# Patient Record
Sex: MALE | Race: WHITE | NOT HISPANIC OR LATINO | Employment: FULL TIME | URBAN - METROPOLITAN AREA
[De-identification: names, ages, dates, MRNs, and addresses within clinical notes are randomized per-mention and may not be internally consistent; named-entity substitution may affect disease eponyms.]

---

## 2017-02-02 ENCOUNTER — LAB VISIT (OUTPATIENT)
Dept: LAB | Facility: OTHER | Age: 72
End: 2017-02-02
Attending: INTERNAL MEDICINE
Payer: MEDICARE

## 2017-02-02 ENCOUNTER — OFFICE VISIT (OUTPATIENT)
Dept: INTERNAL MEDICINE | Facility: CLINIC | Age: 72
End: 2017-02-02
Attending: INTERNAL MEDICINE
Payer: MEDICARE

## 2017-02-02 VITALS
HEART RATE: 59 BPM | BODY MASS INDEX: 23.53 KG/M2 | OXYGEN SATURATION: 98 % | DIASTOLIC BLOOD PRESSURE: 70 MMHG | WEIGHT: 146.38 LBS | HEIGHT: 66 IN | SYSTOLIC BLOOD PRESSURE: 148 MMHG

## 2017-02-02 DIAGNOSIS — Z11.59 NEED FOR HEPATITIS C SCREENING TEST: ICD-10-CM

## 2017-02-02 DIAGNOSIS — Z12.5 PROSTATE CANCER SCREENING: ICD-10-CM

## 2017-02-02 DIAGNOSIS — N40.0 BENIGN NON-NODULAR PROSTATIC HYPERPLASIA WITHOUT LOWER URINARY TRACT SYMPTOMS: ICD-10-CM

## 2017-02-02 DIAGNOSIS — Z00.00 ANNUAL PHYSICAL EXAM: Primary | ICD-10-CM

## 2017-02-02 DIAGNOSIS — M10.9 ACUTE GOUT OF FOOT, UNSPECIFIED CAUSE, UNSPECIFIED LATERALITY: ICD-10-CM

## 2017-02-02 DIAGNOSIS — R79.9 ABNORMAL FINDING OF BLOOD CHEMISTRY: ICD-10-CM

## 2017-02-02 DIAGNOSIS — I10 BENIGN ESSENTIAL HTN: ICD-10-CM

## 2017-02-02 DIAGNOSIS — Z00.00 ANNUAL PHYSICAL EXAM: ICD-10-CM

## 2017-02-02 LAB
ALBUMIN SERPL BCP-MCNC: 3.8 G/DL
ALP SERPL-CCNC: 62 U/L
ALT SERPL W/O P-5'-P-CCNC: 20 U/L
ANION GAP SERPL CALC-SCNC: 11 MMOL/L
AST SERPL-CCNC: 35 U/L
BASOPHILS # BLD AUTO: 0.03 K/UL
BASOPHILS NFR BLD: 0.4 %
BILIRUB SERPL-MCNC: 0.6 MG/DL
BUN SERPL-MCNC: 19 MG/DL
CALCIUM SERPL-MCNC: 9.1 MG/DL
CHLORIDE SERPL-SCNC: 102 MMOL/L
CHOLEST/HDLC SERPL: 3.5 {RATIO}
CO2 SERPL-SCNC: 24 MMOL/L
COMPLEXED PSA SERPL-MCNC: 2.5 NG/ML
CREAT SERPL-MCNC: 1.2 MG/DL
DIFFERENTIAL METHOD: ABNORMAL
EOSINOPHIL # BLD AUTO: 0.3 K/UL
EOSINOPHIL NFR BLD: 3.9 %
ERYTHROCYTE [DISTWIDTH] IN BLOOD BY AUTOMATED COUNT: 12.5 %
EST. GFR  (AFRICAN AMERICAN): >60 ML/MIN/1.73 M^2
EST. GFR  (NON AFRICAN AMERICAN): >60 ML/MIN/1.73 M^2
GLUCOSE SERPL-MCNC: 104 MG/DL
HCT VFR BLD AUTO: 37.3 %
HDL/CHOLESTEROL RATIO: 28.6 %
HDLC SERPL-MCNC: 220 MG/DL
HDLC SERPL-MCNC: 63 MG/DL
HGB BLD-MCNC: 12.4 G/DL
LDLC SERPL CALC-MCNC: 119.4 MG/DL
LYMPHOCYTES # BLD AUTO: 3 K/UL
LYMPHOCYTES NFR BLD: 40.5 %
MCH RBC QN AUTO: 33.1 PG
MCHC RBC AUTO-ENTMCNC: 33.2 %
MCV RBC AUTO: 100 FL
MONOCYTES # BLD AUTO: 0.6 K/UL
MONOCYTES NFR BLD: 8 %
NEUTROPHILS # BLD AUTO: 3.5 K/UL
NEUTROPHILS NFR BLD: 47.1 %
NONHDLC SERPL-MCNC: 157 MG/DL
PLATELET # BLD AUTO: 264 K/UL
PMV BLD AUTO: 10.7 FL
POTASSIUM SERPL-SCNC: 4.2 MMOL/L
PROT SERPL-MCNC: 7.3 G/DL
RBC # BLD AUTO: 3.75 M/UL
SODIUM SERPL-SCNC: 137 MMOL/L
TRIGL SERPL-MCNC: 188 MG/DL
WBC # BLD AUTO: 7.4 K/UL

## 2017-02-02 PROCEDURE — 80053 COMPREHEN METABOLIC PANEL: CPT

## 2017-02-02 PROCEDURE — 1126F AMNT PAIN NOTED NONE PRSNT: CPT | Mod: S$GLB,,, | Performed by: INTERNAL MEDICINE

## 2017-02-02 PROCEDURE — 99214 OFFICE O/P EST MOD 30 MIN: CPT | Mod: S$GLB,,, | Performed by: INTERNAL MEDICINE

## 2017-02-02 PROCEDURE — 99999 PR PBB SHADOW E&M-EST. PATIENT-LVL III: CPT | Mod: PBBFAC,,, | Performed by: INTERNAL MEDICINE

## 2017-02-02 PROCEDURE — 1159F MED LIST DOCD IN RCRD: CPT | Mod: S$GLB,,, | Performed by: INTERNAL MEDICINE

## 2017-02-02 PROCEDURE — 1160F RVW MEDS BY RX/DR IN RCRD: CPT | Mod: S$GLB,,, | Performed by: INTERNAL MEDICINE

## 2017-02-02 PROCEDURE — 85025 COMPLETE CBC W/AUTO DIFF WBC: CPT

## 2017-02-02 PROCEDURE — 3078F DIAST BP <80 MM HG: CPT | Mod: S$GLB,,, | Performed by: INTERNAL MEDICINE

## 2017-02-02 PROCEDURE — 3077F SYST BP >= 140 MM HG: CPT | Mod: S$GLB,,, | Performed by: INTERNAL MEDICINE

## 2017-02-02 PROCEDURE — 84153 ASSAY OF PSA TOTAL: CPT

## 2017-02-02 PROCEDURE — 36415 COLL VENOUS BLD VENIPUNCTURE: CPT

## 2017-02-02 PROCEDURE — 83036 HEMOGLOBIN GLYCOSYLATED A1C: CPT

## 2017-02-02 PROCEDURE — 80061 LIPID PANEL: CPT

## 2017-02-02 PROCEDURE — 1157F ADVNC CARE PLAN IN RCRD: CPT | Mod: S$GLB,,, | Performed by: INTERNAL MEDICINE

## 2017-02-02 PROCEDURE — 86803 HEPATITIS C AB TEST: CPT

## 2017-02-02 RX ORDER — ALLOPURINOL 300 MG/1
300 TABLET ORAL DAILY
Qty: 90 TABLET | Refills: 1 | Status: SHIPPED | OUTPATIENT
Start: 2017-02-02 | End: 2017-08-24 | Stop reason: SDUPTHER

## 2017-02-02 RX ORDER — LOSARTAN POTASSIUM 100 MG/1
100 TABLET ORAL DAILY
Qty: 90 TABLET | Refills: 1 | Status: SHIPPED | OUTPATIENT
Start: 2017-02-02 | End: 2017-08-24 | Stop reason: SDUPTHER

## 2017-02-02 RX ORDER — AMLODIPINE BESYLATE 5 MG/1
5 TABLET ORAL DAILY
Qty: 90 TABLET | Refills: 1 | Status: SHIPPED | OUTPATIENT
Start: 2017-02-02 | End: 2017-08-24 | Stop reason: SDUPTHER

## 2017-02-02 NOTE — PATIENT INSTRUCTIONS

## 2017-02-02 NOTE — PROGRESS NOTES
"Subjective:       Patient ID: Rico Park Jr. is a 71 y.o. male.    Chief Complaint: Annual Exam    HPI Comments: Here for annual visit    Radiating low back pain much improved on gabapentin 300mg he takes this most days BID and sometimes TID. He will still have the occasional symptom with walking long distances and requires him to stop and rest for 60 seconds. He is able to get back to run which he is happy about. No significant symptoms with running.    No recent gout issues.    BP remains elevated. Denies CP, SOB, dizziness, blurry vision, or frequent HA.          Review of Systems   Constitutional: Negative for appetite change, chills, fever and unexpected weight change.   HENT: Negative for hearing loss, sore throat and trouble swallowing.    Eyes: Negative for visual disturbance.   Respiratory: Negative for cough, chest tightness and shortness of breath.    Cardiovascular: Negative for chest pain and leg swelling.   Gastrointestinal: Negative for abdominal pain, blood in stool, constipation, diarrhea, nausea and vomiting.   Endocrine: Negative for polydipsia and polyuria.   Genitourinary: Negative for decreased urine volume, difficulty urinating, dysuria, frequency and urgency.   Musculoskeletal: Positive for back pain. Negative for gait problem.   Skin: Negative for rash.   Neurological: Negative for dizziness and numbness.   Psychiatric/Behavioral: The patient is not nervous/anxious.        Objective:      Vitals:    02/02/17 0851   BP: (!) 148/70   Pulse: (!) 59   SpO2: 98%   Weight: 66.4 kg (146 lb 6.2 oz)   Height: 5' 6" (1.676 m)      Physical Exam   Constitutional: He is oriented to person, place, and time. He appears well-developed and well-nourished. No distress.   HENT:   Head: Normocephalic and atraumatic.   Mouth/Throat: Oropharynx is clear and moist. No oropharyngeal exudate.   Eyes: Conjunctivae and EOM are normal. Pupils are equal, round, and reactive to light. No scleral icterus.   Neck: " No thyromegaly present.   Cardiovascular: Normal rate, regular rhythm and normal heart sounds.    No murmur heard.  Pulmonary/Chest: Effort normal and breath sounds normal. He has no wheezes. He has no rales.   Abdominal: Soft. He exhibits no distension. There is no tenderness.   Musculoskeletal: He exhibits no edema or tenderness.   Lymphadenopathy:     He has no cervical adenopathy.   Neurological: He is alert and oriented to person, place, and time.   Skin: Skin is warm and dry.   Psychiatric: He has a normal mood and affect. His behavior is normal.       Assessment:       1. Annual physical exam    2. Need for hepatitis C screening test    3. Acute gout of foot, unspecified cause, unspecified laterality    4. Benign essential HTN    5. Abnormal finding of blood chemistry     6. Prostate cancer screening    7. Benign non-nodular prostatic hyperplasia without lower urinary tract symptoms        Plan:       Rico was seen today for annual exam.    Diagnoses and all orders for this visit:    Need for hepatitis C screening test  -     Hepatitis C antibody; Future    Acute gout of foot, unspecified cause, unspecified laterality  -     allopurinol (ZYLOPRIM) 300 MG tablet; Take 1 tablet (300 mg total) by mouth once daily.    Benign essential HTN  -     losartan (COZAAR) 100 MG tablet; Take 1 tablet (100 mg total) by mouth once daily.  -     amlodipine (NORVASC) 5 MG tablet; Take 1 tablet (5 mg total) by mouth once daily.  -f/u in 3-4 weeks for nurse visit for BP check      Annual physical exam  -     Hemoglobin A1c; Future  -     CBC auto differential; Future  -     Comprehensive metabolic panel; Future  -     Lipid panel; Future  -     PSA, Screening; Future    Abnormal finding of blood chemistry   -     Hemoglobin A1c; Future  -     CBC auto differential; Future  -     Comprehensive metabolic panel; Future  -     Lipid panel; Future  -     PSA, Screening; Future    Prostate cancer screening  -     PSA, Screening;  Future               Side effects of medication(s) were discussed in detail and patient voiced understanding.  Patient will call back for any issues or complications.

## 2017-02-02 NOTE — MR AVS SNAPSHOT
[facility]  [address]  [facility] [address]  Phone: [phone]  Fax: [phone]                  Rico Park Jr.   2017 9:00 AM   Office Visit    Description:  Male : 1945   Provider:  Osmar Stokes MD   Department:  [facility]           Reason for Visit     Annual Exam           Diagnoses this Visit        Comments    Need for hepatitis C screening test    -  Primary     Screening for colorectal cancer         Need for pneumococcal vaccination         Acute gout of foot, unspecified cause, unspecified laterality         Benign essential HTN         Annual physical exam         Abnormal finding of blood chemistry         Prostate cancer screening                To Do List           Future Appointments        Provider Department Dept Phone    2017 9:30 AM LAB, [facility] [phone]      Goals (5 Years of Data)     None       These Medications        Disp Refills Start End    allopurinol (ZYLOPRIM) 300 MG tablet 90 tablet 1 2017     Take 1 tablet (300 mg total) by mouth once daily. - Oral    Pharmacy: [id number] [address] Ph #: [phone]       losartan (COZAAR) 100 MG tablet 90 tablet 1 2017     Take 1 tablet (100 mg total) by mouth once daily. - Oral    Pharmacy: [id number] [address] Ph #: [phone]       amlodipine (NORVASC) 5 MG tablet 90 tablet 1 2017    Take 1 tablet (5 mg total) by mouth once daily. - Oral    Pharmacy: [id number] [address] Ph #: [phone]         [facility] On Call     [facility] On Call Nurse Care Line -  Assistance  Registered nurses in the [facility] On [facility] Center provide clinical advisement, health education, appointment booking, and other advisory services.  Call for this free service at [phone].             Medications     "       Message regarding Medications     Verify the changes and/or additions to your medication regime listed below are the same as discussed with your clinician today.  If any of these changes or additions are incorrect, please notify your healthcare provider.        START taking these NEW medications        Refills    amlodipine (NORVASC) 5 MG tablet 1    Sig: Take 1 tablet (5 mg total) by mouth once daily.    Class: Normal    Route: Oral           Verify that the below list of medications is an accurate representation of the medications you are currently taking.  If none reported, the list may be blank. If incorrect, please contact your healthcare provider. Carry this list with you in case of emergency.           Current Medications     gabapentin (NEURONTIN) 300 MG capsule Take 1 capsule (300 mg total) by mouth 3 (three) times daily.    losartan (COZAAR) 100 MG tablet Take 1 tablet (100 mg total) by mouth once daily.    allopurinol (ZYLOPRIM) 300 MG tablet Take 1 tablet (300 mg total) by mouth once daily.    amlodipine (NORVASC) 5 MG tablet Take 1 tablet (5 mg total) by mouth once daily.    ibuprofen (ADVIL,MOTRIN) 800 MG tablet Take 1 tablet (800 mg total) by mouth every 8 (eight) hours.    tadalafil (CIALIS) 5 MG tablet Take 1 tablet (5 mg total) by mouth daily as needed for Erectile Dysfunction. Daily Use           Clinical Reference Information           Vital Signs - Last Recorded  Most recent update: 2/2/2017  8:53 AM by Amanda Kasper MA    BP Pulse Ht Wt SpO2 BMI    (!) 148/70 (!) 59 5' 6" (1.676 m) 66.4 kg (146 lb 6.2 oz) 98% 23.63 kg/m2      Blood Pressure          Most Recent Value    BP  (!)  148/70      Allergies as of 2/2/2017     Pcn [Penicillins]      Immunizations Administered on Date of Encounter - 2/2/2017     None      Orders Placed During Today's Visit     Future Labs/Procedures Expected by Expires    CBC auto differential  2/2/2017 4/3/2018    Comprehensive metabolic panel  2/2/2017 " 4/3/2018    Hemoglobin A1c  2/2/2017 4/3/2018    Hepatitis C antibody  2/2/2017 3/31/2018    Lipid panel  2/2/2017 4/3/2018    PSA, Screening  2/2/2017 5/3/2017      Instructions      Low-Salt Diet  This diet removes foods that are high in salt. It also limits the amount of salt you use when cooking. It is most often used for people with high blood pressure, edema (fluid retention), and kidney, liver, or heart disease.  Table salt contains the mineral sodium. Your body needs sodium to work normally. But too much sodium can make your health problems worse. Your healthcare provider is recommending a low-salt (also called low-sodium) diet for you. Your total daily allowance of salt is 1,500 to 2,300 milligrams (mg). It is less than 1 teaspoon of table salt. This means you can have only about 500 to 700 mg of sodium at each meal. People with certain health problems should limit salt intake to the lower end of the recommended range.    When you cook, dont add much salt. If you can cook without using salt, even better. Dont add salt to your food at the table.  When shopping, read food labels. Salt is often called sodium on the label. Choose foods that are salt-free, low salt, or very low salt. Note that foods with reduced salt may not lower your salt intake enough.    Beans, potatoes, and pasta  Ok: Dry beans, split peas, lentils, potatoes, rice, macaroni, pasta, spaghetti without added salt  Avoid: Potato chips, tortilla chips, and similar products  Breads and cereals  Ok: Low-sodium breads, rolls, cereals, and cakes; low-salt crackers, matzo crackers  Avoid: Salted crackers, pretzels, popcorn, Czech toast, pancakes, muffins  Dairy  Ok: Milk, chocolate milk, hot chocolate mix, low-salt cheeses, and yogurt  Avoid: Processed cheese and cheese spreads; Roquefort, Camembert, and cottage cheese; buttermilk, instant breakfast drink  Desserts  Ok: Ice cream, frozen yogurt, juice bars, gelatin, cookies and pies, sugar, honey,  jelly, hard candy  Avoid: Most pies, cakes and cookies prepared or processed with salt; instant pudding  Drinks  Ok: Tea, coffee, fizzy (carbonated) drinks, juices  Avoid: Flavored coffees, electrolyte replacement drinks, sports drinks  Meats  Ok: All fresh meat, fish, poultry, low-salt tuna, eggs, egg substitute  Avoid: Smoked, pickled, brine-cured, or salted meats and fish. This includes aguilar, chipped beef, corned beef, hot dogs, deli meats, ham, kosher meats, salt pork, sausage, canned tuna, salted codfish, smoked salmon, herring, sardines, or anchovies.  Seasonings and spices  Ok: Most seasonings are okay. Good substitutes for salt include: fresh herb blends, hot sauce, lemon, garlic, mcgill, vinegar, dry mustard, parsley, cilantro, horseradish, tomato paste, regular margarine, mayonnaise, unsalted butter, cream cheese, vegetable oil, cream, low-salt salad dressing and gravy.  Avoid: Regular ketchup, relishes, pickles, soy sauce, teriyaki sauce, Worcestershire sauce, BBQ sauce, tartar sauce, meat tenderizer, chili sauce, regular gravy, regular salad dressing, salted butter  Soups  Ok: Low-salt soups and broths made with allowed foods  Avoid: Bouillon cubes, soups with smoked or salted meats, regular soup and broth  Vegetables  Ok: Most vegetables are okay; also low-salt tomato and vegetable juices  Avoid: Sauerkraut and other brine-soaked vegetables; pickles and other pickled vegetables; tomato juice, olives  © 9037-0971 [employer]. [address] [place] [address], [place] [address]. All rights reserved. This information is not intended as a substitute for professional medical care. Always follow your healthcare professional's instructions.

## 2017-02-03 LAB
ESTIMATED AVG GLUCOSE: 108 MG/DL
HBA1C MFR BLD HPLC: 5.4 %
HCV AB SERPL QL IA: NEGATIVE

## 2017-02-22 ENCOUNTER — PATIENT MESSAGE (OUTPATIENT)
Dept: INTERNAL MEDICINE | Facility: CLINIC | Age: 72
End: 2017-02-22

## 2017-02-22 DIAGNOSIS — R20.2 PARESTHESIA OF SKIN: ICD-10-CM

## 2017-02-22 DIAGNOSIS — D64.9 ANEMIA, UNSPECIFIED TYPE: Primary | ICD-10-CM

## 2017-02-22 RX ORDER — ATORVASTATIN CALCIUM 40 MG/1
40 TABLET, FILM COATED ORAL DAILY
Qty: 90 TABLET | Refills: 3 | Status: SHIPPED | OUTPATIENT
Start: 2017-02-22 | End: 2017-09-06 | Stop reason: SDUPTHER

## 2017-03-03 ENCOUNTER — CLINICAL SUPPORT (OUTPATIENT)
Dept: INTERNAL MEDICINE | Facility: CLINIC | Age: 72
End: 2017-03-03
Payer: MEDICARE

## 2017-03-03 ENCOUNTER — LAB VISIT (OUTPATIENT)
Dept: LAB | Facility: OTHER | Age: 72
End: 2017-03-03
Attending: INTERNAL MEDICINE
Payer: MEDICARE

## 2017-03-03 VITALS — HEART RATE: 71 BPM | DIASTOLIC BLOOD PRESSURE: 62 MMHG | SYSTOLIC BLOOD PRESSURE: 124 MMHG

## 2017-03-03 DIAGNOSIS — R20.2 PARESTHESIA OF SKIN: ICD-10-CM

## 2017-03-03 DIAGNOSIS — D64.9 ANEMIA, UNSPECIFIED TYPE: ICD-10-CM

## 2017-03-03 LAB
BASOPHILS # BLD AUTO: 0.02 K/UL
BASOPHILS NFR BLD: 0.3 %
DIFFERENTIAL METHOD: ABNORMAL
EOSINOPHIL # BLD AUTO: 0.5 K/UL
EOSINOPHIL NFR BLD: 8.9 %
ERYTHROCYTE [DISTWIDTH] IN BLOOD BY AUTOMATED COUNT: 12.3 %
FERRITIN SERPL-MCNC: 739 NG/ML
HCT VFR BLD AUTO: 38.5 %
HGB BLD-MCNC: 12.9 G/DL
IRON SERPL-MCNC: 55 UG/DL
LYMPHOCYTES # BLD AUTO: 2.5 K/UL
LYMPHOCYTES NFR BLD: 40.4 %
MCH RBC QN AUTO: 32.3 PG
MCHC RBC AUTO-ENTMCNC: 33.5 %
MCV RBC AUTO: 97 FL
MONOCYTES # BLD AUTO: 0.4 K/UL
MONOCYTES NFR BLD: 6.8 %
NEUTROPHILS # BLD AUTO: 2.6 K/UL
NEUTROPHILS NFR BLD: 43.4 %
PLATELET # BLD AUTO: 281 K/UL
PMV BLD AUTO: 10.1 FL
RBC # BLD AUTO: 3.99 M/UL
RETICS/RBC NFR AUTO: 1.4 %
SATURATED IRON: 15 %
TOTAL IRON BINDING CAPACITY: 355 UG/DL
TRANSFERRIN SERPL-MCNC: 240 MG/DL
VIT B12 SERPL-MCNC: 728 PG/ML
WBC # BLD AUTO: 6.07 K/UL

## 2017-03-03 PROCEDURE — 85045 AUTOMATED RETICULOCYTE COUNT: CPT

## 2017-03-03 PROCEDURE — 82747 ASSAY OF FOLIC ACID RBC: CPT

## 2017-03-03 PROCEDURE — 83540 ASSAY OF IRON: CPT

## 2017-03-03 PROCEDURE — 82728 ASSAY OF FERRITIN: CPT

## 2017-03-03 PROCEDURE — 36415 COLL VENOUS BLD VENIPUNCTURE: CPT

## 2017-03-03 PROCEDURE — 82607 VITAMIN B-12: CPT

## 2017-03-03 PROCEDURE — 85025 COMPLETE CBC W/AUTO DIFF WBC: CPT

## 2017-03-03 PROCEDURE — 99999 PR PBB SHADOW E&M-EST. PATIENT-LVL I: CPT | Mod: PBBFAC,,,

## 2017-03-03 NOTE — PROGRESS NOTES
Rico Park . 71 y.o. male is here today for Blood Pressure check.   History of HTN yes.    Review of patient's allergies indicates:   Allergen Reactions    Pcn [penicillins] Rash     Creatinine   Date Value Ref Range Status   02/02/2017 1.2 0.5 - 1.4 mg/dL Final     Sodium   Date Value Ref Range Status   02/02/2017 137 136 - 145 mmol/L Final     Potassium   Date Value Ref Range Status   02/02/2017 4.2 3.5 - 5.1 mmol/L Final   ]  Patient verifies taking blood pressure medications on a regular bases at the same time of the day.     Current Outpatient Prescriptions:     allopurinol (ZYLOPRIM) 300 MG tablet, Take 1 tablet (300 mg total) by mouth once daily., Disp: 90 tablet, Rfl: 1    amlodipine (NORVASC) 5 MG tablet, Take 1 tablet (5 mg total) by mouth once daily., Disp: 90 tablet, Rfl: 1    atorvastatin (LIPITOR) 40 MG tablet, Take 1 tablet (40 mg total) by mouth once daily., Disp: 90 tablet, Rfl: 3    gabapentin (NEURONTIN) 300 MG capsule, Take 1 capsule (300 mg total) by mouth 3 (three) times daily., Disp: 90 capsule, Rfl: 2    ibuprofen (ADVIL,MOTRIN) 800 MG tablet, Take 1 tablet (800 mg total) by mouth every 8 (eight) hours., Disp: 42 tablet, Rfl: 0    losartan (COZAAR) 100 MG tablet, Take 1 tablet (100 mg total) by mouth once daily., Disp: 90 tablet, Rfl: 1    tadalafil (CIALIS) 5 MG tablet, Take 1 tablet (5 mg total) by mouth daily as needed for Erectile Dysfunction. Daily Use, Disp: 30 tablet, Rfl: 12  Does patient have record of home blood pressure readings no. Readings have been averaging n/a.   Last dose of blood pressure medication was taken at scheduled dose.  Patient is asymptomatic.   Complains of no issues today.    BP: 124/62 , Pulse: 71.

## 2017-03-04 LAB — FOLATE RBC-MCNC: 504 NG/ML

## 2017-03-13 ENCOUNTER — PATIENT MESSAGE (OUTPATIENT)
Dept: INTERNAL MEDICINE | Facility: CLINIC | Age: 72
End: 2017-03-13

## 2017-03-13 DIAGNOSIS — D64.9 NORMOCHROMIC ANEMIA: Primary | ICD-10-CM

## 2017-03-14 ENCOUNTER — PATIENT MESSAGE (OUTPATIENT)
Dept: INTERNAL MEDICINE | Facility: CLINIC | Age: 72
End: 2017-03-14

## 2017-03-20 ENCOUNTER — PATIENT MESSAGE (OUTPATIENT)
Dept: INTERNAL MEDICINE | Facility: CLINIC | Age: 72
End: 2017-03-20

## 2017-03-20 DIAGNOSIS — N52.9 ED (ERECTILE DYSFUNCTION) OF ORGANIC ORIGIN: ICD-10-CM

## 2017-03-20 RX ORDER — TADALAFIL 5 MG/1
5 TABLET ORAL DAILY PRN
Qty: 15 TABLET | Refills: 11 | Status: SHIPPED | OUTPATIENT
Start: 2017-03-20 | End: 2017-04-19

## 2017-03-30 NOTE — TELEPHONE ENCOUNTER
Please contact pt to schedule labs for patient at [facility] [place], [facility] by [facility] for morning of April 4th

## 2017-04-03 ENCOUNTER — PATIENT MESSAGE (OUTPATIENT)
Dept: SPINE | Facility: CLINIC | Age: 72
End: 2017-04-03

## 2017-04-03 DIAGNOSIS — M51.37 DDD (DEGENERATIVE DISC DISEASE), LUMBOSACRAL: ICD-10-CM

## 2017-04-03 DIAGNOSIS — M54.14 THORACIC AND LUMBOSACRAL NEURITIS: ICD-10-CM

## 2017-04-03 DIAGNOSIS — M41.9 ACQUIRED SCOLIOSIS: ICD-10-CM

## 2017-04-03 DIAGNOSIS — M43.10 ACQUIRED SPONDYLOLISTHESIS: ICD-10-CM

## 2017-04-03 DIAGNOSIS — M54.17 THORACIC AND LUMBOSACRAL NEURITIS: ICD-10-CM

## 2017-04-03 DIAGNOSIS — M47.819 SPONDYLOSIS WITHOUT MYELOPATHY: ICD-10-CM

## 2017-04-03 RX ORDER — GABAPENTIN 300 MG/1
300 CAPSULE ORAL 3 TIMES DAILY
Qty: 90 CAPSULE | Refills: 2 | Status: SHIPPED | OUTPATIENT
Start: 2017-04-03 | End: 2017-08-25 | Stop reason: SDUPTHER

## 2017-04-04 ENCOUNTER — LAB VISIT (OUTPATIENT)
Dept: LAB | Facility: OTHER | Age: 72
End: 2017-04-04
Attending: INTERNAL MEDICINE
Payer: MEDICARE

## 2017-04-04 DIAGNOSIS — D64.9 NORMOCHROMIC ANEMIA: ICD-10-CM

## 2017-04-04 LAB
CRP SERPL-MCNC: 1.4 MG/L
ERYTHROCYTE [SEDIMENTATION RATE] IN BLOOD BY WESTERGREN METHOD: 23 MM/HR
FERRITIN SERPL-MCNC: 679 NG/ML
HAPTOGLOB SERPL-MCNC: 208 MG/DL
LDH SERPL L TO P-CCNC: 145 U/L

## 2017-04-04 PROCEDURE — 82747 ASSAY OF FOLIC ACID RBC: CPT

## 2017-04-04 PROCEDURE — 83615 LACTATE (LD) (LDH) ENZYME: CPT

## 2017-04-04 PROCEDURE — 85651 RBC SED RATE NONAUTOMATED: CPT

## 2017-04-04 PROCEDURE — 86334 IMMUNOFIX E-PHORESIS SERUM: CPT

## 2017-04-04 PROCEDURE — 84165 PROTEIN E-PHORESIS SERUM: CPT

## 2017-04-04 PROCEDURE — 82728 ASSAY OF FERRITIN: CPT

## 2017-04-04 PROCEDURE — 83010 ASSAY OF HAPTOGLOBIN QUANT: CPT

## 2017-04-04 PROCEDURE — 36415 COLL VENOUS BLD VENIPUNCTURE: CPT

## 2017-04-04 PROCEDURE — 86140 C-REACTIVE PROTEIN: CPT

## 2017-04-04 PROCEDURE — 86334 IMMUNOFIX E-PHORESIS SERUM: CPT | Mod: 26,,, | Performed by: PATHOLOGY

## 2017-04-05 LAB
ALBUMIN SERPL ELPH-MCNC: 3.89 G/DL
ALPHA1 GLOB SERPL ELPH-MCNC: 0.3 G/DL
ALPHA2 GLOB SERPL ELPH-MCNC: 0.75 G/DL
B-GLOBULIN SERPL ELPH-MCNC: 0.71 G/DL
FOLATE RBC-MCNC: 488 NG/ML
GAMMA GLOB SERPL ELPH-MCNC: 0.96 G/DL
PROT SERPL-MCNC: 6.6 G/DL

## 2017-04-06 LAB
INTERPRETATION SERPL IFE-IMP: NORMAL
PATHOLOGIST INTERPRETATION IFE: NORMAL

## 2017-05-09 ENCOUNTER — PATIENT MESSAGE (OUTPATIENT)
Dept: INTERNAL MEDICINE | Facility: CLINIC | Age: 72
End: 2017-05-09

## 2017-05-09 DIAGNOSIS — D53.9 MACROCYTIC ANEMIA: Primary | ICD-10-CM

## 2017-06-12 ENCOUNTER — LAB VISIT (OUTPATIENT)
Dept: LAB | Facility: OTHER | Age: 72
End: 2017-06-12
Attending: INTERNAL MEDICINE
Payer: MEDICARE

## 2017-06-12 ENCOUNTER — OFFICE VISIT (OUTPATIENT)
Dept: HEMATOLOGY/ONCOLOGY | Facility: CLINIC | Age: 72
End: 2017-06-12
Attending: INTERNAL MEDICINE
Payer: MEDICARE

## 2017-06-12 VITALS
RESPIRATION RATE: 16 BRPM | WEIGHT: 145.94 LBS | BODY MASS INDEX: 23.56 KG/M2 | HEART RATE: 66 BPM | SYSTOLIC BLOOD PRESSURE: 143 MMHG | TEMPERATURE: 99 F | DIASTOLIC BLOOD PRESSURE: 67 MMHG

## 2017-06-12 DIAGNOSIS — D53.9 MACROCYTIC ANEMIA: ICD-10-CM

## 2017-06-12 DIAGNOSIS — D63.8 ANEMIA OF OTHER CHRONIC DISEASE: ICD-10-CM

## 2017-06-12 DIAGNOSIS — D63.8 ANEMIA OF OTHER CHRONIC DISEASE: Primary | ICD-10-CM

## 2017-06-12 LAB
ERYTHROCYTE [DISTWIDTH] IN BLOOD BY AUTOMATED COUNT: 13.1 %
HCT VFR BLD AUTO: 33.4 %
HGB BLD-MCNC: 11.2 G/DL
MCH RBC QN AUTO: 33.1 PG
MCHC RBC AUTO-ENTMCNC: 33.5 %
MCV RBC AUTO: 99 FL
NEUTROPHILS # BLD AUTO: 6.2 K/UL
PLATELET # BLD AUTO: 265 K/UL
PMV BLD AUTO: 9.7 FL
RBC # BLD AUTO: 3.38 M/UL
WBC # BLD AUTO: 10.22 K/UL

## 2017-06-12 PROCEDURE — 36415 COLL VENOUS BLD VENIPUNCTURE: CPT

## 2017-06-12 PROCEDURE — 99999 PR PBB SHADOW E&M-EST. PATIENT-LVL III: CPT | Mod: PBBFAC,,, | Performed by: INTERNAL MEDICINE

## 2017-06-12 PROCEDURE — 82746 ASSAY OF FOLIC ACID SERUM: CPT

## 2017-06-12 PROCEDURE — 1126F AMNT PAIN NOTED NONE PRSNT: CPT | Mod: S$GLB,,, | Performed by: INTERNAL MEDICINE

## 2017-06-12 PROCEDURE — 99203 OFFICE O/P NEW LOW 30 MIN: CPT | Mod: S$GLB,,, | Performed by: INTERNAL MEDICINE

## 2017-06-12 PROCEDURE — 82668 ASSAY OF ERYTHROPOIETIN: CPT

## 2017-06-12 PROCEDURE — 1159F MED LIST DOCD IN RCRD: CPT | Mod: S$GLB,,, | Performed by: INTERNAL MEDICINE

## 2017-06-12 PROCEDURE — 85027 COMPLETE CBC AUTOMATED: CPT

## 2017-06-12 NOTE — PROGRESS NOTES
Subjective:       Patient ID: Rico Park Jr. is a 71 y.o. male.    Chief Complaint: Advice Only    HPI Mr Park is a 70 Y/O white male referred by Dr. Stokes for evaluation of anemia.    Review of blood work shows that CBC on February 2 of this year showed hemoglobin 12.4 with an MCV of 100.  White blood cell count was 7400 with normal differential white blood cell count and platelet count 264,000.  Other labs at that time revealed normal hepatic and renal function.    Follow-up blood work on March 3 showed a hemoglobin of 12.9 again with normal white blood cell count and platelet count.  MCV was 97.  Other laboratory that time showed a normal B12 level at 728, ferritin was 739.  Reticulocyte count 1.4.  Additional blood work on April 4 showed a ferritin level of 679, haptoglobin normal at 208 and sedimentation rate was 23 with a CRP of 1.4.  Serum protein electrophoresis showed no evidence of monoclonal peak.  Red blood cell folate was 488.    The patient denies any prior history of anemia.  His last blood counts were approximately 1-1/2 years ago.    There is no family history of anemia or blood disorder.    Review of systems is remarkable primarily for some problems with low back pain which have improved.    Social history: He has an , he does not smoke.  He drinks approximately 2 drinks per night either beer or wine.  Review of Systems   Constitutional: Negative for activity change, appetite change, chills, fever and unexpected weight change.   HENT: Negative for nosebleeds.    Respiratory: Negative for cough and shortness of breath.    Cardiovascular: Negative for chest pain.   Gastrointestinal: Negative for blood in stool, diarrhea and nausea.   Musculoskeletal: Positive for back pain.   Skin: Negative for rash.   Neurological: Negative for headaches.   Psychiatric/Behavioral: Negative for dysphoric mood. The patient is not nervous/anxious.        Objective:      Physical Exam    Constitutional: He is oriented to person, place, and time. He appears well-developed and well-nourished. No distress.   HENT:   Head: Normocephalic and atraumatic.   Eyes: Conjunctivae and EOM are normal. Pupils are equal, round, and reactive to light. No scleral icterus.   Cardiovascular: Normal rate, regular rhythm and normal heart sounds.    Abdominal: Soft. He exhibits no mass. There is no tenderness.   Musculoskeletal: He exhibits no edema.   Lymphadenopathy:     He has no cervical adenopathy.     He has no axillary adenopathy.        Right: No supraclavicular adenopathy present.        Left: No supraclavicular adenopathy present.   Neurological: He is alert and oriented to person, place, and time.   Skin: No rash noted.   Vitals reviewed.      Assessment:       1. Anemia of other chronic disease    2. Macrocytic anemia        Plan:       I reviewed the results of his previous blood work with patient.  Today we will recheck a CBC and review the peripheral smear.  We'll also check folate and erythropoietin.    Should his other testing remain negative for any potential cause and his hemoglobin remained the same range will likely will observe for the time being rather than proceed with a bone marrow.    CBC today shows a white count of 10,220, hemoglobin 11.2 with an MCV of 99 and platelet count of 265,000.    The results of outside blood work were obtained.  In February 2011 he will open was 12.5 with an MCV of 99.5 platelets were 700,000 and white blood cell count was 15.4.  Follow-up blood work from October 2014 showed a white blood cell count of 8800, he will open 12.9 with MCV of 96 and platelet count 356,000.      We'll repeat CBC in 1 month.

## 2017-06-12 NOTE — LETTER
June 12, 2017      Osmar Stokes MD  [address]  [address]  [facility] [address]           [facility]  [address], [address]  [facility] [address]  Phone: [phone]          Patient: Rico Park Jr.   MR Number: [id number]   YOB: 1945   Date of Visit: 6/12/2017       Dear Dr. Osmar Stokes:    Thank you for referring Rico Park to me for evaluation. Attached you will find relevant portions of my assessment and plan of care.    If you have questions, please do not hesitate to call me. I look forward to following Rico Park along with you.    Sincerely,    Dustin Sanabria MD    Enclosure  CC:  No Recipients    If you would like to receive this communication electronically, please contact [email or web address] or [phone] to request more information on [employer] access.    For providers and/or their staff who would like to refer a patient to [facility], please contact us through our one-stop-shop provider referral line, [facility], at [phone].    If you feel you have received this communication in error or would no longer like to receive these types of communications, please e-mail [email or web address]

## 2017-06-13 DIAGNOSIS — D63.8 ANEMIA OF OTHER CHRONIC DISEASE: Primary | ICD-10-CM

## 2017-06-13 LAB — FOLATE SERPL-MCNC: 4.3 NG/ML

## 2017-06-14 LAB — ERYTHROPOIETIN: 7.8 MIU/ML

## 2017-07-10 ENCOUNTER — LAB VISIT (OUTPATIENT)
Dept: LAB | Facility: OTHER | Age: 72
End: 2017-07-10
Attending: INTERNAL MEDICINE
Payer: MEDICARE

## 2017-07-10 DIAGNOSIS — D63.8 ANEMIA OF OTHER CHRONIC DISEASE: ICD-10-CM

## 2017-07-10 LAB
BASOPHILS # BLD AUTO: 0.03 K/UL
BASOPHILS NFR BLD: 0.5 %
DIFFERENTIAL METHOD: ABNORMAL
EOSINOPHIL # BLD AUTO: 0.5 K/UL
EOSINOPHIL NFR BLD: 8.1 %
ERYTHROCYTE [DISTWIDTH] IN BLOOD BY AUTOMATED COUNT: 12.7 %
HCT VFR BLD AUTO: 37.2 %
HGB BLD-MCNC: 12.4 G/DL
LYMPHOCYTES # BLD AUTO: 3 K/UL
LYMPHOCYTES NFR BLD: 48.5 %
MCH RBC QN AUTO: 33.2 PG
MCHC RBC AUTO-ENTMCNC: 33.3 %
MCV RBC AUTO: 100 FL
MONOCYTES # BLD AUTO: 0.4 K/UL
MONOCYTES NFR BLD: 6.8 %
NEUTROPHILS # BLD AUTO: 2.2 K/UL
NEUTROPHILS NFR BLD: 35.9 %
PLATELET # BLD AUTO: 271 K/UL
PMV BLD AUTO: 10.1 FL
RBC # BLD AUTO: 3.74 M/UL
WBC # BLD AUTO: 6.15 K/UL

## 2017-07-10 PROCEDURE — 85025 COMPLETE CBC W/AUTO DIFF WBC: CPT

## 2017-07-10 PROCEDURE — 36415 COLL VENOUS BLD VENIPUNCTURE: CPT

## 2017-08-01 ENCOUNTER — PATIENT MESSAGE (OUTPATIENT)
Dept: HEMATOLOGY/ONCOLOGY | Facility: CLINIC | Age: 72
End: 2017-08-01

## 2017-08-24 ENCOUNTER — OFFICE VISIT (OUTPATIENT)
Dept: INTERNAL MEDICINE | Facility: CLINIC | Age: 72
End: 2017-08-24
Attending: INTERNAL MEDICINE
Payer: MEDICARE

## 2017-08-24 VITALS
BODY MASS INDEX: 24.69 KG/M2 | WEIGHT: 144.63 LBS | DIASTOLIC BLOOD PRESSURE: 72 MMHG | HEIGHT: 64 IN | SYSTOLIC BLOOD PRESSURE: 132 MMHG | HEART RATE: 56 BPM

## 2017-08-24 DIAGNOSIS — N52.9 ERECTILE DYSFUNCTION, UNSPECIFIED ERECTILE DYSFUNCTION TYPE: ICD-10-CM

## 2017-08-24 DIAGNOSIS — D53.9 MACROCYTIC ANEMIA: ICD-10-CM

## 2017-08-24 DIAGNOSIS — I10 BENIGN ESSENTIAL HTN: Primary | ICD-10-CM

## 2017-08-24 DIAGNOSIS — M10.9 ACUTE GOUT OF FOOT, UNSPECIFIED CAUSE, UNSPECIFIED LATERALITY: ICD-10-CM

## 2017-08-24 PROCEDURE — 1159F MED LIST DOCD IN RCRD: CPT | Mod: S$GLB,,, | Performed by: INTERNAL MEDICINE

## 2017-08-24 PROCEDURE — 3008F BODY MASS INDEX DOCD: CPT | Mod: S$GLB,,, | Performed by: INTERNAL MEDICINE

## 2017-08-24 PROCEDURE — 3075F SYST BP GE 130 - 139MM HG: CPT | Mod: S$GLB,,, | Performed by: INTERNAL MEDICINE

## 2017-08-24 PROCEDURE — 1126F AMNT PAIN NOTED NONE PRSNT: CPT | Mod: S$GLB,,, | Performed by: INTERNAL MEDICINE

## 2017-08-24 PROCEDURE — 99214 OFFICE O/P EST MOD 30 MIN: CPT | Mod: S$GLB,,, | Performed by: INTERNAL MEDICINE

## 2017-08-24 PROCEDURE — 99999 PR PBB SHADOW E&M-EST. PATIENT-LVL III: CPT | Mod: PBBFAC,,, | Performed by: INTERNAL MEDICINE

## 2017-08-24 PROCEDURE — 3078F DIAST BP <80 MM HG: CPT | Mod: S$GLB,,, | Performed by: INTERNAL MEDICINE

## 2017-08-24 RX ORDER — AMLODIPINE BESYLATE 5 MG/1
5 TABLET ORAL DAILY
Qty: 90 TABLET | Refills: 3 | Status: SHIPPED | OUTPATIENT
Start: 2017-08-24 | End: 2017-09-12 | Stop reason: SDUPTHER

## 2017-08-24 RX ORDER — SILDENAFIL 100 MG/1
100 TABLET, FILM COATED ORAL DAILY PRN
Qty: 10 TABLET | Refills: 11 | Status: SHIPPED | OUTPATIENT
Start: 2017-08-24 | End: 2018-09-05

## 2017-08-24 RX ORDER — LOSARTAN POTASSIUM 100 MG/1
100 TABLET ORAL DAILY
Qty: 90 TABLET | Refills: 3 | Status: SHIPPED | OUTPATIENT
Start: 2017-08-24 | End: 2017-09-12 | Stop reason: SDUPTHER

## 2017-08-24 RX ORDER — ALLOPURINOL 300 MG/1
300 TABLET ORAL DAILY
Qty: 90 TABLET | Refills: 3 | Status: SHIPPED | OUTPATIENT
Start: 2017-08-24 | End: 2017-09-06 | Stop reason: SDUPTHER

## 2017-08-24 NOTE — PROGRESS NOTES
"Subjective:       Patient ID: Rico Park Jr. is a 71 y.o. male.    Chief Complaint: Hypertension    Here today for f/u    Needs refill of meds. BP borderline but repeat WNL. He continues to run several times a week about 1.5 miles. He had to take a 6 month break from running due to right low back/hip pain pain and is working on building his stamina back up. Pain controlled with gabapentin. Continues to follow with heme for unexplained anemia. No new symptoms of night sweats, weight loss, easy bruising/bleeding, bone pain.         Review of Systems    Objective:      Vitals:    08/24/17 0857 08/24/17 0921   BP: (!) 140/70 132/72   BP Location: Left arm    Patient Position: Sitting    BP Method: Medium (Manual)    Pulse: (!) 56    Weight: 65.6 kg (144 lb 10 oz)    Height: 5' 4" (1.626 m)       Physical Exam   Constitutional: He is oriented to person, place, and time. He appears well-developed and well-nourished. He does not have a sickly appearance. No distress.   HENT:   Head: Normocephalic and atraumatic.   Eyes: Conjunctivae and EOM are normal. Right eye exhibits no discharge. Left eye exhibits no discharge. No scleral icterus.   Pulmonary/Chest: Effort normal. No respiratory distress.   Abdominal: Normal appearance. He exhibits no distension.   Neurological: He is alert and oriented to person, place, and time.   Skin: Skin is warm and dry. He is not diaphoretic.   Psychiatric: He has a normal mood and affect. His speech is normal.       Assessment:       1. Benign essential HTN    2. Acute gout of foot, unspecified cause, unspecified laterality    3. Erectile dysfunction, unspecified erectile dysfunction type    4. Macrocytic anemia        Plan:       Rico was seen today for hypertension.    Diagnoses and all orders for this visit:    Benign essential HTN  -     losartan (COZAAR) 100 MG tablet; Take 1 tablet (100 mg total) by mouth once daily.  -     amlodipine (NORVASC) 5 MG tablet; Take 1 tablet (5 " mg total) by mouth once daily.    Acute gout of foot, unspecified cause, unspecified laterality  -     allopurinol (ZYLOPRIM) 300 MG tablet; Take 1 tablet (300 mg total) by mouth once daily.    Erectile dysfunction, unspecified erectile dysfunction type  -     sildenafil (VIAGRA) 100 MG tablet; Take 1 tablet (100 mg total) by mouth daily as needed for Erectile Dysfunction.    Macrocytic anemia  -f/u with heme             Side effects of medication(s) were discussed in detail and patient voiced understanding.  Patient will call back for any issues or complications.

## 2017-08-25 ENCOUNTER — PATIENT MESSAGE (OUTPATIENT)
Dept: SPINE | Facility: CLINIC | Age: 72
End: 2017-08-25

## 2017-08-25 DIAGNOSIS — M41.9 ACQUIRED SCOLIOSIS: ICD-10-CM

## 2017-08-25 DIAGNOSIS — M54.14 THORACIC AND LUMBOSACRAL NEURITIS: ICD-10-CM

## 2017-08-25 DIAGNOSIS — M47.819 SPONDYLOSIS WITHOUT MYELOPATHY: ICD-10-CM

## 2017-08-25 DIAGNOSIS — M54.17 THORACIC AND LUMBOSACRAL NEURITIS: ICD-10-CM

## 2017-08-25 DIAGNOSIS — M43.10 ACQUIRED SPONDYLOLISTHESIS: ICD-10-CM

## 2017-08-25 DIAGNOSIS — M51.37 DDD (DEGENERATIVE DISC DISEASE), LUMBOSACRAL: ICD-10-CM

## 2017-08-25 RX ORDER — GABAPENTIN 300 MG/1
300 CAPSULE ORAL 3 TIMES DAILY
Qty: 90 CAPSULE | Refills: 2 | Status: SHIPPED | OUTPATIENT
Start: 2017-08-25 | End: 2018-02-20 | Stop reason: SDUPTHER

## 2017-09-06 ENCOUNTER — PATIENT MESSAGE (OUTPATIENT)
Dept: INTERNAL MEDICINE | Facility: CLINIC | Age: 72
End: 2017-09-06

## 2017-09-06 DIAGNOSIS — M10.9 ACUTE GOUT OF FOOT, UNSPECIFIED CAUSE, UNSPECIFIED LATERALITY: ICD-10-CM

## 2017-09-06 RX ORDER — ALLOPURINOL 300 MG/1
300 TABLET ORAL DAILY
Qty: 90 TABLET | Refills: 3 | Status: SHIPPED | OUTPATIENT
Start: 2017-09-06 | End: 2018-09-08 | Stop reason: SDUPTHER

## 2017-09-06 RX ORDER — ATORVASTATIN CALCIUM 40 MG/1
40 TABLET, FILM COATED ORAL DAILY
Qty: 90 TABLET | Refills: 3 | Status: SHIPPED | OUTPATIENT
Start: 2017-09-06 | End: 2018-02-05 | Stop reason: SDUPTHER

## 2017-09-11 ENCOUNTER — PATIENT MESSAGE (OUTPATIENT)
Dept: INTERNAL MEDICINE | Facility: CLINIC | Age: 72
End: 2017-09-11

## 2017-09-11 DIAGNOSIS — I10 BENIGN ESSENTIAL HTN: ICD-10-CM

## 2017-09-12 RX ORDER — LOSARTAN POTASSIUM 100 MG/1
100 TABLET ORAL DAILY
Qty: 90 TABLET | Refills: 3 | Status: SHIPPED | OUTPATIENT
Start: 2017-09-12 | End: 2018-12-31 | Stop reason: SDUPTHER

## 2017-09-12 RX ORDER — AMLODIPINE BESYLATE 5 MG/1
5 TABLET ORAL DAILY
Qty: 90 TABLET | Refills: 3 | Status: SHIPPED | OUTPATIENT
Start: 2017-09-12 | End: 2018-12-31 | Stop reason: SDUPTHER

## 2018-01-15 ENCOUNTER — PATIENT MESSAGE (OUTPATIENT)
Dept: INTERNAL MEDICINE | Facility: CLINIC | Age: 73
End: 2018-01-15

## 2018-01-15 DIAGNOSIS — Z00.00 ANNUAL PHYSICAL EXAM: ICD-10-CM

## 2018-01-15 DIAGNOSIS — E78.5 HYPERLIPIDEMIA, UNSPECIFIED HYPERLIPIDEMIA TYPE: ICD-10-CM

## 2018-01-15 DIAGNOSIS — D53.9 MACROCYTIC ANEMIA: ICD-10-CM

## 2018-01-15 DIAGNOSIS — R20.2 TINGLING OF SKIN: ICD-10-CM

## 2018-01-15 DIAGNOSIS — M79.2 NEURALGIA OF RIGHT THIGH: ICD-10-CM

## 2018-01-15 DIAGNOSIS — Z12.5 PROSTATE CANCER SCREENING: ICD-10-CM

## 2018-01-15 DIAGNOSIS — I10 BENIGN ESSENTIAL HYPERTENSION: Primary | ICD-10-CM

## 2018-01-16 NOTE — TELEPHONE ENCOUNTER
"My [employer] message to pt.  "Let's do this. I will check will Dr. Sanabria to see what he needs and I will order my annual labs with cholesterol and thyroid, etc. This way you can get all you the labs you need and then we can schedule our annual appt and a f/u with Dr. Sanabria to discuss results and plan moving forward, ie. Proceed with bone marrow biopsy vs continued monitoring (to be dictated by your decision, results from labs, and your recent symptoms.) When I get the full list my staff will reach out."Km once we get all labs please assign together and contact  [facility] for labs and annual appt.  "

## 2018-01-22 ENCOUNTER — LAB VISIT (OUTPATIENT)
Dept: LAB | Facility: OTHER | Age: 73
End: 2018-01-22
Attending: INTERNAL MEDICINE
Payer: MEDICARE

## 2018-01-22 DIAGNOSIS — E78.5 HYPERLIPIDEMIA, UNSPECIFIED HYPERLIPIDEMIA TYPE: ICD-10-CM

## 2018-01-22 DIAGNOSIS — Z00.00 ANNUAL PHYSICAL EXAM: ICD-10-CM

## 2018-01-22 DIAGNOSIS — R20.2 TINGLING OF SKIN: ICD-10-CM

## 2018-01-22 DIAGNOSIS — Z12.5 PROSTATE CANCER SCREENING: ICD-10-CM

## 2018-01-22 DIAGNOSIS — D53.9 MACROCYTIC ANEMIA: ICD-10-CM

## 2018-01-22 DIAGNOSIS — I10 BENIGN ESSENTIAL HYPERTENSION: ICD-10-CM

## 2018-01-22 LAB
ALBUMIN SERPL BCP-MCNC: 3.9 G/DL
ALP SERPL-CCNC: 71 U/L
ALT SERPL W/O P-5'-P-CCNC: 24 U/L
ANION GAP SERPL CALC-SCNC: 10 MMOL/L
AST SERPL-CCNC: 29 U/L
BASOPHILS # BLD AUTO: 0.03 K/UL
BASOPHILS NFR BLD: 0.3 %
BILIRUB SERPL-MCNC: 0.6 MG/DL
BUN SERPL-MCNC: 18 MG/DL
CALCIUM SERPL-MCNC: 9.4 MG/DL
CHLORIDE SERPL-SCNC: 102 MMOL/L
CHOLEST SERPL-MCNC: 131 MG/DL
CHOLEST/HDLC SERPL: 1.9 {RATIO}
CO2 SERPL-SCNC: 25 MMOL/L
COMPLEXED PSA SERPL-MCNC: 2.1 NG/ML
CREAT SERPL-MCNC: 1.5 MG/DL
DIFFERENTIAL METHOD: ABNORMAL
EOSINOPHIL # BLD AUTO: 0.2 K/UL
EOSINOPHIL NFR BLD: 2 %
ERYTHROCYTE [DISTWIDTH] IN BLOOD BY AUTOMATED COUNT: 12.4 %
EST. GFR  (AFRICAN AMERICAN): 53 ML/MIN/1.73 M^2
EST. GFR  (NON AFRICAN AMERICAN): 46 ML/MIN/1.73 M^2
FERRITIN SERPL-MCNC: 618 NG/ML
GLUCOSE SERPL-MCNC: 91 MG/DL
HCT VFR BLD AUTO: 33.1 %
HDLC SERPL-MCNC: 69 MG/DL
HDLC SERPL: 52.7 %
HGB BLD-MCNC: 10.9 G/DL
IRON SERPL-MCNC: 130 UG/DL
LDLC SERPL CALC-MCNC: 23 MG/DL
LYMPHOCYTES # BLD AUTO: 3.1 K/UL
LYMPHOCYTES NFR BLD: 35.1 %
MCH RBC QN AUTO: 32.7 PG
MCHC RBC AUTO-ENTMCNC: 32.9 G/DL
MCV RBC AUTO: 99 FL
MONOCYTES # BLD AUTO: 0.7 K/UL
MONOCYTES NFR BLD: 7.6 %
NEUTROPHILS # BLD AUTO: 4.8 K/UL
NEUTROPHILS NFR BLD: 54.8 %
NONHDLC SERPL-MCNC: 62 MG/DL
PLATELET # BLD AUTO: 255 K/UL
PMV BLD AUTO: 10.5 FL
POTASSIUM SERPL-SCNC: 3.9 MMOL/L
PROT SERPL-MCNC: 7.4 G/DL
RBC # BLD AUTO: 3.33 M/UL
RETICS/RBC NFR AUTO: 1.3 %
SATURATED IRON: 36 %
SODIUM SERPL-SCNC: 137 MMOL/L
TOTAL IRON BINDING CAPACITY: 360 UG/DL
TRANSFERRIN SERPL-MCNC: 243 MG/DL
TRIGL SERPL-MCNC: 195 MG/DL
TSH SERPL DL<=0.005 MIU/L-ACNC: 2.84 UIU/ML
VIT B12 SERPL-MCNC: 283 PG/ML
WBC # BLD AUTO: 8.68 K/UL

## 2018-01-22 PROCEDURE — 36415 COLL VENOUS BLD VENIPUNCTURE: CPT

## 2018-01-22 PROCEDURE — 85025 COMPLETE CBC W/AUTO DIFF WBC: CPT

## 2018-01-22 PROCEDURE — 82607 VITAMIN B-12: CPT

## 2018-01-22 PROCEDURE — 85045 AUTOMATED RETICULOCYTE COUNT: CPT

## 2018-01-22 PROCEDURE — 82728 ASSAY OF FERRITIN: CPT

## 2018-01-22 PROCEDURE — 83540 ASSAY OF IRON: CPT

## 2018-01-22 PROCEDURE — 84153 ASSAY OF PSA TOTAL: CPT

## 2018-01-22 PROCEDURE — 84443 ASSAY THYROID STIM HORMONE: CPT

## 2018-01-22 PROCEDURE — 80053 COMPREHEN METABOLIC PANEL: CPT

## 2018-01-22 PROCEDURE — 80061 LIPID PANEL: CPT

## 2018-02-05 DIAGNOSIS — N17.9 AKI (ACUTE KIDNEY INJURY): Primary | ICD-10-CM

## 2018-02-05 DIAGNOSIS — E53.8 LOW SERUM VITAMIN B12: ICD-10-CM

## 2018-02-05 DIAGNOSIS — R74.01 TRANSAMINITIS: ICD-10-CM

## 2018-02-05 RX ORDER — ATORVASTATIN CALCIUM 20 MG/1
20 TABLET, FILM COATED ORAL DAILY
Qty: 90 TABLET | Refills: 1 | Status: SHIPPED | OUTPATIENT
Start: 2018-02-05 | End: 2019-02-05

## 2018-02-20 DIAGNOSIS — M51.37 DDD (DEGENERATIVE DISC DISEASE), LUMBOSACRAL: ICD-10-CM

## 2018-02-20 DIAGNOSIS — M47.819 SPONDYLOSIS WITHOUT MYELOPATHY: ICD-10-CM

## 2018-02-20 DIAGNOSIS — M41.9 ACQUIRED SCOLIOSIS: ICD-10-CM

## 2018-02-20 DIAGNOSIS — M43.10 ACQUIRED SPONDYLOLISTHESIS: ICD-10-CM

## 2018-02-20 DIAGNOSIS — M54.17 THORACIC AND LUMBOSACRAL NEURITIS: ICD-10-CM

## 2018-02-20 DIAGNOSIS — M54.14 THORACIC AND LUMBOSACRAL NEURITIS: ICD-10-CM

## 2018-02-20 RX ORDER — GABAPENTIN 300 MG/1
300 CAPSULE ORAL 3 TIMES DAILY
Qty: 90 CAPSULE | Refills: 1 | Status: SHIPPED | OUTPATIENT
Start: 2018-02-20 | End: 2018-03-01

## 2018-03-01 ENCOUNTER — OFFICE VISIT (OUTPATIENT)
Dept: UROLOGY | Facility: CLINIC | Age: 73
End: 2018-03-01
Payer: MEDICARE

## 2018-03-01 VITALS — BODY MASS INDEX: 24.17 KG/M2 | WEIGHT: 141.56 LBS | HEIGHT: 64 IN | RESPIRATION RATE: 18 BRPM

## 2018-03-01 DIAGNOSIS — N52.9 ED (ERECTILE DYSFUNCTION) OF ORGANIC ORIGIN: ICD-10-CM

## 2018-03-01 DIAGNOSIS — N40.0 BENIGN PROSTATIC HYPERPLASIA WITHOUT LOWER URINARY TRACT SYMPTOMS: Primary | ICD-10-CM

## 2018-03-01 PROCEDURE — 99214 OFFICE O/P EST MOD 30 MIN: CPT | Mod: S$GLB,,, | Performed by: UROLOGY

## 2018-03-01 PROCEDURE — 99999 PR PBB SHADOW E&M-EST. PATIENT-LVL III: CPT | Mod: PBBFAC,,, | Performed by: UROLOGY

## 2018-03-01 RX ORDER — TADALAFIL 20 MG/1
20 TABLET ORAL
Qty: 5 TABLET | Refills: 12 | Status: SHIPPED | OUTPATIENT
Start: 2018-03-01 | End: 2018-09-05

## 2018-03-01 NOTE — PROGRESS NOTES
CC: ED    Rico Park Jr. is a 72 y.o. man who is here for the evaluation of Prostate Check (last visit 2.19.2016, no complaints )  last visit with me on 2/19/16.  used followed by a urologist in [place], but he now lives in [place].  Thus he wants to establish his urologic care with us.  C/o viagra not working well.  Used 100 mg dose from [facility] pharmacy.  He is interested in alternative therapy.    Voices no voiding problem.  No family hx of prostate cancer.  Hx of mild ED and used to use cialis.  Denies flank pain, dysuira, hematuria.   He is a personal injury  .     He  to a woman 8 years younger and this is his second marriage after he was .    Past Medical History:   Diagnosis Date    Cataract      Past Surgical History:   Procedure Laterality Date    CATARACT EXTRACTION      TONSILLECTOMY, ADENOIDECTOMY       Social History   Substance Use Topics    Smoking status: Never Smoker    Smokeless tobacco: Never Used    Alcohol use Yes      Comment: wine every other night     Family History   Problem Relation Age of Onset    Heart disease Father     Diabetes Father     Diabetes Mother      Allergy:  Review of patient's allergies indicates:   Allergen Reactions    Pcn [penicillins] Rash     Outpatient Encounter Prescriptions as of 3/1/2018   Medication Sig Dispense Refill    allopurinol (ZYLOPRIM) 300 MG tablet Take 1 tablet (300 mg total) by mouth once daily. 90 tablet 3    amlodipine (NORVASC) 5 MG tablet Take 1 tablet (5 mg total) by mouth once daily. 90 tablet 3    atorvastatin (LIPITOR) 20 MG tablet Take 1 tablet (20 mg total) by mouth once daily. 90 tablet 1    ibuprofen (ADVIL,MOTRIN) 800 MG tablet Take 1 tablet (800 mg total) by mouth every 8 (eight) hours. 42 tablet 0    losartan (COZAAR) 100 MG tablet Take 1 tablet (100 mg total) by mouth once daily. 90 tablet 3    sildenafil (VIAGRA) 100 MG tablet Take 1 tablet (100 mg total) by mouth daily as needed for  Erectile Dysfunction. 10 tablet 11    tadalafil (CIALIS) 20 MG Tab Take 1 tablet (20 mg total) by mouth as needed. 5 tablet 12    tadalafil (CIALIS) 5 MG tablet Take 1 tablet (5 mg total) by mouth daily as needed for Erectile Dysfunction. Daily Use 15 tablet 11    [DISCONTINUED] gabapentin (NEURONTIN) 300 MG capsule Take 1 capsule (300 mg total) by mouth 3 (three) times daily. 90 capsule 1     No facility-administered encounter medications on file as of 3/1/2018.      Review of Systems   General ROS: GENERAL:  No weight gain or loss  SKIN:  No rashes or lacerations  HEAD:  No headaches  EYES:  No exophthalmos, jaundice or blindness  EARS:  No dizziness, tinnitus or hearing loss  NOSE:  No changes in smell  MOUTH & THROAT:  No dyskinetic movements or obvious goiter  CHEST:  No shortness of breath, hyperventilation or cough  CARDIOVASCULAR:  No tachycardia or chest pain  ABDOMEN:  No nausea, vomiting, pain, constipation or diarrhea  URINARY:  No frequency, dysuria or sexual dysfunction  ENDOCRINE:  No polydipsia, polyuria  MUSCULOSKELETAL:  No pain or stiffness of the joints  NEUROLOGIC:  No weakness, sensory changes, seizures, confusion, memory loss, tremor or other abnormal movements  Physical Exam     Vitals:    03/01/18 1513   Resp: 18     Physical Exam   Constitutional: He is oriented to person, place, and time. He appears well-developed and well-nourished. No distress.   HENT:   Head: Normocephalic and atraumatic.   Right Ear: External ear normal.   Left Ear: External ear normal.   Nose: Nose normal.   Mouth/Throat: Oropharynx is clear and moist.   Eyes: Conjunctivae are normal. Pupils are equal, round, and reactive to light.   Neck: Normal range of motion. Neck supple. No JVD present. No tracheal deviation present. No thyromegaly present.   Cardiovascular: Normal rate, regular rhythm, normal heart sounds and intact distal pulses.  Exam reveals no gallop and no friction rub.    No murmur  heard.  Pulmonary/Chest: Effort normal and breath sounds normal. No respiratory distress. He has no wheezes. He exhibits no tenderness.   Abdominal: Soft. Bowel sounds are normal. He exhibits no distension and no mass. There is no tenderness. There is no rebound and no guarding.   Genitourinary: Rectum normal and penis normal. No penile tenderness.   Genitourinary Comments: Prostate 35 grams with negative nodule or negative tenderness     Musculoskeletal: Normal range of motion. He exhibits no edema, tenderness or deformity.   Lymphadenopathy:     He has no cervical adenopathy.   Neurological: He is alert and oriented to person, place, and time.   Skin: Skin is warm and dry. He is not diaphoretic.     Psychiatric: He has a normal mood and affect. His behavior is normal. Thought content normal.     Genitalia:  Scrotum: no rash or lesion  Normal symmetric epididymis without masses  Normal vas palpated  Normal size, symmetric testicles with no masses   Normal urethral meatus with no discharge  Normal circumcised penis with no lesion   Rectal:  Normal perineum and anus upon inspection.  Normal tone, no masses or tenderness;     LABS:  Lab Results   Component Value Date    PSA 2.1 01/22/2018    PSA 2.5 02/02/2017    PSADIAG 3.5 02/12/2016    PSADIAG 3.0 01/13/2015     Results for orders placed or performed in visit on 02/12/16   PROSTATE SPECIFIC ANTIGEN, DIAGNOSTIC   Result Value Ref Range    PSA DIAGNOSTIC 3.5 0.00 - 4.00 ng/mL   Results for orders placed or performed in visit on 01/13/15   Prostate Specific Antigen, Diagnostic   Result Value Ref Range    PSA DIAGNOSTIC 3.0 0.00 - 4.00 ng/mL     Lab Results   Component Value Date    CREATININE 1.5 (H) 01/22/2018    CREATININE 1.2 02/02/2017     Assessment and Plan:  Rico was seen today for prostate check.    Diagnoses and all orders for this visit:    Benign prostatic hyperplasia without lower urinary tract symptoms    ED (erectile dysfunction) of organic origin  -      tadalafil (CIALIS) 20 MG Tab; Take 1 tablet (20 mg total) by mouth as needed.    I explained how erection occurs, common causes of ED, treatment options including oral mediations, vacuum erection devices(CANDACE), injection therapy, MUSE, and penile prostheses.     He is interested in trying another pill.  Alternatively he may consider PEP injection.  A brochure given.  Rx given and he understands that he should get the medication from a compounding pharmacy ( [facility]).    Follow-up:  Follow-up in about 6 months (around 9/1/2018).

## 2018-03-02 ENCOUNTER — PATIENT MESSAGE (OUTPATIENT)
Dept: UROLOGY | Facility: CLINIC | Age: 73
End: 2018-03-02

## 2018-03-02 RX ORDER — GABAPENTIN 300 MG/1
300 CAPSULE ORAL 3 TIMES DAILY
COMMUNITY
End: 2018-07-10 | Stop reason: SDUPTHER

## 2018-07-02 ENCOUNTER — PATIENT MESSAGE (OUTPATIENT)
Dept: SPINE | Facility: CLINIC | Age: 73
End: 2018-07-02

## 2018-07-03 RX ORDER — GABAPENTIN 300 MG/1
300 CAPSULE ORAL 3 TIMES DAILY
OUTPATIENT
Start: 2018-07-03

## 2018-07-03 NOTE — TELEPHONE ENCOUNTER
Called patient back to inquire about him wanting to be seen and to get more dept on what he really needs to be seen fore [facility] no answer, left voicemail.  X  1st Request  _  2nd Request  _  3rd Request    Lowell Clark MA

## 2018-07-10 ENCOUNTER — PATIENT MESSAGE (OUTPATIENT)
Dept: INTERNAL MEDICINE | Facility: CLINIC | Age: 73
End: 2018-07-10

## 2018-07-10 RX ORDER — GABAPENTIN 300 MG/1
300 CAPSULE ORAL 3 TIMES DAILY
Qty: 90 CAPSULE | Refills: 3 | Status: SHIPPED | OUTPATIENT
Start: 2018-07-10 | End: 2018-09-05 | Stop reason: SDUPTHER

## 2018-09-05 ENCOUNTER — LAB VISIT (OUTPATIENT)
Dept: LAB | Facility: OTHER | Age: 73
End: 2018-09-05
Attending: INTERNAL MEDICINE
Payer: MEDICARE

## 2018-09-05 ENCOUNTER — OFFICE VISIT (OUTPATIENT)
Dept: INTERNAL MEDICINE | Facility: CLINIC | Age: 73
End: 2018-09-05
Attending: INTERNAL MEDICINE
Payer: MEDICARE

## 2018-09-05 VITALS
WEIGHT: 140.63 LBS | HEIGHT: 66 IN | DIASTOLIC BLOOD PRESSURE: 64 MMHG | BODY MASS INDEX: 22.6 KG/M2 | SYSTOLIC BLOOD PRESSURE: 138 MMHG | HEART RATE: 63 BPM | OXYGEN SATURATION: 96 %

## 2018-09-05 DIAGNOSIS — R20.9 DISTURBANCE OF SKIN SENSATION: ICD-10-CM

## 2018-09-05 DIAGNOSIS — R74.01 TRANSAMINITIS: ICD-10-CM

## 2018-09-05 DIAGNOSIS — M79.2 NEURALGIA OF RIGHT THIGH: ICD-10-CM

## 2018-09-05 DIAGNOSIS — R79.9 ABNORMAL FINDING OF BLOOD CHEMISTRY: ICD-10-CM

## 2018-09-05 DIAGNOSIS — D53.9 MACROCYTIC ANEMIA: ICD-10-CM

## 2018-09-05 DIAGNOSIS — Z00.00 ANNUAL PHYSICAL EXAM: ICD-10-CM

## 2018-09-05 DIAGNOSIS — Z00.00 ANNUAL PHYSICAL EXAM: Primary | ICD-10-CM

## 2018-09-05 DIAGNOSIS — N17.9 AKI (ACUTE KIDNEY INJURY): ICD-10-CM

## 2018-09-05 LAB
ALBUMIN SERPL BCP-MCNC: 3.8 G/DL
ALP SERPL-CCNC: 68 U/L
ALT SERPL W/O P-5'-P-CCNC: 17 U/L
ANION GAP SERPL CALC-SCNC: 14 MMOL/L
AST SERPL-CCNC: 28 U/L
BASOPHILS # BLD AUTO: 0.04 K/UL
BASOPHILS NFR BLD: 0.6 %
BILIRUB SERPL-MCNC: 0.6 MG/DL
BUN SERPL-MCNC: 16 MG/DL
CALCIUM SERPL-MCNC: 9.5 MG/DL
CHLORIDE SERPL-SCNC: 105 MMOL/L
CHOLEST SERPL-MCNC: 170 MG/DL
CHOLEST/HDLC SERPL: 2.6 {RATIO}
CO2 SERPL-SCNC: 20 MMOL/L
CREAT SERPL-MCNC: 0.9 MG/DL
DIFFERENTIAL METHOD: ABNORMAL
EOSINOPHIL # BLD AUTO: 0.2 K/UL
EOSINOPHIL NFR BLD: 3.5 %
ERYTHROCYTE [DISTWIDTH] IN BLOOD BY AUTOMATED COUNT: 12.5 %
ERYTHROCYTE [DISTWIDTH] IN BLOOD BY AUTOMATED COUNT: 12.5 %
EST. GFR  (AFRICAN AMERICAN): >60 ML/MIN/1.73 M^2
EST. GFR  (NON AFRICAN AMERICAN): >60 ML/MIN/1.73 M^2
ESTIMATED AVG GLUCOSE: 105 MG/DL
GLUCOSE SERPL-MCNC: 91 MG/DL
HBA1C MFR BLD HPLC: 5.3 %
HCT VFR BLD AUTO: 34 %
HCT VFR BLD AUTO: 34 %
HDLC SERPL-MCNC: 65 MG/DL
HDLC SERPL: 38.2 %
HGB BLD-MCNC: 11.1 G/DL
HGB BLD-MCNC: 11.1 G/DL
LDLC SERPL CALC-MCNC: ABNORMAL MG/DL
LYMPHOCYTES # BLD AUTO: 3 K/UL
LYMPHOCYTES NFR BLD: 44.2 %
MCH RBC QN AUTO: 32.3 PG
MCH RBC QN AUTO: 32.3 PG
MCHC RBC AUTO-ENTMCNC: 32.6 G/DL
MCHC RBC AUTO-ENTMCNC: 32.6 G/DL
MCV RBC AUTO: 99 FL
MCV RBC AUTO: 99 FL
MONOCYTES # BLD AUTO: 0.4 K/UL
MONOCYTES NFR BLD: 6 %
NEUTROPHILS # BLD AUTO: 3.1 K/UL
NEUTROPHILS # BLD AUTO: 3.1 K/UL
NEUTROPHILS NFR BLD: 45.4 %
NONHDLC SERPL-MCNC: 105 MG/DL
PATH REV BLD -IMP: NORMAL
PLATELET # BLD AUTO: 290 K/UL
PLATELET # BLD AUTO: 290 K/UL
PMV BLD AUTO: 10.2 FL
PMV BLD AUTO: 10.2 FL
POTASSIUM SERPL-SCNC: 4.2 MMOL/L
PROT SERPL-MCNC: 7.2 G/DL
RBC # BLD AUTO: 3.44 M/UL
RBC # BLD AUTO: 3.44 M/UL
RETICS/RBC NFR AUTO: 2 %
SODIUM SERPL-SCNC: 139 MMOL/L
TRIGL SERPL-MCNC: 405 MG/DL
TSH SERPL DL<=0.005 MIU/L-ACNC: 2.38 UIU/ML
VIT B12 SERPL-MCNC: 233 PG/ML
WBC # BLD AUTO: 6.88 K/UL
WBC # BLD AUTO: 6.88 K/UL

## 2018-09-05 PROCEDURE — 3078F DIAST BP <80 MM HG: CPT | Mod: CPTII,,, | Performed by: INTERNAL MEDICINE

## 2018-09-05 PROCEDURE — 3075F SYST BP GE 130 - 139MM HG: CPT | Mod: CPTII,,, | Performed by: INTERNAL MEDICINE

## 2018-09-05 PROCEDURE — 85045 AUTOMATED RETICULOCYTE COUNT: CPT

## 2018-09-05 PROCEDURE — 84443 ASSAY THYROID STIM HORMONE: CPT

## 2018-09-05 PROCEDURE — 80053 COMPREHEN METABOLIC PANEL: CPT

## 2018-09-05 PROCEDURE — 80061 LIPID PANEL: CPT

## 2018-09-05 PROCEDURE — 99213 OFFICE O/P EST LOW 20 MIN: CPT | Mod: PBBFAC | Performed by: INTERNAL MEDICINE

## 2018-09-05 PROCEDURE — 85060 BLOOD SMEAR INTERPRETATION: CPT | Mod: ,,, | Performed by: PATHOLOGY

## 2018-09-05 PROCEDURE — 85025 COMPLETE CBC W/AUTO DIFF WBC: CPT

## 2018-09-05 PROCEDURE — 99214 OFFICE O/P EST MOD 30 MIN: CPT | Mod: S$PBB,,, | Performed by: INTERNAL MEDICINE

## 2018-09-05 PROCEDURE — 83921 ORGANIC ACID SINGLE QUANT: CPT

## 2018-09-05 PROCEDURE — 83036 HEMOGLOBIN GLYCOSYLATED A1C: CPT

## 2018-09-05 PROCEDURE — 1101F PT FALLS ASSESS-DOCD LE1/YR: CPT | Mod: CPTII,,, | Performed by: INTERNAL MEDICINE

## 2018-09-05 PROCEDURE — 99999 PR PBB SHADOW E&M-EST. PATIENT-LVL III: CPT | Mod: PBBFAC,,, | Performed by: INTERNAL MEDICINE

## 2018-09-05 PROCEDURE — 82607 VITAMIN B-12: CPT

## 2018-09-05 RX ORDER — GABAPENTIN 300 MG/1
300 CAPSULE ORAL 3 TIMES DAILY
Qty: 90 CAPSULE | Refills: 3 | Status: SHIPPED | OUTPATIENT
Start: 2018-09-05 | End: 2019-10-03 | Stop reason: SDUPTHER

## 2018-09-08 DIAGNOSIS — M10.9 ACUTE GOUT OF FOOT, UNSPECIFIED CAUSE, UNSPECIFIED LATERALITY: ICD-10-CM

## 2018-09-10 LAB — PATH REV BLD -IMP: NORMAL

## 2018-09-10 RX ORDER — ALLOPURINOL 300 MG/1
TABLET ORAL
Qty: 90 TABLET | Refills: 2 | Status: SHIPPED | OUTPATIENT
Start: 2018-09-10 | End: 2019-10-03 | Stop reason: SDUPTHER

## 2018-09-10 RX ORDER — ATORVASTATIN CALCIUM 40 MG/1
TABLET, FILM COATED ORAL
Qty: 90 TABLET | Refills: 2 | Status: SHIPPED | OUTPATIENT
Start: 2018-09-10 | End: 2019-10-03 | Stop reason: SDUPTHER

## 2018-09-11 LAB — METHYLMALONATE SERPL-SCNC: 0.22 UMOL/L

## 2018-12-31 DIAGNOSIS — I10 BENIGN ESSENTIAL HTN: ICD-10-CM

## 2018-12-31 RX ORDER — LOSARTAN POTASSIUM 100 MG/1
100 TABLET ORAL DAILY
Qty: 90 TABLET | Refills: 3 | Status: SHIPPED | OUTPATIENT
Start: 2018-12-31 | End: 2019-09-30 | Stop reason: SDUPTHER

## 2018-12-31 RX ORDER — AMLODIPINE BESYLATE 5 MG/1
TABLET ORAL
Qty: 90 TABLET | Refills: 3 | Status: SHIPPED | OUTPATIENT
Start: 2018-12-31 | End: 2019-09-30 | Stop reason: SDUPTHER

## 2018-12-31 NOTE — TELEPHONE ENCOUNTER
LOV 9/5/18 pt requesting medication refill please advise and authorize.    Thank you.  Leyla RUDOLPH. MA

## 2019-02-27 ENCOUNTER — OFFICE VISIT (OUTPATIENT)
Dept: INTERNAL MEDICINE | Facility: CLINIC | Age: 74
End: 2019-02-27
Attending: INTERNAL MEDICINE
Payer: MEDICARE

## 2019-02-27 VITALS
OXYGEN SATURATION: 98 % | SYSTOLIC BLOOD PRESSURE: 118 MMHG | BODY MASS INDEX: 22.78 KG/M2 | WEIGHT: 141.75 LBS | HEIGHT: 66 IN | HEART RATE: 63 BPM | DIASTOLIC BLOOD PRESSURE: 22 MMHG

## 2019-02-27 DIAGNOSIS — R06.09 DOE (DYSPNEA ON EXERTION): ICD-10-CM

## 2019-02-27 DIAGNOSIS — E78.1 HYPERTRIGLYCERIDEMIA: ICD-10-CM

## 2019-02-27 DIAGNOSIS — R20.9 DISTURBANCE OF SKIN SENSATION: ICD-10-CM

## 2019-02-27 DIAGNOSIS — D64.9 ANEMIA, UNSPECIFIED TYPE: Primary | ICD-10-CM

## 2019-02-27 PROCEDURE — 99999 PR PBB SHADOW E&M-EST. PATIENT-LVL III: CPT | Mod: PBBFAC,,, | Performed by: INTERNAL MEDICINE

## 2019-02-27 PROCEDURE — 99999 PR PBB SHADOW E&M-EST. PATIENT-LVL III: ICD-10-PCS | Mod: PBBFAC,,, | Performed by: INTERNAL MEDICINE

## 2019-02-27 PROCEDURE — 3074F PR MOST RECENT SYSTOLIC BLOOD PRESSURE < 130 MM HG: ICD-10-PCS | Mod: CPTII,S$GLB,, | Performed by: INTERNAL MEDICINE

## 2019-02-27 PROCEDURE — 1101F PR PT FALLS ASSESS DOC 0-1 FALLS W/OUT INJ PAST YR: ICD-10-PCS | Mod: CPTII,S$GLB,, | Performed by: INTERNAL MEDICINE

## 2019-02-27 PROCEDURE — 1101F PT FALLS ASSESS-DOCD LE1/YR: CPT | Mod: CPTII,S$GLB,, | Performed by: INTERNAL MEDICINE

## 2019-02-27 PROCEDURE — 3078F DIAST BP <80 MM HG: CPT | Mod: CPTII,S$GLB,, | Performed by: INTERNAL MEDICINE

## 2019-02-27 PROCEDURE — 3078F PR MOST RECENT DIASTOLIC BLOOD PRESSURE < 80 MM HG: ICD-10-PCS | Mod: CPTII,S$GLB,, | Performed by: INTERNAL MEDICINE

## 2019-02-27 PROCEDURE — 99214 PR OFFICE/OUTPT VISIT, EST, LEVL IV, 30-39 MIN: ICD-10-PCS | Mod: S$GLB,,, | Performed by: INTERNAL MEDICINE

## 2019-02-27 PROCEDURE — 99214 OFFICE O/P EST MOD 30 MIN: CPT | Mod: S$GLB,,, | Performed by: INTERNAL MEDICINE

## 2019-02-27 PROCEDURE — 3074F SYST BP LT 130 MM HG: CPT | Mod: CPTII,S$GLB,, | Performed by: INTERNAL MEDICINE

## 2019-02-27 NOTE — PROGRESS NOTES
"Subjective:       Patient ID: Rico Park Jr. is a 73 y.o. male.    Chief Complaint: Follow-up    Here for routine f/u    Hx of macrocytic anemia. Followed by Dr Sanabria. Last rec was for BM biopsy . Pt deferred due to being asymptomatic and is monitoring. He denies night sweats, unexplained weight loss, easy bruising/bleeding, recurrent infection, bone pains, malaise.     He is no longer running 5 times a week and his stamina has decreased.     Pt is tolerating statin without frequent muscle cramps or diffuse myalgias or weakness.                    Review of Systems   Constitutional: Negative for activity change and unexpected weight change.   HENT: Negative for hearing loss, rhinorrhea and trouble swallowing.    Eyes: Negative for discharge and visual disturbance.   Respiratory: Negative for chest tightness and wheezing.    Cardiovascular: Negative for chest pain and palpitations.   Gastrointestinal: Negative for blood in stool, constipation, diarrhea and vomiting.   Endocrine: Negative for polydipsia and polyuria.   Genitourinary: Negative for difficulty urinating, hematuria and urgency.   Musculoskeletal: Negative for arthralgias, joint swelling and neck pain.   Neurological: Negative for weakness and headaches.   Psychiatric/Behavioral: Negative for confusion and dysphoric mood.       Objective:      Vitals:    02/27/19 0831   BP: (!) 118/22   Pulse: 63   SpO2: 98%   Weight: 64.3 kg (141 lb 12.1 oz)   Height: 5' 6" (1.676 m)      Physical Exam   Constitutional: He is oriented to person, place, and time. He appears well-developed and well-nourished. No distress.   HENT:   Head: Normocephalic and atraumatic.   Mouth/Throat: Oropharynx is clear and moist. No oropharyngeal exudate.   Eyes: Conjunctivae and EOM are normal. Pupils are equal, round, and reactive to light. No scleral icterus.   Neck: No thyromegaly present.   Cardiovascular: Normal rate, regular rhythm and normal heart sounds.   No murmur " heard.  Pulmonary/Chest: Effort normal and breath sounds normal. He has no wheezes. He has no rales.   Abdominal: Soft. He exhibits no distension. There is no tenderness.   Musculoskeletal: He exhibits no edema or tenderness.   Lymphadenopathy:     He has no cervical adenopathy.   Neurological: He is alert and oriented to person, place, and time.   Skin: Skin is warm and dry.   Psychiatric: He has a normal mood and affect. His behavior is normal.       Assessment:       1. Anemia, unspecified type    2. Hypertriglyceridemia    3. BROOKE (dyspnea on exertion)    4. Disturbance of skin sensation         Plan:       Rico was seen today for follow-up.    Diagnoses and all orders for this visit:    Anemia, unspecified type  -     CBC auto differential; Future  -     Vitamin B12; Future  -     Reticulocytes; Future    Hypertriglyceridemia  -     Lipid panel; Future    BROOKE (dyspnea on exertion)  -     SCHEDULED EKG 12-LEAD (to [place]); Future    RTC in 6 months or sooner prn                    Side effects of medication(s) were discussed in detail and patient voiced understanding.  Patient will call back for any issues or complications.

## 2019-02-28 ENCOUNTER — HOSPITAL ENCOUNTER (OUTPATIENT)
Dept: CARDIOLOGY | Facility: OTHER | Age: 74
Discharge: HOME | End: 2019-02-28
Attending: INTERNAL MEDICINE
Payer: MEDICARE

## 2019-02-28 DIAGNOSIS — R06.09 DOE (DYSPNEA ON EXERTION): ICD-10-CM

## 2019-02-28 PROCEDURE — 93010 ELECTROCARDIOGRAM REPORT: CPT | Mod: ,,, | Performed by: INTERNAL MEDICINE

## 2019-02-28 PROCEDURE — 93005 ELECTROCARDIOGRAM TRACING: CPT

## 2019-02-28 PROCEDURE — 93010 EKG 12-LEAD: ICD-10-PCS | Mod: ,,, | Performed by: INTERNAL MEDICINE

## 2019-09-24 ENCOUNTER — OFFICE VISIT (OUTPATIENT)
Dept: INTERNAL MEDICINE | Facility: CLINIC | Age: 74
End: 2019-09-24
Attending: INTERNAL MEDICINE
Payer: MEDICARE

## 2019-09-24 ENCOUNTER — LAB VISIT (OUTPATIENT)
Dept: LAB | Facility: OTHER | Age: 74
End: 2019-09-24
Attending: INTERNAL MEDICINE
Payer: MEDICARE

## 2019-09-24 VITALS
WEIGHT: 137.81 LBS | OXYGEN SATURATION: 97 % | HEIGHT: 66 IN | HEART RATE: 65 BPM | BODY MASS INDEX: 22.15 KG/M2 | SYSTOLIC BLOOD PRESSURE: 132 MMHG | DIASTOLIC BLOOD PRESSURE: 64 MMHG

## 2019-09-24 DIAGNOSIS — E78.1 HYPERTRIGLYCERIDEMIA: ICD-10-CM

## 2019-09-24 DIAGNOSIS — Z00.00 ANNUAL PHYSICAL EXAM: Primary | ICD-10-CM

## 2019-09-24 DIAGNOSIS — Z12.5 PROSTATE CANCER SCREENING: ICD-10-CM

## 2019-09-24 DIAGNOSIS — I10 BENIGN ESSENTIAL HTN: ICD-10-CM

## 2019-09-24 DIAGNOSIS — R79.9 ABNORMAL FINDING OF BLOOD CHEMISTRY: ICD-10-CM

## 2019-09-24 DIAGNOSIS — R20.0 ANESTHESIA OF SKIN: ICD-10-CM

## 2019-09-24 DIAGNOSIS — D64.9 ANEMIA, UNSPECIFIED TYPE: ICD-10-CM

## 2019-09-24 LAB
ALBUMIN SERPL BCP-MCNC: 4.5 G/DL (ref 3.5–5.2)
ALP SERPL-CCNC: 89 U/L (ref 55–135)
ALT SERPL W/O P-5'-P-CCNC: 23 U/L (ref 10–44)
ANION GAP SERPL CALC-SCNC: 15 MMOL/L (ref 8–16)
AST SERPL-CCNC: 32 U/L (ref 10–40)
BASOPHILS # BLD AUTO: 0.06 K/UL (ref 0–0.2)
BASOPHILS NFR BLD: 0.7 % (ref 0–1.9)
BILIRUB SERPL-MCNC: 0.7 MG/DL (ref 0.1–1)
BUN SERPL-MCNC: 11 MG/DL (ref 8–23)
CALCIUM SERPL-MCNC: 9.8 MG/DL (ref 8.7–10.5)
CHLORIDE SERPL-SCNC: 100 MMOL/L (ref 95–110)
CHOLEST SERPL-MCNC: 156 MG/DL (ref 120–199)
CHOLEST/HDLC SERPL: 2.1 {RATIO} (ref 2–5)
CO2 SERPL-SCNC: 23 MMOL/L (ref 23–29)
COMPLEXED PSA SERPL-MCNC: 2.4 NG/ML (ref 0–4)
CREAT SERPL-MCNC: 1 MG/DL (ref 0.5–1.4)
DIFFERENTIAL METHOD: ABNORMAL
EOSINOPHIL # BLD AUTO: 0.2 K/UL (ref 0–0.5)
EOSINOPHIL NFR BLD: 2.4 % (ref 0–8)
ERYTHROCYTE [DISTWIDTH] IN BLOOD BY AUTOMATED COUNT: 12 % (ref 11.5–14.5)
EST. GFR  (AFRICAN AMERICAN): >60 ML/MIN/1.73 M^2
EST. GFR  (NON AFRICAN AMERICAN): >60 ML/MIN/1.73 M^2
ESTIMATED AVG GLUCOSE: 105 MG/DL (ref 68–131)
FERRITIN SERPL-MCNC: 850 NG/ML (ref 20–300)
GLUCOSE SERPL-MCNC: 101 MG/DL (ref 70–110)
HBA1C MFR BLD HPLC: 5.3 % (ref 4–5.6)
HCT VFR BLD AUTO: 36.8 % (ref 40–54)
HDLC SERPL-MCNC: 74 MG/DL (ref 40–75)
HDLC SERPL: 47.4 % (ref 20–50)
HGB BLD-MCNC: 12.1 G/DL (ref 14–18)
IMM GRANULOCYTES # BLD AUTO: 0.03 K/UL (ref 0–0.04)
IMM GRANULOCYTES NFR BLD AUTO: 0.4 % (ref 0–0.5)
IRON SERPL-MCNC: 112 UG/DL (ref 45–160)
LDLC SERPL CALC-MCNC: 24 MG/DL (ref 63–159)
LYMPHOCYTES # BLD AUTO: 2.9 K/UL (ref 1–4.8)
LYMPHOCYTES NFR BLD: 34.9 % (ref 18–48)
MCH RBC QN AUTO: 32.2 PG (ref 27–31)
MCHC RBC AUTO-ENTMCNC: 32.9 G/DL (ref 32–36)
MCV RBC AUTO: 98 FL (ref 82–98)
MONOCYTES # BLD AUTO: 0.5 K/UL (ref 0.3–1)
MONOCYTES NFR BLD: 6.4 % (ref 4–15)
NEUTROPHILS # BLD AUTO: 4.6 K/UL (ref 1.8–7.7)
NEUTROPHILS NFR BLD: 55.2 % (ref 38–73)
NONHDLC SERPL-MCNC: 82 MG/DL
NRBC BLD-RTO: 0 /100 WBC
PLATELET # BLD AUTO: 259 K/UL (ref 150–350)
PMV BLD AUTO: 10.8 FL (ref 9.2–12.9)
POTASSIUM SERPL-SCNC: 3.7 MMOL/L (ref 3.5–5.1)
PROT SERPL-MCNC: 8.1 G/DL (ref 6–8.4)
RBC # BLD AUTO: 3.76 M/UL (ref 4.6–6.2)
RETICS/RBC NFR AUTO: 1.7 % (ref 0.4–2)
SATURATED IRON: 29 % (ref 20–50)
SODIUM SERPL-SCNC: 138 MMOL/L (ref 136–145)
TOTAL IRON BINDING CAPACITY: 380 UG/DL (ref 250–450)
TRANSFERRIN SERPL-MCNC: 257 MG/DL (ref 200–375)
TRIGL SERPL-MCNC: 290 MG/DL (ref 30–150)
TSH SERPL DL<=0.005 MIU/L-ACNC: 3.3 UIU/ML (ref 0.4–4)
WBC # BLD AUTO: 8.27 K/UL (ref 3.9–12.7)

## 2019-09-24 PROCEDURE — 84153 ASSAY OF PSA TOTAL: CPT

## 2019-09-24 PROCEDURE — 36415 COLL VENOUS BLD VENIPUNCTURE: CPT

## 2019-09-24 PROCEDURE — 80053 COMPREHEN METABOLIC PANEL: CPT

## 2019-09-24 PROCEDURE — 1101F PR PT FALLS ASSESS DOC 0-1 FALLS W/OUT INJ PAST YR: ICD-10-PCS | Mod: CPTII,S$GLB,, | Performed by: INTERNAL MEDICINE

## 2019-09-24 PROCEDURE — 3078F PR MOST RECENT DIASTOLIC BLOOD PRESSURE < 80 MM HG: ICD-10-PCS | Mod: CPTII,S$GLB,, | Performed by: INTERNAL MEDICINE

## 2019-09-24 PROCEDURE — 85025 COMPLETE CBC W/AUTO DIFF WBC: CPT

## 2019-09-24 PROCEDURE — 85045 AUTOMATED RETICULOCYTE COUNT: CPT

## 2019-09-24 PROCEDURE — 83036 HEMOGLOBIN GLYCOSYLATED A1C: CPT

## 2019-09-24 PROCEDURE — 99999 PR PBB SHADOW E&M-EST. PATIENT-LVL III: ICD-10-PCS | Mod: PBBFAC,,, | Performed by: INTERNAL MEDICINE

## 2019-09-24 PROCEDURE — 1101F PT FALLS ASSESS-DOCD LE1/YR: CPT | Mod: CPTII,S$GLB,, | Performed by: INTERNAL MEDICINE

## 2019-09-24 PROCEDURE — 3078F DIAST BP <80 MM HG: CPT | Mod: CPTII,S$GLB,, | Performed by: INTERNAL MEDICINE

## 2019-09-24 PROCEDURE — 84443 ASSAY THYROID STIM HORMONE: CPT

## 2019-09-24 PROCEDURE — 3075F SYST BP GE 130 - 139MM HG: CPT | Mod: CPTII,S$GLB,, | Performed by: INTERNAL MEDICINE

## 2019-09-24 PROCEDURE — 99999 PR PBB SHADOW E&M-EST. PATIENT-LVL III: CPT | Mod: PBBFAC,,, | Performed by: INTERNAL MEDICINE

## 2019-09-24 PROCEDURE — 3075F PR MOST RECENT SYSTOLIC BLOOD PRESS GE 130-139MM HG: ICD-10-PCS | Mod: CPTII,S$GLB,, | Performed by: INTERNAL MEDICINE

## 2019-09-24 PROCEDURE — 80061 LIPID PANEL: CPT

## 2019-09-24 PROCEDURE — 82728 ASSAY OF FERRITIN: CPT

## 2019-09-24 PROCEDURE — 83540 ASSAY OF IRON: CPT

## 2019-09-24 PROCEDURE — 99214 PR OFFICE/OUTPT VISIT, EST, LEVL IV, 30-39 MIN: ICD-10-PCS | Mod: S$GLB,,, | Performed by: INTERNAL MEDICINE

## 2019-09-24 PROCEDURE — 99214 OFFICE O/P EST MOD 30 MIN: CPT | Mod: S$GLB,,, | Performed by: INTERNAL MEDICINE

## 2019-09-24 NOTE — PROGRESS NOTES
Subjective:       Patient ID: Rico Park Jr. is a 74 y.o. male.    Chief Complaint: Follow-up    Here for routine f/u    Hx of macrocytic anemia. Followed by Dr Sanabria. Last rec was for BM biopsy . Pt deferred due to being asymptomatic and is monitoring. He denies night sweats, unexplained weight loss, easy bruising/bleeding, recurrent infection, bone pains, malaise.      He exercises regularly and stamina is fine. Denies CP at rest or with exertion, dizziness with exertion, shortness of breath out of proportion to level of activity, frequent or sustained palpitations, orthopnea, PND, or LE edema.       Pt is tolerating statin without frequent muscle cramps or diffuse myalgias or weakness.         Review of Systems   Constitutional: Negative for appetite change, chills, fever and unexpected weight change.   HENT: Negative for hearing loss, sore throat and trouble swallowing.    Eyes: Negative for visual disturbance.   Respiratory: Negative for cough, chest tightness and shortness of breath.    Cardiovascular: Negative for chest pain and leg swelling.   Gastrointestinal: Negative for abdominal pain, blood in stool, constipation, diarrhea, nausea and vomiting.   Endocrine: Negative for polydipsia and polyuria.   Genitourinary: Negative for decreased urine volume, difficulty urinating, dysuria, frequency and urgency.   Musculoskeletal: Negative for gait problem.   Skin: Negative for rash.   Neurological: Negative for dizziness and numbness.   Psychiatric/Behavioral: The patient is not nervous/anxious.       Subjective:       Patient ID: Rico Park Jr. is a 73 y.o. male.    Chief Complaint: Follow-up    Here for routine f/u    Hx of macrocytic anemia. Followed by Dr Sanabria. Last rec was for BM biopsy . Pt deferred due to being asymptomatic and is monitoring. He denies night sweats, unexplained weight loss, easy bruising/bleeding, recurrent infection, bone pains, malaise.     He is no longer running 5 times a  "week and his stamina has decreased.     Pt is tolerating statin without frequent muscle cramps or diffuse myalgias or weakness.                    Review of Systems   Constitutional: Negative for activity change and unexpected weight change.   HENT: Negative for hearing loss, rhinorrhea and trouble swallowing.    Eyes: Negative for discharge and visual disturbance.   Respiratory: Negative for chest tightness and wheezing.    Cardiovascular: Negative for chest pain and palpitations.   Gastrointestinal: Negative for blood in stool, constipation, diarrhea and vomiting.   Endocrine: Negative for polydipsia and polyuria.   Genitourinary: Negative for difficulty urinating, hematuria and urgency.   Musculoskeletal: Negative for arthralgias, joint swelling and neck pain.   Neurological: Negative for weakness and headaches.   Psychiatric/Behavioral: Negative for confusion and dysphoric mood.       Objective:      Vitals:    02/27/19 0831   BP: (!) 118/22   Pulse: 63   SpO2: 98%   Weight: 64.3 kg (141 lb 12.1 oz)   Height: 5' 6" (1.676 m)      Physical Exam   Constitutional: He is oriented to person, place, and time. He appears well-developed and well-nourished. No distress.   HENT:   Head: Normocephalic and atraumatic.   Mouth/Throat: Oropharynx is clear and moist. No oropharyngeal exudate.   Eyes: Conjunctivae and EOM are normal. Pupils are equal, round, and reactive to light. No scleral icterus.   Neck: No thyromegaly present.   Cardiovascular: Normal rate, regular rhythm and normal heart sounds.   No murmur heard.  Pulmonary/Chest: Effort normal and breath sounds normal. He has no wheezes. He has no rales.   Abdominal: Soft. He exhibits no distension. There is no tenderness.   Musculoskeletal: He exhibits no edema or tenderness.   Lymphadenopathy:     He has no cervical adenopathy.   Neurological: He is alert and oriented to person, place, and time.   Skin: Skin is warm and dry.   Psychiatric: He has a normal mood and " "affect. His behavior is normal.       Assessment:       1. Anemia, unspecified type    2. Hypertriglyceridemia    3. BROOKE (dyspnea on exertion)    4. Disturbance of skin sensation         Plan:       Rico was seen today for follow-up.    Diagnoses and all orders for this visit:    Anemia, unspecified type  -     CBC auto differential; Future  -     Vitamin B12; Future  -     Reticulocytes; Future    Hypertriglyceridemia  -     Lipid panel; Future    BROOKE (dyspnea on exertion)  -     SCHEDULED EKG 12-LEAD (to [place]); Future    RTC in 6 months or sooner prn                    Side effects of medication(s) were discussed in detail and patient voiced understanding.  Patient will call back for any issues or complications.    Objective:      Vitals:    09/24/19 0919   BP: (!) 142/66   Pulse: 65   SpO2: 97%   Weight: 62.5 kg (137 lb 12.6 oz)   Height: 5' 6" (1.676 m)      Physical Exam   Constitutional: He is oriented to person, place, and time. He appears well-developed and well-nourished. No distress.   HENT:   Head: Normocephalic and atraumatic.   Mouth/Throat: Oropharynx is clear and moist. No oropharyngeal exudate.   Eyes: Pupils are equal, round, and reactive to light. Conjunctivae and EOM are normal. No scleral icterus.   Neck: No thyromegaly present.   Cardiovascular: Normal rate, regular rhythm and normal heart sounds.   No murmur heard.  Pulmonary/Chest: Effort normal and breath sounds normal. He has no wheezes. He has no rales.   Abdominal: Soft. He exhibits no distension. There is no tenderness.   Musculoskeletal: He exhibits no edema or tenderness.   Lymphadenopathy:        Head (right side): No submental, no submandibular, no tonsillar, no preauricular and no posterior auricular adenopathy present.        Head (left side): No submental, no submandibular, no tonsillar, no preauricular, no posterior auricular and no occipital adenopathy present.     He has no cervical adenopathy.        Right cervical: No " superficial cervical adenopathy present.       Left cervical: No superficial cervical adenopathy present.        Right: No inguinal and no supraclavicular adenopathy present.        Left: No inguinal and no supraclavicular adenopathy present.   Neurological: He is alert and oriented to person, place, and time.   Skin: Skin is warm and dry.   Psychiatric: He has a normal mood and affect. His behavior is normal.       Assessment:       1. Annual physical exam    2. Hypertriglyceridemia    3. Anemia, unspecified type    4. Benign essential HTN    5. Prostate cancer screening    6. Abnormal finding of blood chemistry     7. Anesthesia of skin         Plan:       Rico was seen today for follow-up.    Diagnoses and all orders for this visit:    Annual physical exam    Hypertriglyceridemia  -     Hemoglobin A1c; Future    Anemia, unspecified type  -     Lipid panel; Future  -     TSH; Future  -     CBC auto differential; Future  -     Reticulocytes; Future  -     Ferritin; Future  -     Iron and TIBC; Future  -     Vitamin B12; Future    Benign essential HTN   controlled. Continue current regimen    Prostate cancer screening  -     Comprehensive metabolic panel; Future  -     Lipid panel; Future  -     PSA, Screening; Future    Abnormal finding of blood chemistry   -     Lipid panel; Future  -     Hemoglobin A1c; Future    Anesthesia of skin   -     Vitamin B12; Future       RTC in 6 months or sooner koki Pfeiffer MD  Internal Medicine-[facility]        Side effects of medication(s) were discussed in detail and patient voiced understanding.  Patient will call back for any issues or complications.

## 2019-09-25 ENCOUNTER — IMMUNIZATION (OUTPATIENT)
Dept: PHARMACY | Facility: CLINIC | Age: 74
End: 2019-09-25
Payer: MEDICARE

## 2019-09-25 ENCOUNTER — IMMUNIZATION (OUTPATIENT)
Dept: PHARMACY | Facility: CLINIC | Age: 74
End: 2019-09-25

## 2019-10-03 DIAGNOSIS — E78.5 HYPERLIPIDEMIA, UNSPECIFIED HYPERLIPIDEMIA TYPE: Primary | ICD-10-CM

## 2019-10-03 DIAGNOSIS — M10.9 ACUTE GOUT OF FOOT, UNSPECIFIED CAUSE, UNSPECIFIED LATERALITY: ICD-10-CM

## 2019-10-04 RX ORDER — ALLOPURINOL 300 MG/1
300 TABLET ORAL DAILY
Qty: 90 TABLET | Refills: 2 | Status: SHIPPED | OUTPATIENT
Start: 2019-10-04 | End: 2020-08-19

## 2019-10-04 RX ORDER — ATORVASTATIN CALCIUM 40 MG/1
40 TABLET, FILM COATED ORAL DAILY
Qty: 90 TABLET | Refills: 2 | Status: SHIPPED | OUTPATIENT
Start: 2019-10-04 | End: 2020-08-19

## 2019-10-04 RX ORDER — GABAPENTIN 300 MG/1
300 CAPSULE ORAL 3 TIMES DAILY
Qty: 90 CAPSULE | Refills: 3 | Status: SHIPPED | OUTPATIENT
Start: 2019-10-04 | End: 2020-11-16

## 2019-10-22 ENCOUNTER — CLINICAL SUPPORT (OUTPATIENT)
Dept: INTERNAL MEDICINE | Facility: CLINIC | Age: 74
End: 2019-10-22
Payer: MEDICARE

## 2019-10-22 VITALS — OXYGEN SATURATION: 96 % | DIASTOLIC BLOOD PRESSURE: 62 MMHG | SYSTOLIC BLOOD PRESSURE: 110 MMHG | HEART RATE: 63 BPM

## 2019-10-22 PROCEDURE — 99999 PR PBB SHADOW E&M-EST. PATIENT-LVL II: ICD-10-PCS | Mod: PBBFAC,,,

## 2019-10-22 PROCEDURE — 99999 PR PBB SHADOW E&M-EST. PATIENT-LVL II: CPT | Mod: PBBFAC,,,

## 2019-10-22 NOTE — Clinical Note
Does patient have record of home blood pressure readings no. Readings have been averaging n/a. Last dose of blood pressure medication was taken at 7:30 am.Patient is asymptomatic. BP: 110/62 , Pulse: 63.Dr. Stokes notified.

## 2019-10-22 NOTE — PROGRESS NOTES
Rico Park . 74 y.o. male is here today for Blood Pressure check.   History of HTN yes.    Review of patient's allergies indicates:   Allergen Reactions    Pcn [penicillins] Rash     Creatinine   Date Value Ref Range Status   09/24/2019 1.0 0.5 - 1.4 mg/dL Final     Sodium   Date Value Ref Range Status   09/24/2019 138 136 - 145 mmol/L Final     Potassium   Date Value Ref Range Status   09/24/2019 3.7 3.5 - 5.1 mmol/L Final   ]  Patient verifies taking blood pressure medications on a regular bases at the same time of the day.     Current Outpatient Medications:     amLODIPine (NORVASC) 5 MG tablet, Take 1 tablet (5 mg total) by mouth once daily., Disp: 90 tablet, Rfl: 3    losartan (COZAAR) 100 MG tablet, Take 1 tablet (100 mg total) by mouth once daily., Disp: 90 tablet, Rfl: 3    allopurinol (ZYLOPRIM) 300 MG tablet, Take 1 tablet (300 mg total) by mouth once daily., Disp: 90 tablet, Rfl: 2    atorvastatin (LIPITOR) 40 MG tablet, Take 1 tablet (40 mg total) by mouth once daily., Disp: 90 tablet, Rfl: 2    diphth,pertus,acell,,tetanus (BOOSTRIX) 2.5-8-5 Lf-mcg-Lf/0.5mL Syrg injection, Inject into the muscle., Disp: 0.5 mL, Rfl: 0    gabapentin (NEURONTIN) 300 MG capsule, Take 1 capsule (300 mg total) by mouth 3 (three) times daily., Disp: 90 capsule, Rfl: 3    ibuprofen (ADVIL,MOTRIN) 800 MG tablet, Take 1 tablet (800 mg total) by mouth every 8 (eight) hours., Disp: 42 tablet, Rfl: 0    pneumoc 13-zahida conj-dip cr,PF, 0.5 mL Syrg, Inject into the muscle., Disp: 0.5 mL, Rfl: 0  Does patient have record of home blood pressure readings no. Readings have been averaging n/a.   Last dose of blood pressure medication was taken at 7:30 am.  Patient is asymptomatic.     BP: 110/62 , Pulse: 63.    Dr. Stokes notified.

## 2019-11-27 ENCOUNTER — IMMUNIZATION (OUTPATIENT)
Dept: PHARMACY | Facility: CLINIC | Age: 74
End: 2019-11-27
Payer: MEDICARE

## 2020-08-19 ENCOUNTER — OFFICE VISIT (OUTPATIENT)
Dept: INTERNAL MEDICINE | Facility: CLINIC | Age: 75
End: 2020-08-19
Attending: INTERNAL MEDICINE
Payer: MEDICARE

## 2020-08-19 ENCOUNTER — LAB VISIT (OUTPATIENT)
Dept: LAB | Facility: OTHER | Age: 75
End: 2020-08-19
Attending: INTERNAL MEDICINE
Payer: MEDICARE

## 2020-08-19 VITALS
HEART RATE: 67 BPM | HEIGHT: 66 IN | SYSTOLIC BLOOD PRESSURE: 134 MMHG | BODY MASS INDEX: 22.18 KG/M2 | WEIGHT: 138 LBS | OXYGEN SATURATION: 98 % | DIASTOLIC BLOOD PRESSURE: 60 MMHG

## 2020-08-19 DIAGNOSIS — D64.9 ANEMIA, UNSPECIFIED TYPE: ICD-10-CM

## 2020-08-19 DIAGNOSIS — R20.0 ANESTHESIA OF SKIN: ICD-10-CM

## 2020-08-19 DIAGNOSIS — Z12.5 PROSTATE CANCER SCREENING: Primary | ICD-10-CM

## 2020-08-19 DIAGNOSIS — Z00.00 ANNUAL PHYSICAL EXAM: ICD-10-CM

## 2020-08-19 DIAGNOSIS — R79.9 ABNORMAL FINDING OF BLOOD CHEMISTRY, UNSPECIFIED: ICD-10-CM

## 2020-08-19 DIAGNOSIS — R23.4 CHANGES IN SKIN TEXTURE: ICD-10-CM

## 2020-08-19 LAB
ALBUMIN SERPL BCP-MCNC: 3.7 G/DL (ref 3.5–5.2)
ALP SERPL-CCNC: 72 U/L (ref 55–135)
ALT SERPL W/O P-5'-P-CCNC: 23 U/L (ref 10–44)
ANION GAP SERPL CALC-SCNC: 10 MMOL/L (ref 8–16)
AST SERPL-CCNC: 35 U/L (ref 10–40)
BASOPHILS # BLD AUTO: 0.08 K/UL (ref 0–0.2)
BASOPHILS NFR BLD: 0.8 % (ref 0–1.9)
BILIRUB SERPL-MCNC: 0.5 MG/DL (ref 0.1–1)
BUN SERPL-MCNC: 15 MG/DL (ref 8–23)
CALCIUM SERPL-MCNC: 8.8 MG/DL (ref 8.7–10.5)
CHLORIDE SERPL-SCNC: 105 MMOL/L (ref 95–110)
CO2 SERPL-SCNC: 21 MMOL/L (ref 23–29)
CREAT SERPL-MCNC: 1 MG/DL (ref 0.5–1.4)
DIFFERENTIAL METHOD: ABNORMAL
EOSINOPHIL # BLD AUTO: 0.4 K/UL (ref 0–0.5)
EOSINOPHIL NFR BLD: 4.2 % (ref 0–8)
ERYTHROCYTE [DISTWIDTH] IN BLOOD BY AUTOMATED COUNT: 12.5 % (ref 11.5–14.5)
EST. GFR  (AFRICAN AMERICAN): >60 ML/MIN/1.73 M^2
EST. GFR  (NON AFRICAN AMERICAN): >60 ML/MIN/1.73 M^2
GLUCOSE SERPL-MCNC: 100 MG/DL (ref 70–110)
HCT VFR BLD AUTO: 33.1 % (ref 40–54)
HGB BLD-MCNC: 10.8 G/DL (ref 14–18)
IMM GRANULOCYTES # BLD AUTO: 0.04 K/UL (ref 0–0.04)
IMM GRANULOCYTES NFR BLD AUTO: 0.4 % (ref 0–0.5)
LYMPHOCYTES # BLD AUTO: 2.8 K/UL (ref 1–4.8)
LYMPHOCYTES NFR BLD: 30 % (ref 18–48)
MCH RBC QN AUTO: 32.6 PG (ref 27–31)
MCHC RBC AUTO-ENTMCNC: 32.6 G/DL (ref 32–36)
MCV RBC AUTO: 100 FL (ref 82–98)
MONOCYTES # BLD AUTO: 0.6 K/UL (ref 0.3–1)
MONOCYTES NFR BLD: 6.8 % (ref 4–15)
NEUTROPHILS # BLD AUTO: 5.5 K/UL (ref 1.8–7.7)
NEUTROPHILS NFR BLD: 57.8 % (ref 38–73)
NRBC BLD-RTO: 0 /100 WBC
PLATELET # BLD AUTO: 231 K/UL (ref 150–350)
PMV BLD AUTO: 10 FL (ref 9.2–12.9)
POTASSIUM SERPL-SCNC: 4.2 MMOL/L (ref 3.5–5.1)
PROT SERPL-MCNC: 6.9 G/DL (ref 6–8.4)
RBC # BLD AUTO: 3.31 M/UL (ref 4.6–6.2)
SODIUM SERPL-SCNC: 136 MMOL/L (ref 136–145)
TSH SERPL DL<=0.005 MIU/L-ACNC: 3.13 UIU/ML (ref 0.4–4)
WBC # BLD AUTO: 9.47 K/UL (ref 3.9–12.7)

## 2020-08-19 PROCEDURE — 3008F PR BODY MASS INDEX (BMI) DOCUMENTED: ICD-10-PCS | Mod: CPTII,S$GLB,, | Performed by: INTERNAL MEDICINE

## 2020-08-19 PROCEDURE — 83036 HEMOGLOBIN GLYCOSYLATED A1C: CPT

## 2020-08-19 PROCEDURE — 1101F PT FALLS ASSESS-DOCD LE1/YR: CPT | Mod: CPTII,S$GLB,, | Performed by: INTERNAL MEDICINE

## 2020-08-19 PROCEDURE — 83540 ASSAY OF IRON: CPT

## 2020-08-19 PROCEDURE — 3078F DIAST BP <80 MM HG: CPT | Mod: CPTII,S$GLB,, | Performed by: INTERNAL MEDICINE

## 2020-08-19 PROCEDURE — 36415 COLL VENOUS BLD VENIPUNCTURE: CPT

## 2020-08-19 PROCEDURE — 84443 ASSAY THYROID STIM HORMONE: CPT

## 2020-08-19 PROCEDURE — 80053 COMPREHEN METABOLIC PANEL: CPT

## 2020-08-19 PROCEDURE — 99214 OFFICE O/P EST MOD 30 MIN: CPT | Mod: S$GLB,,, | Performed by: INTERNAL MEDICINE

## 2020-08-19 PROCEDURE — 99214 PR OFFICE/OUTPT VISIT, EST, LEVL IV, 30-39 MIN: ICD-10-PCS | Mod: S$GLB,,, | Performed by: INTERNAL MEDICINE

## 2020-08-19 PROCEDURE — 3078F PR MOST RECENT DIASTOLIC BLOOD PRESSURE < 80 MM HG: ICD-10-PCS | Mod: CPTII,S$GLB,, | Performed by: INTERNAL MEDICINE

## 2020-08-19 PROCEDURE — 3008F BODY MASS INDEX DOCD: CPT | Mod: CPTII,S$GLB,, | Performed by: INTERNAL MEDICINE

## 2020-08-19 PROCEDURE — 85025 COMPLETE CBC W/AUTO DIFF WBC: CPT

## 2020-08-19 PROCEDURE — 1126F AMNT PAIN NOTED NONE PRSNT: CPT | Mod: S$GLB,,, | Performed by: INTERNAL MEDICINE

## 2020-08-19 PROCEDURE — 99999 PR PBB SHADOW E&M-EST. PATIENT-LVL III: ICD-10-PCS | Mod: PBBFAC,,, | Performed by: INTERNAL MEDICINE

## 2020-08-19 PROCEDURE — 1126F PR PAIN SEVERITY QUANTIFIED, NO PAIN PRESENT: ICD-10-PCS | Mod: S$GLB,,, | Performed by: INTERNAL MEDICINE

## 2020-08-19 PROCEDURE — 3075F PR MOST RECENT SYSTOLIC BLOOD PRESS GE 130-139MM HG: ICD-10-PCS | Mod: CPTII,S$GLB,, | Performed by: INTERNAL MEDICINE

## 2020-08-19 PROCEDURE — 1101F PR PT FALLS ASSESS DOC 0-1 FALLS W/OUT INJ PAST YR: ICD-10-PCS | Mod: CPTII,S$GLB,, | Performed by: INTERNAL MEDICINE

## 2020-08-19 PROCEDURE — 82607 VITAMIN B-12: CPT

## 2020-08-19 PROCEDURE — 3075F SYST BP GE 130 - 139MM HG: CPT | Mod: CPTII,S$GLB,, | Performed by: INTERNAL MEDICINE

## 2020-08-19 PROCEDURE — 1159F MED LIST DOCD IN RCRD: CPT | Mod: S$GLB,,, | Performed by: INTERNAL MEDICINE

## 2020-08-19 PROCEDURE — 82728 ASSAY OF FERRITIN: CPT

## 2020-08-19 PROCEDURE — 99999 PR PBB SHADOW E&M-EST. PATIENT-LVL III: CPT | Mod: PBBFAC,,, | Performed by: INTERNAL MEDICINE

## 2020-08-19 PROCEDURE — 1159F PR MEDICATION LIST DOCUMENTED IN MEDICAL RECORD: ICD-10-PCS | Mod: S$GLB,,, | Performed by: INTERNAL MEDICINE

## 2020-08-19 PROCEDURE — 80061 LIPID PANEL: CPT

## 2020-08-19 NOTE — PROGRESS NOTES
Here for annual exam    Subjective:       Patient ID: Rico Park Jr. is a 74 y.o. male.    Chief Complaint: No chief complaint on file.    HPI  Subjective:       Patient ID: Rico Park Jr. is a 74 y.o. male.    Chief Complaint: No chief complaint on file.    Here for routine f/u    Hx of macrocytic anemia. Followed by Dr Sanabria. Last rec was for BM biopsy . Pt deferred due to being asymptomatic and is monitoring. He denies night sweats, unexplained weight loss, easy bruising/bleeding, recurrent infection, bone pains, malaise.      He exercises regularly and stamina is fine. Denies CP at rest or with exertion, dizziness with exertion, shortness of breath out of proportion to level of activity, frequent or sustained palpitations, orthopnea, PND, or LE edema.       Pt is tolerating statin without frequent muscle cramps or diffuse myalgias or weakness.         Review of Systems   Constitutional: Negative for appetite change, chills, fever and unexpected weight change.   HENT: Negative for hearing loss, sore throat and trouble swallowing.    Eyes: Negative for visual disturbance.   Respiratory: Negative for cough, chest tightness and shortness of breath.    Cardiovascular: Negative for chest pain and leg swelling.   Gastrointestinal: Negative for abdominal pain, blood in stool, constipation, diarrhea, nausea and vomiting.   Endocrine: Negative for polydipsia and polyuria.   Genitourinary: Negative for decreased urine volume, difficulty urinating, dysuria, frequency and urgency.   Musculoskeletal: Negative for gait problem.   Skin: Negative for rash.   Neurological: Negative for dizziness and numbness.   Psychiatric/Behavioral: The patient is not nervous/anxious.       Subjective:       Patient ID: Rico Park Jr. is a 73 y.o. male.    Chief Complaint: Follow-up    Here for routine f/u    Hx of macrocytic anemia. Followed by Dr Sanabria. Last rec was for BM biopsy . Pt deferred due to being asymptomatic  "and is monitoring. He denies night sweats, unexplained weight loss, easy bruising/bleeding, recurrent infection, bone pains, malaise.     He is no longer running 5 times a week and his stamina has decreased.     Pt is tolerating statin without frequent muscle cramps or diffuse myalgias or weakness.                    Review of Systems   Constitutional: Negative for activity change and unexpected weight change.   HENT: Negative for hearing loss, rhinorrhea and trouble swallowing.    Eyes: Negative for discharge and visual disturbance.   Respiratory: Negative for chest tightness and wheezing.    Cardiovascular: Negative for chest pain and palpitations.   Gastrointestinal: Negative for blood in stool, constipation, diarrhea and vomiting.   Endocrine: Negative for polydipsia and polyuria.   Genitourinary: Negative for difficulty urinating, hematuria and urgency.   Musculoskeletal: Negative for arthralgias, joint swelling and neck pain.   Neurological: Negative for weakness and headaches.   Psychiatric/Behavioral: Negative for confusion and dysphoric mood.       Objective:      Vitals:    02/27/19 0831   BP: (!) 118/22   Pulse: 63   SpO2: 98%   Weight: 64.3 kg (141 lb 12.1 oz)   Height: 5' 6" (1.676 m)      Physical Exam   Constitutional: He is oriented to person, place, and time. He appears well-developed and well-nourished. No distress.   HENT:   Head: Normocephalic and atraumatic.   Mouth/Throat: Oropharynx is clear and moist. No oropharyngeal exudate.   Eyes: Conjunctivae and EOM are normal. Pupils are equal, round, and reactive to light. No scleral icterus.   Neck: No thyromegaly present.   Cardiovascular: Normal rate, regular rhythm and normal heart sounds.   No murmur heard.  Pulmonary/Chest: Effort normal and breath sounds normal. He has no wheezes. He has no rales.   Abdominal: Soft. He exhibits no distension. There is no tenderness.   Musculoskeletal: He exhibits no edema or tenderness.   Lymphadenopathy:     " "He has no cervical adenopathy.   Neurological: He is alert and oriented to person, place, and time.   Skin: Skin is warm and dry.   Psychiatric: He has a normal mood and affect. His behavior is normal.       Assessment:       1. Anemia, unspecified type    2. Hypertriglyceridemia    3. BROOKE (dyspnea on exertion)    4. Disturbance of skin sensation         Plan:       Rico was seen today for follow-up.    Diagnoses and all orders for this visit:    Anemia, unspecified type  -     CBC auto differential; Future  -     Vitamin B12; Future  -     Reticulocytes; Future    Hypertriglyceridemia  -     Lipid panel; Future    BROOKE (dyspnea on exertion)  -     SCHEDULED EKG 12-LEAD (to [place]); Future    RTC in 6 months or sooner prn                    Side effects of medication(s) were discussed in detail and patient voiced understanding.  Patient will call back for any issues or complications.    Objective:      Vitals:    08/19/20 0930   BP: 134/60   Pulse: 67   SpO2: 98%   Weight: 62.6 kg (138 lb 0.1 oz)   Height: 5' 6" (1.676 m)      Physical Exam   Constitutional: He is oriented to person, place, and time. He appears well-developed and well-nourished. No distress.   HENT:   Head: Normocephalic and atraumatic.   Mouth/Throat: Oropharynx is clear and moist. No oropharyngeal exudate.   Eyes: Pupils are equal, round, and reactive to light. Conjunctivae and EOM are normal. No scleral icterus.   Neck: No thyromegaly present.   Cardiovascular: Normal rate, regular rhythm and normal heart sounds.   No murmur heard.  Pulmonary/Chest: Effort normal and breath sounds normal. He has no wheezes. He has no rales.   Abdominal: Soft. He exhibits no distension. There is no tenderness.   Musculoskeletal: He exhibits no edema or tenderness.   Lymphadenopathy:        Head (right side): No submental, no submandibular, no tonsillar, no preauricular and no posterior auricular adenopathy present.        Head (left side): No submental, no " submandibular, no tonsillar, no preauricular, no posterior auricular and no occipital adenopathy present.     He has no cervical adenopathy.        Right cervical: No superficial cervical adenopathy present.       Left cervical: No superficial cervical adenopathy present.        Right: No inguinal and no supraclavicular adenopathy present.        Left: No inguinal and no supraclavicular adenopathy present.   Neurological: He is alert and oriented to person, place, and time.   Skin: Skin is warm and dry.   Psychiatric: He has a normal mood and affect. His behavior is normal.       Assessment:       1. Prostate cancer screening    2. Annual physical exam    3. Abnormal finding of blood chemistry, unspecified     4. Changes in skin texture     5. Anemia, unspecified type    6. Anesthesia of skin         Plan:       Rico was seen today for follow-up.    Diagnoses and all orders for this visit:    Annual physical exam    Anemia, unspecified type  -     Lipid panel; Future  -     TSH; Future  -     CBC auto differential; Future  -     Reticulocytes; Future  -     Ferritin; Future  -     Iron and TIBC; Future  -     Vitamin B12; Future    Benign essential HTN   controlled. Continue current regimen    Anesthesia of skin   -     Vitamin B12; Future       RTC in 6 months or sooner felician       Osmar Pfeiffer MD  Internal Medicine-[facility]        Side effects of medication(s) were discussed in detail and patient voiced understanding.  Patient will call back for any issues or complications.     Review of Systems   Constitutional: Negative for appetite change, chills, fever and unexpected weight change.   HENT: Negative for hearing loss, sore throat and trouble swallowing.    Eyes: Negative for visual disturbance.   Respiratory: Negative for cough, chest tightness and shortness of breath.    Cardiovascular: Negative for chest pain and leg swelling.   Gastrointestinal: Negative for abdominal pain, blood in stool,  "constipation, diarrhea, nausea and vomiting.   Endocrine: Negative for polydipsia and polyuria.   Genitourinary: Negative for decreased urine volume, difficulty urinating, dysuria, frequency and urgency.   Musculoskeletal: Negative for gait problem.   Skin: Negative for rash.   Neurological: Negative for dizziness and numbness.   Psychiatric/Behavioral: The patient is not nervous/anxious.        Objective:      Vitals:    08/19/20 0930   BP: 134/60   Pulse: 67   SpO2: 98%   Weight: 62.6 kg (138 lb 0.1 oz)   Height: 5' 6" (1.676 m)      Physical Exam  Constitutional:       General: He is not in acute distress.     Appearance: He is well-developed.   HENT:      Head: Normocephalic and atraumatic.      Mouth/Throat:      Pharynx: No oropharyngeal exudate.   Eyes:      General: No scleral icterus.     Conjunctiva/sclera: Conjunctivae normal.      Pupils: Pupils are equal, round, and reactive to light.   Neck:      Thyroid: No thyromegaly.   Cardiovascular:      Rate and Rhythm: Normal rate and regular rhythm.      Heart sounds: Normal heart sounds. No murmur.   Pulmonary:      Effort: Pulmonary effort is normal.      Breath sounds: Normal breath sounds. No wheezing or rales.   Abdominal:      General: There is no distension.      Palpations: Abdomen is soft.      Tenderness: There is no abdominal tenderness.   Musculoskeletal:         General: No tenderness.   Lymphadenopathy:      Cervical: No cervical adenopathy.   Skin:     General: Skin is warm and dry.   Neurological:      Mental Status: He is alert and oriented to person, place, and time.   Psychiatric:         Behavior: Behavior normal.         Assessment:       1. Prostate cancer screening    2. Annual physical exam        Plan:       Diagnoses and all orders for this visit:    Prostate cancer screening    Annual physical exam           Osmar Pfeiffer MD  Internal Medicine-[facility]        Side effects of medication(s) were discussed in detail and " patient voiced understanding.  Patient will call back for any issues or complications.

## 2020-08-20 LAB
CHOLEST SERPL-MCNC: 138 MG/DL (ref 120–199)
CHOLEST/HDLC SERPL: 2.1 {RATIO} (ref 2–5)
ESTIMATED AVG GLUCOSE: 105 MG/DL (ref 68–131)
FERRITIN SERPL-MCNC: 521 NG/ML (ref 20–300)
HBA1C MFR BLD HPLC: 5.3 % (ref 4–5.6)
HDLC SERPL-MCNC: 67 MG/DL (ref 40–75)
HDLC SERPL: 48.6 % (ref 20–50)
IRON SERPL-MCNC: 80 UG/DL (ref 45–160)
LDLC SERPL CALC-MCNC: 50.8 MG/DL (ref 63–159)
NONHDLC SERPL-MCNC: 71 MG/DL
SATURATED IRON: 25 % (ref 20–50)
TOTAL IRON BINDING CAPACITY: 318 UG/DL (ref 250–450)
TRANSFERRIN SERPL-MCNC: 215 MG/DL (ref 200–375)
TRIGL SERPL-MCNC: 101 MG/DL (ref 30–150)
VIT B12 SERPL-MCNC: 383 PG/ML (ref 210–950)

## 2020-11-30 ENCOUNTER — PATIENT MESSAGE (OUTPATIENT)
Dept: INTERNAL MEDICINE | Facility: CLINIC | Age: 75
End: 2020-11-30

## 2020-11-30 DIAGNOSIS — R05.9 COUGH: Primary | ICD-10-CM

## 2020-12-01 ENCOUNTER — PATIENT MESSAGE (OUTPATIENT)
Dept: INTERNAL MEDICINE | Facility: CLINIC | Age: 75
End: 2020-12-01

## 2020-12-01 ENCOUNTER — OFFICE VISIT (OUTPATIENT)
Dept: INTERNAL MEDICINE | Facility: CLINIC | Age: 75
End: 2020-12-01
Payer: MEDICARE

## 2020-12-01 DIAGNOSIS — Z20.822 EXPOSURE TO COVID-19 VIRUS: Primary | ICD-10-CM

## 2020-12-01 PROCEDURE — 99213 OFFICE O/P EST LOW 20 MIN: CPT | Mod: 95,,, | Performed by: INTERNAL MEDICINE

## 2020-12-01 PROCEDURE — 99213 PR OFFICE/OUTPT VISIT, EST, LEVL III, 20-29 MIN: ICD-10-PCS | Mod: 95,,, | Performed by: INTERNAL MEDICINE

## 2020-12-01 PROCEDURE — 1159F MED LIST DOCD IN RCRD: CPT | Mod: 95,,, | Performed by: INTERNAL MEDICINE

## 2020-12-01 PROCEDURE — 1159F PR MEDICATION LIST DOCUMENTED IN MEDICAL RECORD: ICD-10-PCS | Mod: 95,,, | Performed by: INTERNAL MEDICINE

## 2020-12-01 NOTE — PROGRESS NOTES
INTERNAL MEDICINE CLINIC  TELEMEDICINE ENCOUNTER  Progress Note     PRESENTING HISTORY     PCP: Osmar Stokes MD    Current Chief Complaint/Problem:  COVID Exposure    The patient location is: Home  Visit type: Virtual visit with synchronous audio and video    History of Present Illness & ROS: Mr. Rico Park Jr. is a 75 y.o. male.    Was exposed to neighbor Thanksgiving afternoon.  He was 6 feet from her for 15 min. No mask.  She had no symptoms.  Later tested positive for COVID.    Currently no symptoms.    Review of Systems:  Answers for HPI/ROS submitted by the patient on 11/30/2020   activity change: No  unexpected weight change: No  neck pain: No  hearing loss: No  rhinorrhea: No  trouble swallowing: No  eye discharge: No  visual disturbance: No  chest tightness: No  wheezing: No  chest pain: No  palpitations: No  blood in stool: No  constipation: No  vomiting: No  diarrhea: No  polydipsia: No  polyuria: No  difficulty urinating: No  urgency: No  hematuria: No  joint swelling: No  arthralgias: No  headaches: No  weakness: No  confusion: No  dysphoric mood: No      PAST HISTORY:     Past Medical History:   Diagnosis Date    Cataract        Past Surgical History:   Procedure Laterality Date    CATARACT EXTRACTION      TONSILLECTOMY, ADENOIDECTOMY         Family History   Problem Relation Age of Onset    Heart disease Father     Diabetes Father     Diabetes Mother        Social History     Socioeconomic History    Marital status:      Spouse name: Not on file    Number of children: Not on file    Years of education: Not on file    Highest education level: Not on file   Occupational History    Not on file   Social Needs    Financial resource strain: Not hard at all    Food insecurity     Worry: Never true     Inability: Never true    Transportation needs     Medical: No     Non-medical: No   Tobacco Use    Smoking status: Never Smoker    Smokeless tobacco: Never Used   Substance  and Sexual Activity    Alcohol use: Yes     Frequency: 4 or more times a week     Drinks per session: 1 or 2     Binge frequency: Never     Comment: wine every other night    Drug use: Not on file    Sexual activity: Not on file   Lifestyle    Physical activity     Days per week: 3 days     Minutes per session: 30 min    Stress: Not at all   Relationships    Social connections     Talks on phone: Once a week     Gets together: Once a week     Attends [employer] service: Not on file     Active member of club or organization: No     Attends meetings of clubs or organizations: 1 to 4 times per year     Relationship status:    Other Topics Concern    Not on file   Social History Narrative    Not on file       MEDICATIONS & ALLERGIES:     Current Outpatient Medications on File Prior to Visit   Medication Sig Dispense Refill    allopurinoL (ZYLOPRIM) 300 MG tablet TAKE ONE TABLET BY MOUTH ONE TIME DAILY  90 tablet 3    amLODIPine (NORVASC) 5 MG tablet TAKE 1 TABLET BY MOUTH ONCE DAILY 90 tablet 0    atorvastatin (LIPITOR) 40 MG tablet TAKE ONE TABLET BY MOUTH ONE TIME DAILY  90 tablet 3    diphth,pertus,acell,,tetanus (BOOSTRIX) 2.5-8-5 Lf-mcg-Lf/0.5mL Syrg injection Inject into the muscle. 0.5 mL 0    gabapentin (NEURONTIN) 300 MG capsule TAKE ONE CAPSULE BY MOUTH THREE TIMES DAILY  90 capsule 0    ibuprofen (ADVIL,MOTRIN) 800 MG tablet Take 1 tablet (800 mg total) by mouth every 8 (eight) hours. 42 tablet 0    losartan (COZAAR) 100 MG tablet TAKE 1 TABLET BY MOUTH ONCE DAILY 90 tablet 3    pneumoc 13-zahida conj-dip cr,PF, 0.5 mL Syrg Inject into the muscle. 0.5 mL 0     No current facility-administered medications on file prior to visit.         Review of patient's allergies indicates:   Allergen Reactions    Pcn [penicillins] Rash       Medications Reconciliation:   I have reconciled the patient's home medications and discharge medications with the patient/family. I have updated all changes.  Refer  to After-Visit Medication List.    OBJECTIVE:     Previous Weight and BMI:  Wt Readings from Last 1 Encounters:   08/19/20 0930 62.6 kg (138 lb 0.1 oz)     There is no height or weight on file to calculate BMI.     Physical Exam per Video and/or Audio:  Voice: Normal speech.  General: Well developed, well nourished. No distress.  HEENT: Face is symmetric.   Psychiatric: Normal affect. Alert.    Laboratory  Lab Results   Component Value Date    WBC 9.47 08/19/2020    HGB 10.8 (L) 08/19/2020    HCT 33.1 (L) 08/19/2020     08/19/2020    CHOL 138 08/19/2020    TRIG 101 08/19/2020    HDL 67 08/19/2020    ALT 23 08/19/2020    AST 35 08/19/2020     08/19/2020    K 4.2 08/19/2020     08/19/2020    CREATININE 1.0 08/19/2020    BUN 15 08/19/2020    CO2 21 (L) 08/19/2020    TSH 3.133 08/19/2020    PSA 2.4 09/24/2019    HGBA1C 5.3 08/19/2020       ASSESSMENT & PLAN:     Exposure to COVID-19 virus  - Discussed COVID preventive measures. Instructions sent to his [facility] account.    Risk is low due to outdoor and distance.    No need to get tested unless symptoms within 14 days.     Total time spent with patient: 10 min.  Each patient to whom he or she provides medical services by telemedicine is:  (1) informed of the relationship between the physician and patient and the respective role of any other health care provider with respect to management of the patient; and (2) notified that he or she may decline to receive medical services by telemedicine and may withdraw from such care at any time.    After Visit Medication List :     Medication List          Accurate as of December 1, 2020  8:28 AM. If you have any questions, ask your nurse or doctor.            CONTINUE taking these medications    allopurinoL 300 MG tablet  Commonly known as: ZYLOPRIM  TAKE ONE TABLET BY MOUTH ONE TIME DAILY     amLODIPine 5 MG tablet  Commonly known as: NORVASC  TAKE 1 TABLET BY MOUTH ONCE DAILY     atorvastatin 40 MG  tablet  Commonly known as: LIPITOR  TAKE ONE TABLET BY MOUTH ONE TIME DAILY     BOOSTRIX TDAP 2.5-8-5 Lf-mcg-Lf/0.5mL Syrg injection  Generic drug: diphth,pertus(acell),tetanus  Inject into the muscle.     gabapentin 300 MG capsule  Commonly known as: NEURONTIN  TAKE ONE CAPSULE BY MOUTH THREE TIMES DAILY     ibuprofen 800 MG tablet  Commonly known as: ADVIL,MOTRIN  Take 1 tablet (800 mg total) by mouth every 8 (eight) hours.     losartan 100 MG tablet  Commonly known as: COZAAR  TAKE 1 TABLET BY MOUTH ONCE DAILY     PREVNAR 13 (PF) 0.5 mL Syrg  Generic drug: pneumoc 13-zahida conj-dip cr(PF)  Inject into the muscle.            Signing Physician:  Abraham Coyle MD

## 2020-12-01 NOTE — TELEPHONE ENCOUNTER
Please arrange covid screening today (drive thru ok) and let pt know I am ordering monitoring program. If COVID + will change to surveillance program and talk to him and explain difference and gameplan

## 2021-01-07 ENCOUNTER — IMMUNIZATION (OUTPATIENT)
Dept: INTERNAL MEDICINE | Facility: CLINIC | Age: 76
End: 2021-01-07
Payer: MEDICARE

## 2021-01-07 DIAGNOSIS — Z23 NEED FOR VACCINATION: ICD-10-CM

## 2021-01-07 PROCEDURE — 91300 COVID-19, MRNA, LNP-S, PF, 30 MCG/0.3 ML DOSE VACCINE: CPT | Mod: PBBFAC | Performed by: INTERNAL MEDICINE

## 2021-01-29 ENCOUNTER — IMMUNIZATION (OUTPATIENT)
Dept: INTERNAL MEDICINE | Facility: CLINIC | Age: 76
End: 2021-01-29
Payer: MEDICARE

## 2021-01-29 DIAGNOSIS — Z23 NEED FOR VACCINATION: Primary | ICD-10-CM

## 2021-01-29 PROCEDURE — 91300 COVID-19, MRNA, LNP-S, PF, 30 MCG/0.3 ML DOSE VACCINE: CPT | Mod: PBBFAC | Performed by: INTERNAL MEDICINE

## 2021-01-29 PROCEDURE — 0002A COVID-19, MRNA, LNP-S, PF, 30 MCG/0.3 ML DOSE VACCINE: CPT | Mod: PBBFAC | Performed by: INTERNAL MEDICINE

## 2021-03-18 ENCOUNTER — RESEARCH ENCOUNTER (OUTPATIENT)
Dept: RESEARCH | Facility: HOSPITAL | Age: 76
End: 2021-03-18

## 2021-03-18 ENCOUNTER — PATIENT MESSAGE (OUTPATIENT)
Dept: RESEARCH | Facility: HOSPITAL | Age: 76
End: 2021-03-18

## 2021-03-22 ENCOUNTER — RESEARCH ENCOUNTER (OUTPATIENT)
Dept: RESEARCH | Facility: HOSPITAL | Age: 76
End: 2021-03-22

## 2021-03-26 ENCOUNTER — PATIENT MESSAGE (OUTPATIENT)
Dept: RESEARCH | Facility: HOSPITAL | Age: 76
End: 2021-03-26

## 2021-04-15 ENCOUNTER — OFFICE VISIT (OUTPATIENT)
Dept: INTERNAL MEDICINE | Facility: CLINIC | Age: 76
End: 2021-04-15
Attending: INTERNAL MEDICINE
Payer: MEDICARE

## 2021-04-15 ENCOUNTER — LAB VISIT (OUTPATIENT)
Dept: LAB | Facility: OTHER | Age: 76
End: 2021-04-15
Attending: INTERNAL MEDICINE
Payer: MEDICARE

## 2021-04-15 VITALS
SYSTOLIC BLOOD PRESSURE: 112 MMHG | HEART RATE: 63 BPM | BODY MASS INDEX: 22.96 KG/M2 | OXYGEN SATURATION: 97 % | DIASTOLIC BLOOD PRESSURE: 56 MMHG | HEIGHT: 66 IN | WEIGHT: 142.88 LBS

## 2021-04-15 DIAGNOSIS — R79.9 ABNORMAL FINDING OF BLOOD CHEMISTRY, UNSPECIFIED: ICD-10-CM

## 2021-04-15 DIAGNOSIS — Z12.11 COLON CANCER SCREENING: Primary | ICD-10-CM

## 2021-04-15 DIAGNOSIS — D64.9 ANEMIA, UNSPECIFIED TYPE: ICD-10-CM

## 2021-04-15 DIAGNOSIS — R20.8 OTHER DISTURBANCES OF SKIN SENSATION (CODE): ICD-10-CM

## 2021-04-15 DIAGNOSIS — I10 BENIGN ESSENTIAL HTN: ICD-10-CM

## 2021-04-15 DIAGNOSIS — R79.9 ABNORMAL FINDING OF BLOOD CHEMISTRY: ICD-10-CM

## 2021-04-15 LAB
ALBUMIN SERPL BCP-MCNC: 3.7 G/DL (ref 3.5–5.2)
ALP SERPL-CCNC: 86 U/L (ref 55–135)
ALT SERPL W/O P-5'-P-CCNC: 20 U/L (ref 10–44)
ANION GAP SERPL CALC-SCNC: 12 MMOL/L (ref 8–16)
AST SERPL-CCNC: 30 U/L (ref 10–40)
BASOPHILS # BLD AUTO: 0.06 K/UL (ref 0–0.2)
BASOPHILS NFR BLD: 0.4 % (ref 0–1.9)
BILIRUB SERPL-MCNC: 0.6 MG/DL (ref 0.1–1)
BUN SERPL-MCNC: 17 MG/DL (ref 8–23)
CALCIUM SERPL-MCNC: 8.9 MG/DL (ref 8.7–10.5)
CHLORIDE SERPL-SCNC: 103 MMOL/L (ref 95–110)
CO2 SERPL-SCNC: 23 MMOL/L (ref 23–29)
CREAT SERPL-MCNC: 0.9 MG/DL (ref 0.5–1.4)
DIFFERENTIAL METHOD: ABNORMAL
EOSINOPHIL # BLD AUTO: 0.3 K/UL (ref 0–0.5)
EOSINOPHIL NFR BLD: 1.9 % (ref 0–8)
ERYTHROCYTE [DISTWIDTH] IN BLOOD BY AUTOMATED COUNT: 12.6 % (ref 11.5–14.5)
EST. GFR  (AFRICAN AMERICAN): >60 ML/MIN/1.73 M^2
EST. GFR  (NON AFRICAN AMERICAN): >60 ML/MIN/1.73 M^2
ESTIMATED AVG GLUCOSE: 108 MG/DL (ref 68–131)
FERRITIN SERPL-MCNC: 589 NG/ML (ref 20–300)
GLUCOSE SERPL-MCNC: 100 MG/DL (ref 70–110)
HBA1C MFR BLD: 5.4 % (ref 4–5.6)
HCT VFR BLD AUTO: 31.9 % (ref 40–54)
HGB BLD-MCNC: 10.5 G/DL (ref 14–18)
IMM GRANULOCYTES # BLD AUTO: 0.05 K/UL (ref 0–0.04)
IMM GRANULOCYTES NFR BLD AUTO: 0.4 % (ref 0–0.5)
IRON SERPL-MCNC: 139 UG/DL (ref 45–160)
LYMPHOCYTES # BLD AUTO: 4 K/UL (ref 1–4.8)
LYMPHOCYTES NFR BLD: 28.6 % (ref 18–48)
MCH RBC QN AUTO: 32.4 PG (ref 27–31)
MCHC RBC AUTO-ENTMCNC: 32.9 G/DL (ref 32–36)
MCV RBC AUTO: 99 FL (ref 82–98)
MONOCYTES # BLD AUTO: 0.8 K/UL (ref 0.3–1)
MONOCYTES NFR BLD: 5.9 % (ref 4–15)
NEUTROPHILS # BLD AUTO: 8.8 K/UL (ref 1.8–7.7)
NEUTROPHILS NFR BLD: 62.8 % (ref 38–73)
NRBC BLD-RTO: 0 /100 WBC
PLATELET # BLD AUTO: 253 K/UL (ref 150–450)
PMV BLD AUTO: 10.4 FL (ref 9.2–12.9)
POTASSIUM SERPL-SCNC: 4 MMOL/L (ref 3.5–5.1)
PROT SERPL-MCNC: 6.9 G/DL (ref 6–8.4)
RBC # BLD AUTO: 3.24 M/UL (ref 4.6–6.2)
SATURATED IRON: 46 % (ref 20–50)
SODIUM SERPL-SCNC: 138 MMOL/L (ref 136–145)
TOTAL IRON BINDING CAPACITY: 300 UG/DL (ref 250–450)
TRANSFERRIN SERPL-MCNC: 203 MG/DL (ref 200–375)
TSH SERPL DL<=0.005 MIU/L-ACNC: 3.13 UIU/ML (ref 0.4–4)
VIT B12 SERPL-MCNC: 491 PG/ML (ref 210–950)
WBC # BLD AUTO: 13.93 K/UL (ref 3.9–12.7)

## 2021-04-15 PROCEDURE — 1159F MED LIST DOCD IN RCRD: CPT | Mod: S$GLB,,, | Performed by: INTERNAL MEDICINE

## 2021-04-15 PROCEDURE — 85025 COMPLETE CBC W/AUTO DIFF WBC: CPT | Performed by: INTERNAL MEDICINE

## 2021-04-15 PROCEDURE — 82607 VITAMIN B-12: CPT | Performed by: INTERNAL MEDICINE

## 2021-04-15 PROCEDURE — 36415 COLL VENOUS BLD VENIPUNCTURE: CPT | Performed by: INTERNAL MEDICINE

## 2021-04-15 PROCEDURE — 1125F PR PAIN SEVERITY QUANTIFIED, PAIN PRESENT: ICD-10-PCS | Mod: S$GLB,,, | Performed by: INTERNAL MEDICINE

## 2021-04-15 PROCEDURE — 84443 ASSAY THYROID STIM HORMONE: CPT | Performed by: INTERNAL MEDICINE

## 2021-04-15 PROCEDURE — 99214 OFFICE O/P EST MOD 30 MIN: CPT | Mod: S$GLB,,, | Performed by: INTERNAL MEDICINE

## 2021-04-15 PROCEDURE — 83036 HEMOGLOBIN GLYCOSYLATED A1C: CPT | Performed by: INTERNAL MEDICINE

## 2021-04-15 PROCEDURE — 80053 COMPREHEN METABOLIC PANEL: CPT | Performed by: INTERNAL MEDICINE

## 2021-04-15 PROCEDURE — 99999 PR PBB SHADOW E&M-EST. PATIENT-LVL IV: ICD-10-PCS | Mod: PBBFAC,,, | Performed by: INTERNAL MEDICINE

## 2021-04-15 PROCEDURE — 99214 PR OFFICE/OUTPT VISIT, EST, LEVL IV, 30-39 MIN: ICD-10-PCS | Mod: S$GLB,,, | Performed by: INTERNAL MEDICINE

## 2021-04-15 PROCEDURE — 3008F BODY MASS INDEX DOCD: CPT | Mod: CPTII,S$GLB,, | Performed by: INTERNAL MEDICINE

## 2021-04-15 PROCEDURE — 3008F PR BODY MASS INDEX (BMI) DOCUMENTED: ICD-10-PCS | Mod: CPTII,S$GLB,, | Performed by: INTERNAL MEDICINE

## 2021-04-15 PROCEDURE — 1159F PR MEDICATION LIST DOCUMENTED IN MEDICAL RECORD: ICD-10-PCS | Mod: S$GLB,,, | Performed by: INTERNAL MEDICINE

## 2021-04-15 PROCEDURE — 1125F AMNT PAIN NOTED PAIN PRSNT: CPT | Mod: S$GLB,,, | Performed by: INTERNAL MEDICINE

## 2021-04-15 PROCEDURE — 99999 PR PBB SHADOW E&M-EST. PATIENT-LVL IV: CPT | Mod: PBBFAC,,, | Performed by: INTERNAL MEDICINE

## 2021-04-15 PROCEDURE — 82728 ASSAY OF FERRITIN: CPT | Performed by: INTERNAL MEDICINE

## 2021-04-15 PROCEDURE — 83540 ASSAY OF IRON: CPT | Performed by: INTERNAL MEDICINE

## 2021-04-17 ENCOUNTER — PATIENT MESSAGE (OUTPATIENT)
Dept: INTERNAL MEDICINE | Facility: CLINIC | Age: 76
End: 2021-04-17

## 2021-09-01 NOTE — PROGRESS NOTES
"Subjective:       Patient ID: Rico Park Jr. is a 73 y.o. male.    Chief Complaint: Annual Exam    Here for annual exam    Slight sensation to right thigh last week and is going to restarts Neurontin. Before this symptoms have been mild, intermittent and non debilitating. Last rec was for MRI if pain cotnunes he is not interested in surgery nor minimally invasive procedures at this time so we will continue to postpone MRI for now. Patient denies weakness of LE, saddle anesthesia, bowel/bladder incontinence, or F/C.     Hx of macrocytic anemia. Followed by Dr Sanabria. Last rec was for BM biopsy . Pt deferred due to being asymptomatic and is monitoring. He denies night sweats, unexplained weight loss, easy bruising/bleeding, recurrent infection, bone pains, malaise.                 Review of Systems   Constitutional: Negative for appetite change, chills, fever and unexpected weight change.   HENT: Negative for hearing loss, sore throat and trouble swallowing.    Eyes: Negative for visual disturbance.   Respiratory: Negative for cough, chest tightness and shortness of breath.    Cardiovascular: Negative for chest pain and leg swelling.   Gastrointestinal: Negative for abdominal pain, blood in stool, constipation, diarrhea, nausea and vomiting.   Endocrine: Negative for polydipsia and polyuria.   Genitourinary: Negative for decreased urine volume, difficulty urinating, dysuria, frequency and urgency.   Musculoskeletal: Negative for gait problem.   Skin: Negative for rash.   Neurological: Negative for dizziness and numbness.   Psychiatric/Behavioral: The patient is not nervous/anxious.        Objective:      Vitals:    09/05/18 0905   BP: 138/64   Pulse: 63   SpO2: 96%   Weight: 63.8 kg (140 lb 10.5 oz)   Height: 5' 6" (1.676 m)      Physical Exam   Constitutional: He is oriented to person, place, and time. He appears well-developed and well-nourished. No distress.   HENT:   Head: Normocephalic and atraumatic. " Discharged by Sergey Sutton, [address] [place], RN  09/01/21 6685   Mouth/Throat: Oropharynx is clear and moist. No oropharyngeal exudate.   Eyes: Conjunctivae and EOM are normal. Pupils are equal, round, and reactive to light. No scleral icterus.   Neck: No thyromegaly present.   Cardiovascular: Normal rate, regular rhythm and normal heart sounds.   No murmur heard.  Pulmonary/Chest: Effort normal and breath sounds normal. He has no wheezes. He has no rales.   Abdominal: Soft. He exhibits no distension. There is no tenderness.   Musculoskeletal: He exhibits no edema or tenderness.   Lymphadenopathy:     He has no cervical adenopathy.   Neurological: He is alert and oriented to person, place, and time.   Skin: Skin is warm and dry.   Psychiatric: He has a normal mood and affect. His behavior is normal.       Assessment:       1. Annual physical exam    2. Neuralgia of right thigh    3. Macrocytic anemia    4. Abnormal finding of blood chemistry     5. Disturbance of skin sensation        Plan:       Rico was seen today for annual exam.    Diagnoses and all orders for this visit:    Annual physical exam  -     Comprehensive metabolic panel; Future  -     Lipid panel; Future  -     TSH; Future  -     CBC auto differential; Future  -     Hemoglobin A1c; Future    Neuralgia of right thigh   F/u with back and spine if pains persist despite gabapentin.    Macrocytic anemia  Will update labs and send to oncology for review. F/u with Dr. Sanabria   -     Vitamin B12; Future  -     Methylmalonic acid, serum; Future  -     Pathologist Interpretation Differential; Future  -     CBC ONCOLOGY; Future  -     Reticulocytes; Future  -     gabapentin (NEURONTIN) 300 MG capsule; Take 1 capsule (300 mg total) by mouth 3 (three) times daily.    Abnormal finding of blood chemistry   -     Lipid panel; Future  -     Hemoglobin A1c; Future    Disturbance of skin sensation  -     TSH; Future    ALPESH  Update labs    RTC in 6 months or sooner prn              Side effects of medication(s) were discussed in  detail and patient voiced understanding.  Patient will call back for any issues or complications.

## 2021-09-29 ENCOUNTER — IMMUNIZATION (OUTPATIENT)
Dept: INTERNAL MEDICINE | Facility: CLINIC | Age: 76
End: 2021-09-29
Payer: MEDICARE

## 2021-09-29 DIAGNOSIS — Z23 NEED FOR VACCINATION: Primary | ICD-10-CM

## 2021-09-29 PROCEDURE — 91300 COVID-19, MRNA, LNP-S, PF, 30 MCG/0.3 ML DOSE VACCINE: CPT | Mod: PBBFAC | Performed by: INTERNAL MEDICINE

## 2021-09-29 PROCEDURE — 0003A COVID-19, MRNA, LNP-S, PF, 30 MCG/0.3 ML DOSE VACCINE: CPT | Mod: CV19,PBBFAC | Performed by: INTERNAL MEDICINE

## 2021-11-03 ENCOUNTER — OFFICE VISIT (OUTPATIENT)
Dept: INTERNAL MEDICINE | Facility: CLINIC | Age: 76
End: 2021-11-03
Attending: INTERNAL MEDICINE
Payer: MEDICARE

## 2021-11-03 ENCOUNTER — LAB VISIT (OUTPATIENT)
Dept: LAB | Facility: OTHER | Age: 76
End: 2021-11-03
Attending: INTERNAL MEDICINE
Payer: MEDICARE

## 2021-11-03 VITALS
BODY MASS INDEX: 21.97 KG/M2 | HEIGHT: 66 IN | DIASTOLIC BLOOD PRESSURE: 62 MMHG | OXYGEN SATURATION: 98 % | SYSTOLIC BLOOD PRESSURE: 120 MMHG | HEART RATE: 65 BPM | WEIGHT: 136.69 LBS

## 2021-11-03 DIAGNOSIS — Z12.11 COLON CANCER SCREENING: ICD-10-CM

## 2021-11-03 DIAGNOSIS — I10 BENIGN ESSENTIAL HTN: ICD-10-CM

## 2021-11-03 DIAGNOSIS — N40.0 BENIGN PROSTATIC HYPERPLASIA WITHOUT LOWER URINARY TRACT SYMPTOMS: Primary | ICD-10-CM

## 2021-11-03 DIAGNOSIS — D53.9 MACROCYTIC ANEMIA: ICD-10-CM

## 2021-11-03 DIAGNOSIS — M10.9 ACUTE GOUT OF FOOT, UNSPECIFIED CAUSE, UNSPECIFIED LATERALITY: ICD-10-CM

## 2021-11-03 LAB
ALBUMIN SERPL BCP-MCNC: 3.7 G/DL (ref 3.5–5.2)
ALP SERPL-CCNC: 75 U/L (ref 55–135)
ALT SERPL W/O P-5'-P-CCNC: 20 U/L (ref 10–44)
ANION GAP SERPL CALC-SCNC: 10 MMOL/L (ref 8–16)
AST SERPL-CCNC: 32 U/L (ref 10–40)
BASOPHILS # BLD AUTO: 0.06 K/UL (ref 0–0.2)
BASOPHILS NFR BLD: 0.6 % (ref 0–1.9)
BILIRUB SERPL-MCNC: 0.5 MG/DL (ref 0.1–1)
BUN SERPL-MCNC: 14 MG/DL (ref 8–23)
CALCIUM SERPL-MCNC: 9.3 MG/DL (ref 8.7–10.5)
CHLORIDE SERPL-SCNC: 107 MMOL/L (ref 95–110)
CHOLEST SERPL-MCNC: 173 MG/DL (ref 120–199)
CHOLEST/HDLC SERPL: 2.7 {RATIO} (ref 2–5)
CO2 SERPL-SCNC: 24 MMOL/L (ref 23–29)
CREAT SERPL-MCNC: 1 MG/DL (ref 0.5–1.4)
DIFFERENTIAL METHOD: ABNORMAL
EOSINOPHIL # BLD AUTO: 0.2 K/UL (ref 0–0.5)
EOSINOPHIL NFR BLD: 1.7 % (ref 0–8)
ERYTHROCYTE [DISTWIDTH] IN BLOOD BY AUTOMATED COUNT: 12.5 % (ref 11.5–14.5)
EST. GFR  (AFRICAN AMERICAN): >60 ML/MIN/1.73 M^2
EST. GFR  (NON AFRICAN AMERICAN): >60 ML/MIN/1.73 M^2
ESTIMATED AVG GLUCOSE: 105 MG/DL (ref 68–131)
FERRITIN SERPL-MCNC: 734 NG/ML (ref 20–300)
GLUCOSE SERPL-MCNC: 92 MG/DL (ref 70–110)
HBA1C MFR BLD: 5.3 % (ref 4–5.6)
HCT VFR BLD AUTO: 34 % (ref 40–54)
HDLC SERPL-MCNC: 64 MG/DL (ref 40–75)
HDLC SERPL: 37 % (ref 20–50)
HGB BLD-MCNC: 11 G/DL (ref 14–18)
IMM GRANULOCYTES # BLD AUTO: 0.03 K/UL (ref 0–0.04)
IMM GRANULOCYTES NFR BLD AUTO: 0.3 % (ref 0–0.5)
IRON SERPL-MCNC: 166 UG/DL (ref 45–160)
LDLC SERPL CALC-MCNC: 46 MG/DL (ref 63–159)
LYMPHOCYTES # BLD AUTO: 3.4 K/UL (ref 1–4.8)
LYMPHOCYTES NFR BLD: 33.2 % (ref 18–48)
MCH RBC QN AUTO: 32.7 PG (ref 27–31)
MCHC RBC AUTO-ENTMCNC: 32.4 G/DL (ref 32–36)
MCV RBC AUTO: 101 FL (ref 82–98)
MONOCYTES # BLD AUTO: 0.7 K/UL (ref 0.3–1)
MONOCYTES NFR BLD: 6.9 % (ref 4–15)
NEUTROPHILS # BLD AUTO: 5.8 K/UL (ref 1.8–7.7)
NEUTROPHILS NFR BLD: 57.3 % (ref 38–73)
NONHDLC SERPL-MCNC: 109 MG/DL
NRBC BLD-RTO: 0 /100 WBC
PLATELET # BLD AUTO: 260 K/UL (ref 150–450)
PMV BLD AUTO: 10.5 FL (ref 9.2–12.9)
POTASSIUM SERPL-SCNC: 4.3 MMOL/L (ref 3.5–5.1)
PROT SERPL-MCNC: 7.2 G/DL (ref 6–8.4)
RBC # BLD AUTO: 3.36 M/UL (ref 4.6–6.2)
SATURATED IRON: 52 % (ref 20–50)
SODIUM SERPL-SCNC: 141 MMOL/L (ref 136–145)
TOTAL IRON BINDING CAPACITY: 318 UG/DL (ref 250–450)
TRANSFERRIN SERPL-MCNC: 215 MG/DL (ref 200–375)
TRIGL SERPL-MCNC: 315 MG/DL (ref 30–150)
TSH SERPL DL<=0.005 MIU/L-ACNC: 1.75 UIU/ML (ref 0.4–4)
WBC # BLD AUTO: 10.09 K/UL (ref 3.9–12.7)

## 2021-11-03 PROCEDURE — 1101F PT FALLS ASSESS-DOCD LE1/YR: CPT | Mod: CPTII,S$GLB,, | Performed by: INTERNAL MEDICINE

## 2021-11-03 PROCEDURE — 3288F PR FALLS RISK ASSESSMENT DOCUMENTED: ICD-10-PCS | Mod: CPTII,S$GLB,, | Performed by: INTERNAL MEDICINE

## 2021-11-03 PROCEDURE — 99214 OFFICE O/P EST MOD 30 MIN: CPT | Mod: S$GLB,,, | Performed by: INTERNAL MEDICINE

## 2021-11-03 PROCEDURE — 1126F AMNT PAIN NOTED NONE PRSNT: CPT | Mod: CPTII,S$GLB,, | Performed by: INTERNAL MEDICINE

## 2021-11-03 PROCEDURE — 84443 ASSAY THYROID STIM HORMONE: CPT | Performed by: INTERNAL MEDICINE

## 2021-11-03 PROCEDURE — 3078F DIAST BP <80 MM HG: CPT | Mod: CPTII,S$GLB,, | Performed by: INTERNAL MEDICINE

## 2021-11-03 PROCEDURE — 1160F RVW MEDS BY RX/DR IN RCRD: CPT | Mod: CPTII,S$GLB,, | Performed by: INTERNAL MEDICINE

## 2021-11-03 PROCEDURE — 99999 PR PBB SHADOW E&M-EST. PATIENT-LVL III: ICD-10-PCS | Mod: PBBFAC,,, | Performed by: INTERNAL MEDICINE

## 2021-11-03 PROCEDURE — 1126F PR PAIN SEVERITY QUANTIFIED, NO PAIN PRESENT: ICD-10-PCS | Mod: CPTII,S$GLB,, | Performed by: INTERNAL MEDICINE

## 2021-11-03 PROCEDURE — 80053 COMPREHEN METABOLIC PANEL: CPT | Performed by: INTERNAL MEDICINE

## 2021-11-03 PROCEDURE — 85025 COMPLETE CBC W/AUTO DIFF WBC: CPT | Performed by: INTERNAL MEDICINE

## 2021-11-03 PROCEDURE — 3044F HG A1C LEVEL LT 7.0%: CPT | Mod: CPTII,S$GLB,, | Performed by: INTERNAL MEDICINE

## 2021-11-03 PROCEDURE — 36415 COLL VENOUS BLD VENIPUNCTURE: CPT | Performed by: INTERNAL MEDICINE

## 2021-11-03 PROCEDURE — 1159F PR MEDICATION LIST DOCUMENTED IN MEDICAL RECORD: ICD-10-PCS | Mod: CPTII,S$GLB,, | Performed by: INTERNAL MEDICINE

## 2021-11-03 PROCEDURE — 1160F PR REVIEW ALL MEDS BY PRESCRIBER/CLIN PHARMACIST DOCUMENTED: ICD-10-PCS | Mod: CPTII,S$GLB,, | Performed by: INTERNAL MEDICINE

## 2021-11-03 PROCEDURE — 99999 PR PBB SHADOW E&M-EST. PATIENT-LVL III: CPT | Mod: PBBFAC,,, | Performed by: INTERNAL MEDICINE

## 2021-11-03 PROCEDURE — 1101F PR PT FALLS ASSESS DOC 0-1 FALLS W/OUT INJ PAST YR: ICD-10-PCS | Mod: CPTII,S$GLB,, | Performed by: INTERNAL MEDICINE

## 2021-11-03 PROCEDURE — 3074F PR MOST RECENT SYSTOLIC BLOOD PRESSURE < 130 MM HG: ICD-10-PCS | Mod: CPTII,S$GLB,, | Performed by: INTERNAL MEDICINE

## 2021-11-03 PROCEDURE — 80061 LIPID PANEL: CPT | Performed by: INTERNAL MEDICINE

## 2021-11-03 PROCEDURE — 84466 ASSAY OF TRANSFERRIN: CPT | Performed by: INTERNAL MEDICINE

## 2021-11-03 PROCEDURE — 4010F PR ACE/ARB THEARPY RXD/TAKEN: ICD-10-PCS | Mod: CPTII,S$GLB,, | Performed by: INTERNAL MEDICINE

## 2021-11-03 PROCEDURE — 99214 PR OFFICE/OUTPT VISIT, EST, LEVL IV, 30-39 MIN: ICD-10-PCS | Mod: S$GLB,,, | Performed by: INTERNAL MEDICINE

## 2021-11-03 PROCEDURE — 4010F ACE/ARB THERAPY RXD/TAKEN: CPT | Mod: CPTII,S$GLB,, | Performed by: INTERNAL MEDICINE

## 2021-11-03 PROCEDURE — 3044F PR MOST RECENT HEMOGLOBIN A1C LEVEL <7.0%: ICD-10-PCS | Mod: CPTII,S$GLB,, | Performed by: INTERNAL MEDICINE

## 2021-11-03 PROCEDURE — 1159F MED LIST DOCD IN RCRD: CPT | Mod: CPTII,S$GLB,, | Performed by: INTERNAL MEDICINE

## 2021-11-03 PROCEDURE — 83036 HEMOGLOBIN GLYCOSYLATED A1C: CPT | Performed by: INTERNAL MEDICINE

## 2021-11-03 PROCEDURE — 3074F SYST BP LT 130 MM HG: CPT | Mod: CPTII,S$GLB,, | Performed by: INTERNAL MEDICINE

## 2021-11-03 PROCEDURE — 3078F PR MOST RECENT DIASTOLIC BLOOD PRESSURE < 80 MM HG: ICD-10-PCS | Mod: CPTII,S$GLB,, | Performed by: INTERNAL MEDICINE

## 2021-11-03 PROCEDURE — 82728 ASSAY OF FERRITIN: CPT | Performed by: INTERNAL MEDICINE

## 2021-11-03 PROCEDURE — 3288F FALL RISK ASSESSMENT DOCD: CPT | Mod: CPTII,S$GLB,, | Performed by: INTERNAL MEDICINE

## 2021-11-03 RX ORDER — LOSARTAN POTASSIUM 100 MG/1
100 TABLET ORAL DAILY
Qty: 90 TABLET | Refills: 3 | Status: SHIPPED | OUTPATIENT
Start: 2021-11-03 | End: 2022-09-29 | Stop reason: SDUPTHER

## 2021-11-03 RX ORDER — GABAPENTIN 300 MG/1
300 CAPSULE ORAL DAILY
Qty: 90 CAPSULE | Refills: 2 | Status: SHIPPED | OUTPATIENT
Start: 2021-11-03 | End: 2022-09-29 | Stop reason: SDUPTHER

## 2021-11-09 ENCOUNTER — TELEPHONE (OUTPATIENT)
Dept: INTERNAL MEDICINE | Facility: CLINIC | Age: 76
End: 2021-11-09
Payer: MEDICARE

## 2022-01-13 ENCOUNTER — PATIENT MESSAGE (OUTPATIENT)
Dept: INTERNAL MEDICINE | Facility: CLINIC | Age: 77
End: 2022-01-13
Payer: MEDICARE

## 2022-01-24 DIAGNOSIS — Z12.11 ENCOUNTER FOR SCREENING COLONOSCOPY: Primary | ICD-10-CM

## 2022-01-26 ENCOUNTER — PATIENT MESSAGE (OUTPATIENT)
Dept: ENDOSCOPY | Facility: HOSPITAL | Age: 77
End: 2022-01-26
Payer: MEDICARE

## 2022-01-26 ENCOUNTER — TELEPHONE (OUTPATIENT)
Dept: ENDOSCOPY | Facility: HOSPITAL | Age: 77
End: 2022-01-26
Payer: MEDICARE

## 2022-01-26 NOTE — TELEPHONE ENCOUNTER
Contacted Pt to schedule colonoscopy, Pt did  not answer, left message for  Pt to call back to schedule, number provided [phone].

## 2022-01-27 ENCOUNTER — PATIENT MESSAGE (OUTPATIENT)
Dept: ENDOSCOPY | Facility: HOSPITAL | Age: 77
End: 2022-01-27
Payer: MEDICARE

## 2022-01-27 DIAGNOSIS — Z12.11 SPECIAL SCREENING FOR MALIGNANT NEOPLASMS, COLON: Primary | ICD-10-CM

## 2022-01-27 DIAGNOSIS — Z01.818 PRE-OP TESTING: ICD-10-CM

## 2022-01-27 RX ORDER — POLYETHYLENE GLYCOL 3350, SODIUM SULFATE ANHYDROUS, SODIUM BICARBONATE, SODIUM CHLORIDE, POTASSIUM CHLORIDE 236; 22.74; 6.74; 5.86; 2.97 G/4L; G/4L; G/4L; G/4L; G/4L
4 POWDER, FOR SOLUTION ORAL ONCE
Qty: 4000 ML | Refills: 0 | Status: SHIPPED | OUTPATIENT
Start: 2022-01-27 | End: 2022-01-27

## 2022-02-20 ENCOUNTER — LAB VISIT (OUTPATIENT)
Dept: PRIMARY CARE CLINIC | Facility: CLINIC | Age: 77
End: 2022-02-20
Payer: MEDICARE

## 2022-02-20 DIAGNOSIS — Z01.818 PRE-OP TESTING: ICD-10-CM

## 2022-02-20 PROCEDURE — U0003 INFECTIOUS AGENT DETECTION BY NUCLEIC ACID (DNA OR RNA); SEVERE ACUTE RESPIRATORY SYNDROME CORONAVIRUS 2 (SARS-COV-2) (CORONAVIRUS DISEASE [COVID-19]), AMPLIFIED PROBE TECHNIQUE, MAKING USE OF HIGH THROUGHPUT TECHNOLOGIES AS DESCRIBED BY CMS-2020-01-R: HCPCS | Performed by: FAMILY MEDICINE

## 2022-02-20 PROCEDURE — U0005 INFEC AGEN DETEC AMPLI PROBE: HCPCS | Performed by: FAMILY MEDICINE

## 2022-02-21 LAB — SARS-COV-2 RNA RESP QL NAA+PROBE: NOT DETECTED

## 2022-02-23 ENCOUNTER — ANESTHESIA EVENT (OUTPATIENT)
Dept: ENDOSCOPY | Facility: HOSPITAL | Age: 77
End: 2022-02-23
Payer: MEDICARE

## 2022-02-23 ENCOUNTER — ANESTHESIA (OUTPATIENT)
Dept: ENDOSCOPY | Facility: HOSPITAL | Age: 77
End: 2022-02-23
Payer: MEDICARE

## 2022-02-23 ENCOUNTER — HOSPITAL ENCOUNTER (OUTPATIENT)
Facility: HOSPITAL | Age: 77
Discharge: HOME | End: 2022-02-23
Attending: STUDENT IN AN ORGANIZED HEALTH CARE EDUCATION/TRAINING PROGRAM | Admitting: STUDENT IN AN ORGANIZED HEALTH CARE EDUCATION/TRAINING PROGRAM
Payer: MEDICARE

## 2022-02-23 VITALS
RESPIRATION RATE: 14 BRPM | OXYGEN SATURATION: 99 % | DIASTOLIC BLOOD PRESSURE: 67 MMHG | TEMPERATURE: 98 F | HEART RATE: 52 BPM | SYSTOLIC BLOOD PRESSURE: 132 MMHG

## 2022-02-23 DIAGNOSIS — Z12.11 ENCOUNTER FOR SCREENING COLONOSCOPY: Primary | ICD-10-CM

## 2022-02-23 PROCEDURE — G0105 COLORECTAL SCRN; HI RISK IND: HCPCS | Performed by: STUDENT IN AN ORGANIZED HEALTH CARE EDUCATION/TRAINING PROGRAM

## 2022-02-23 PROCEDURE — G0105 COLORECTAL SCRN; HI RISK IND: ICD-10-PCS | Mod: ,,, | Performed by: STUDENT IN AN ORGANIZED HEALTH CARE EDUCATION/TRAINING PROGRAM

## 2022-02-23 PROCEDURE — 37000008 HC ANESTHESIA 1ST 15 MINUTES: Performed by: STUDENT IN AN ORGANIZED HEALTH CARE EDUCATION/TRAINING PROGRAM

## 2022-02-23 PROCEDURE — E9220 PRA ENDO ANESTHESIA: HCPCS | Mod: ,,, | Performed by: NURSE ANESTHETIST, CERTIFIED REGISTERED

## 2022-02-23 PROCEDURE — 25000003 PHARM REV CODE 250: Performed by: STUDENT IN AN ORGANIZED HEALTH CARE EDUCATION/TRAINING PROGRAM

## 2022-02-23 PROCEDURE — E9220 PRA ENDO ANESTHESIA: ICD-10-PCS | Mod: ,,, | Performed by: NURSE ANESTHETIST, CERTIFIED REGISTERED

## 2022-02-23 PROCEDURE — 37000009 HC ANESTHESIA EA ADD 15 MINS: Performed by: STUDENT IN AN ORGANIZED HEALTH CARE EDUCATION/TRAINING PROGRAM

## 2022-02-23 PROCEDURE — G0105 COLORECTAL SCRN; HI RISK IND: HCPCS | Mod: ,,, | Performed by: STUDENT IN AN ORGANIZED HEALTH CARE EDUCATION/TRAINING PROGRAM

## 2022-02-23 PROCEDURE — 63600175 PHARM REV CODE 636 W HCPCS: Performed by: NURSE ANESTHETIST, CERTIFIED REGISTERED

## 2022-02-23 PROCEDURE — 25000003 PHARM REV CODE 250: Performed by: NURSE ANESTHETIST, CERTIFIED REGISTERED

## 2022-02-23 RX ORDER — LIDOCAINE HYDROCHLORIDE 20 MG/ML
INJECTION INTRAVENOUS
Status: DISCONTINUED | OUTPATIENT
Start: 2022-02-23 | End: 2022-02-23

## 2022-02-23 RX ORDER — PROPOFOL 10 MG/ML
VIAL (ML) INTRAVENOUS CONTINUOUS PRN
Status: DISCONTINUED | OUTPATIENT
Start: 2022-02-23 | End: 2022-02-23

## 2022-02-23 RX ORDER — SODIUM CHLORIDE 9 MG/ML
INJECTION, SOLUTION INTRAVENOUS CONTINUOUS
Status: DISCONTINUED | OUTPATIENT
Start: 2022-02-23 | End: 2022-02-23 | Stop reason: HOSPADM

## 2022-02-23 RX ORDER — PROPOFOL 10 MG/ML
VIAL (ML) INTRAVENOUS
Status: DISCONTINUED | OUTPATIENT
Start: 2022-02-23 | End: 2022-02-23

## 2022-02-23 RX ADMIN — PROPOFOL 30 MG: 10 INJECTION, EMULSION INTRAVENOUS at 01:02

## 2022-02-23 RX ADMIN — SODIUM CHLORIDE: 0.9 INJECTION, SOLUTION INTRAVENOUS at 12:02

## 2022-02-23 RX ADMIN — PROPOFOL 40 MG: 10 INJECTION, EMULSION INTRAVENOUS at 01:02

## 2022-02-23 RX ADMIN — Medication 100 MCG/KG/MIN: at 01:02

## 2022-02-23 RX ADMIN — LIDOCAINE HYDROCHLORIDE 60 MG: 20 INJECTION, SOLUTION INTRAVENOUS at 01:02

## 2022-02-23 RX ADMIN — PROPOFOL 60 MG: 10 INJECTION, EMULSION INTRAVENOUS at 01:02

## 2022-02-23 NOTE — H&P
[facility]  Colon and Rectal Surgery    [address]  [place], [place]  Tel: [phone]  Fax: [phone]  [email or web address]   MD Abraham Ferrara MD Brian Kann, MD W. Forrest Johnston, MD Matthew Giglia, MD Jennifer Paruch, MD William Kethman, MD Danielle Kay, MD     Patient name: Rico Park Jr.   YOB: 1945   MRN: [id number]  Date of procedure: 02/23/2022    Procedure: Colonoscopy  Indications: Previous adenomatous polyp and No family history of colorectal cancer    He has had 4 prior colonoscopies    The patient was informed of the availability of a certified  without charge. A certified  was not necessary for this visit.    Sedation plan: MAC  ASA: ASA 2 - Patient with mild systemic disease with no functional limitations    Review of Systems  See above    Past Medical History:   Diagnosis Date    Cataract      Past Surgical History:   Procedure Laterality Date    CATARACT EXTRACTION      TONSILLECTOMY, ADENOIDECTOMY       Family History   Problem Relation Age of Onset    Heart disease Father     Diabetes Father     Diabetes Mother      Social History     Tobacco Use    Smoking status: Never Smoker    Smokeless tobacco: Never Used   Substance Use Topics    Alcohol use: Yes     Comment: wine every other night    Drug use: Never     Review of patient's allergies indicates:   Allergen Reactions    Pcn [penicillins] Rash       Prior to Admission medications    Medication Sig Start Date End Date Taking? Authorizing Provider   allopurinoL (ZYLOPRIM) 300 MG tablet TAKE ONE TABLET BY MOUTH ONE TIME DAILY 8/23/21  Yes Osmar Stokes MD   amLODIPine (NORVASC) 5 MG tablet Take 1 tablet (5 mg total) by mouth once daily. 2/23/21  Yes Osmar Stokes MD   atorvastatin (LIPITOR) 40 MG tablet Take 1 tablet (40 mg total) by mouth once daily. 2/24/21  Yes Osmar Stokes MD   gabapentin  (NEURONTIN) 300 MG capsule Take 1 capsule (300 mg total) by mouth once daily. 11/3/21  Yes Osmar Stokes MD   losartan (COZAAR) 100 MG tablet Take 1 tablet (100 mg total) by mouth once daily. 11/3/21  Yes Osmar Stokes MD   diphth,pertus,acell,,tetanus (BOOSTRIX) 2.5-8-5 Lf-mcg-Lf/0.5mL Syrg injection Inject into the muscle. 9/5/18   Osmar Stokes MD   ibuprofen (ADVIL,MOTRIN) 800 MG tablet Take 1 tablet (800 mg total) by mouth every 8 (eight) hours. 6/1/16   Osmar Stokes MD   pneumoc 13-zahida conj-dip cr,PF, 0.5 mL Syrg Inject into the muscle. 9/5/18   Osmar Stokes MD       Physical Examination  There were no vitals taken for this visit.     Constitutional: well developed, no cough, no dyspnea, alert and no acute distress    Head: Normocephalic, no lesions, without obvious abnormality  Eye: Normal external eye, conjunctiva, and lids, PERRL  Cardiovascular: regular rate and regular rhythm  Respiratory: normal air entry  Gastrointestinal: soft, non-tender, without masses or organomegaly  Neurologic: alert, oriented, normal speech, no focal findings or movement disorder noted  Psychiatric: appropriate, normal mood    Plan of Care    It was a pleasure meeting Mr. Park today - we will plan to perform a colonoscopy with monitored anesthesia care. The details of the procedure, the possible need for biopsy or polypectomy and the potential risks including bleeding, perforation, missed polyps were discussed in detail and they consented to undergo the procedure.      Luís Langley MD - Staff Surgeon  Department of Colon & Rectal Surgery  [facility]

## 2022-02-23 NOTE — ANESTHESIA PREPROCEDURE EVALUATION
[facility]  Anesthesia Pre-Operative Evaluation       Patient Name: Rico Park Jr.  YOB: 1945  MRN: [id number]  [facility]: [id number]      Code Status: No Order   Date of Procedure: 2/23/2022  Anesthesia: General Procedure: Procedure(s) (LRB):  COLONOSCOPY (N/A)  Pre-Operative Diagnosis: Encounter for screening colonoscopy [Z12.11]  Proceduralist: Surgeon(s) and Role:     * Luís Langley MD - Primary        SUBJECTIVE:   Rico Park Jr. is a 76 y.o. male who  has a past medical history of Cataract. No notes on file    Revised cardiac risk index (RCRI) score is 0.    he has a current medication list which includes the following long-term medication(s): amlodipine, atorvastatin, gabapentin, and losartan.   ALLERGIES:     Review of patient's allergies indicates:   Allergen Reactions    Pcn [penicillins] Rash     LDA:      Lines/Drains/Airways     Airway  Duration                Airway - Non-Surgical 08/13/15 0820 Nasal Cannula 2386 days              MEDICATIONS:     Antibiotics (From admission, onward)            None        VTE Risk Mitigation (From admission, onward)    None        Current Facility-Administered Medications   Medication Dose Route Frequency Provider Last Rate Last Admin    0.9%  NaCl infusion   Intravenous Continuous Luís Langley MD              History:   There are no hospital problems to display for this patient.    Surgical History:    has a past surgical history that includes TONSILLECTOMY, ADENOIDECTOMY and Cataract extraction.   Social History:    has no history on file for sexual activity.  reports that he has never smoked. He has never used smokeless tobacco. He reports current alcohol use.     OBJECTIVE:     Vital Signs (Most Recent):    Vital Signs Range (Last 24H):          There is no height or weight on file to calculate BMI.   Wt Readings from Last 4 Encounters:   11/03/21 62 kg (136 lb 11 oz)   04/15/21 64.8 kg (142 lb 13.7 oz)    08/19/20 62.6 kg (138 lb 0.1 oz)   09/24/19 62.5 kg (137 lb 12.6 oz)     Significant Labs:  Lab Results   Component Value Date    WBC 10.09 11/03/2021    HGB 11.0 (L) 11/03/2021    HCT 34.0 (L) 11/03/2021     11/03/2021     11/03/2021    K 4.3 11/03/2021     11/03/2021    CREATININE 1.0 11/03/2021    BUN 14 11/03/2021    CO2 24 11/03/2021    GLU 92 11/03/2021    CALCIUM 9.3 11/03/2021    ALKPHOS 75 11/03/2021    ALT 20 11/03/2021    AST 32 11/03/2021    ALBUMIN 3.7 11/03/2021    HGBA1C 5.3 11/03/2021     No LMP for male patient.  No results found for this or any previous visit (from the past 72 hour(s)).    EKG:   Results for orders placed or performed during the hospital encounter of 02/28/19   SCHEDULED EKG 12-LEAD (to Muse)    Collection Time: 02/28/19 10:25 AM    Narrative    Test Reason : R06.09,    Vent. Rate : 061 BPM     Atrial Rate : 061 BPM     P-R Int : 170 ms          QRS Dur : 082 ms      QT Int : 428 ms       P-R-T Axes : 046 038 058 degrees     QTc Int : 430 ms    Normal sinus rhythm  Normal ECG    Confirmed by Stanislaw Thomson MD ([id number]) on 3/1/2019 12:24:14 PM    Referred By: YAZMIN GALLO           Confirmed By:Satnislaw Thomson MD       TTE:  No results found for this or any previous visit.  No results found for: EF   No results found for this or any previous visit.  NORI:  No results found for this or any previous visit.  Stress Test:  No results found for this or any previous visit.     LHC:  No results found for this or any previous visit.     PFT:  No results found for: FEV1, FVC, QRM9RIE, TLC, DLCO   ASSESSMENT/PLAN:         Pre-op Assessment    I have reviewed the Patient Summary Reports.     I have reviewed the Nursing Notes.    I have reviewed the Medications.     Review of Systems  Anesthesia Hx:  No problems with previous Anesthesia    Hematology/Oncology:  Hematology Normal   Oncology Normal     EENT/Dental:EENT/Dental Normal   Cardiovascular:  Cardiovascular  Normal Exercise tolerance: good     Pulmonary:  Pulmonary Normal    Renal/:  Renal/ Normal     Hepatic/GI:  Hepatic/GI Normal    Musculoskeletal:  Musculoskeletal Normal    Neurological:  Neurology Normal    Endocrine:  Endocrine Normal    Dermatological:  Skin Normal    Psych:  Psychiatric Normal           Physical Exam  General: Well nourished    Airway:  Mallampati: III / II  Mouth Opening: Normal  TM Distance: Normal  Tongue: Normal  Neck ROM: Normal ROM    Dental:  Intact        Anesthesia Plan  Type of Anesthesia, risks & benefits discussed:    Anesthesia Type: Gen ETT, Gen Natural Airway, MAC  Intra-op Monitoring Plan: Standard ASA Monitors  Post Op Pain Control Plan: multimodal analgesia and IV/PO Opioids PRN  Induction:  IV  Airway Plan: Direct and Video, Post-Induction  Informed Consent: Informed consent signed with the Patient and all parties understand the risks and agree with anesthesia plan.  All questions answered.   ASA Score: 1  Day of Surgery Review of History & Physical: H&P completed by Anesthesiologist.  Anesthesia Plan Notes: Chart reviewed, patient interviewed and examined.  The anesthetic plan was explained.  Risks, benefits, and alternatives were discussed. Questions were answered and the consent was signed.        WILL Sandoval M.D.         Ready For Surgery From Anesthesia Perspective.     .

## 2022-02-23 NOTE — PROVATION PATIENT INSTRUCTIONS
Discharge Summary/Instructions after an Endoscopic Procedure  Patient Name: Rico Park  Patient MRN: [id number]  Patient YOB: 1945 Wednesday, February 23, 2022  Luís Langley MD  Dear patient,  As a result of recent federal legislation (The Federal Cures Act), you may   receive lab or pathology results from your procedure in your [facility]   account before your physician is able to contact you. Your physician or   their representative will relay the results to you with their   recommendations at their soonest availability.  Thank you,  RESTRICTIONS:  During your procedure today, you received medications for sedation.  These   medications may affect your judgment, balance and coordination.  Therefore,   for 24 hours, you have the following restrictions:   - DO NOT drive a car, operate machinery, make legal/financial decisions,   sign important papers or drink alcohol.    ACTIVITY:  Today: no heavy lifting, straining or running due to procedural   sedation/anesthesia.  The following day: return to full activity including work.  DIET:  Eat and drink normally unless instructed otherwise.     TREATMENT FOR COMMON SIDE EFFECTS:  - Mild abdominal pain, nausea, belching, bloating or excessive gas:  rest,   eat lightly and use a heating pad.  - Sore Throat: treat with throat lozenges and/or gargle with warm salt   water.  - Because air was used during the procedure, expelling large amounts of air   from your rectum or belching is normal.  - If a bowel prep was taken, you may not have a bowel movement for 1-3 days.    This is normal.  SYMPTOMS TO WATCH FOR AND REPORT TO YOUR PHYSICIAN:  1. Abdominal pain or bloating, other than gas cramps.  2. Chest pain.  3. Back pain.  4. Signs of infection such as: chills or fever occurring within 24 hours   after the procedure.  5. Rectal bleeding, which would show as bright red, maroon, or black stools.   (A tablespoon of blood from the rectum is not serious,  especially if   hemorrhoids are present.)  6. Vomiting.  7. Weakness or dizziness.  GO DIRECTLY TO THE NEAREST EMERGENCY ROOM IF YOU HAVE ANY OF THE FOLLOWING:      Difficulty breathing              Chills and/or fever over 101 F   Persistent vomiting and/or vomiting blood   Severe abdominal pain   Severe chest pain   Black, tarry stools   Bleeding- more than one tablespoon   Any other symptom or condition that you feel may need urgent attention  Your doctor recommends these additional instructions:  If any biopsies were taken, your doctors clinic will contact you in 1 to 2   weeks with any results.  - Discharge patient to home.   - Resume previous diet.   - Continue present medications.   - Repeat colonoscopy in 7 years for surveillance.   - Return to referring physician as previously scheduled.  For questions, problems or results please call your physician - Luís Langley MD at Work:  [phone].  [facility] EMERGENCY ROOM PHONE NUMBER: [phone]  IF A COMPLICATION OR EMERGENCY SITUATION ARISES AND YOU ARE UNABLE TO REACH   YOUR PHYSICIAN - GO DIRECTLY TO THE EMERGENCY ROOM.  MD Luís Thompson MD  2/23/2022 1:20:36 PM  This report has been verified and signed electronically.  Dear patient,  As a result of recent federal legislation (The Federal Cures Act), you may   receive lab or pathology results from your procedure in your [facility]   account before your physician is able to contact you. Your physician or   their representative will relay the results to you with their   recommendations at their soonest availability.  Thank you,  PROVATION

## 2022-02-23 NOTE — TRANSFER OF CARE
Anesthesia Transfer of Care Note    Patient: Rico Park Jr.    Procedure(s) Performed: Procedure(s) (LRB):  COLONOSCOPY (N/A)    Patient location: PACU    Anesthesia Type: general    Transport from OR: Transported from OR on 100% O2 by closed face mask with adequate spontaneous ventilation    Post pain: adequate analgesia    Post assessment: no apparent anesthetic complications and tolerated procedure well    Post vital signs: stable    Level of consciousness: responds to stimulation and sedated    Nausea/Vomiting: no nausea/vomiting    Complications: none    Transfer of care protocol was followed      Last vitals: There were no vitals taken for this visit.

## 2022-02-23 NOTE — ANESTHESIA POSTPROCEDURE EVALUATION
Anesthesia Post Evaluation    Patient: Rico Park Jr.    Procedure(s) Performed: Procedure(s) (LRB):  COLONOSCOPY (N/A)    Final Anesthesia Type: general      Patient location during evaluation: PACU  Patient participation: Yes- Able to Participate  Level of consciousness: awake and alert  Post-procedure vital signs: reviewed and stable  Pain management: adequate  Airway patency: patent    PONV status at discharge: No PONV  Anesthetic complications: no      Cardiovascular status: blood pressure returned to baseline  Respiratory status: unassisted  Hydration status: euvolemic  Follow-up not needed.          Vitals Value Taken Time   /67 02/23/22 1351   Temp 36.7 °C (98 °F) 02/23/22 1322   Pulse 52 02/23/22 1351   Resp 14 02/23/22 1351   SpO2 99 % 02/23/22 1351         Event Time   Out of Recovery 13:55:01         Pain/Sandra Score: Sandra Score: 10 (2/23/2022  1:51 PM)

## 2022-04-25 DIAGNOSIS — I10 BENIGN ESSENTIAL HTN: ICD-10-CM

## 2022-04-25 DIAGNOSIS — E78.5 HYPERLIPIDEMIA, UNSPECIFIED HYPERLIPIDEMIA TYPE: ICD-10-CM

## 2022-04-25 NOTE — TELEPHONE ENCOUNTER
Care Due:                  Date            Visit Type   Department     Provider  --------------------------------------------------------------------------------                                MYCHART                              FOLLOWUP/OF  [facility] INTERNAL  Last Visit: 11-      FICE VISIT   MEDICINE       Osmar Stokes  Next Visit: None Scheduled  None         None Found                                                            Last  Test          Frequency    Reason                     Performed    Due Date  --------------------------------------------------------------------------------    Uric Acid...  12 months..  allopurinoL..............  Not Found    Overdue    Powered by [employer]. Reference number: [id number].   4/25/2022 12:00:25 PM CDT

## 2022-04-26 RX ORDER — ATORVASTATIN CALCIUM 40 MG/1
TABLET, FILM COATED ORAL
Qty: 90 TABLET | Refills: 1 | Status: SHIPPED | OUTPATIENT
Start: 2022-04-26 | End: 2022-09-29 | Stop reason: SDUPTHER

## 2022-04-26 RX ORDER — AMLODIPINE BESYLATE 5 MG/1
TABLET ORAL
Qty: 90 TABLET | Refills: 1 | Status: SHIPPED | OUTPATIENT
Start: 2022-04-26 | End: 2022-09-29 | Stop reason: SDUPTHER

## 2022-04-26 NOTE — TELEPHONE ENCOUNTER
Refill Authorization Note   Rico Park  is requesting a refill authorization.  Brief Assessment and Rationale for Refill:  Approve    -Medication-Related Problems Identified: Requires labs  Medication Therapy Plan:       Medication Reconciliation Completed: No   Comments:     Provider Staff:     Action is required for this patient.   Please see care gap opportunities below in Care Due Message.     Thanks!  [facility]     Appointments      Date Provider   Last Visit   11/3/2021 Osmar Stokes MD   Next Visit   Visit date not found Osmar Stokes MD     Note composed:2:47 PM 04/26/2022           Note composed:2:47 PM 04/26/2022

## 2022-05-13 ENCOUNTER — IMMUNIZATION (OUTPATIENT)
Dept: INTERNAL MEDICINE | Facility: CLINIC | Age: 77
End: 2022-05-13
Payer: MEDICARE

## 2022-05-13 DIAGNOSIS — Z23 NEED FOR VACCINATION: Primary | ICD-10-CM

## 2022-05-13 PROCEDURE — 91300 COVID-19, MRNA, LNP-S, PF, 30 MCG/0.3 ML DOSE VACCINE: CPT | Mod: PBBFAC | Performed by: INTERNAL MEDICINE

## 2022-09-29 ENCOUNTER — OFFICE VISIT (OUTPATIENT)
Dept: INTERNAL MEDICINE | Facility: CLINIC | Age: 77
End: 2022-09-29
Attending: INTERNAL MEDICINE
Payer: MEDICARE

## 2022-09-29 ENCOUNTER — LAB VISIT (OUTPATIENT)
Dept: LAB | Facility: OTHER | Age: 77
End: 2022-09-29
Attending: INTERNAL MEDICINE
Payer: MEDICARE

## 2022-09-29 VITALS
SYSTOLIC BLOOD PRESSURE: 116 MMHG | HEART RATE: 63 BPM | OXYGEN SATURATION: 98 % | BODY MASS INDEX: 21.69 KG/M2 | HEIGHT: 66 IN | WEIGHT: 134.94 LBS | DIASTOLIC BLOOD PRESSURE: 56 MMHG

## 2022-09-29 DIAGNOSIS — I10 BENIGN ESSENTIAL HTN: ICD-10-CM

## 2022-09-29 DIAGNOSIS — Z00.00 ANNUAL PHYSICAL EXAM: Primary | ICD-10-CM

## 2022-09-29 DIAGNOSIS — Z00.00 ANNUAL PHYSICAL EXAM: ICD-10-CM

## 2022-09-29 DIAGNOSIS — E78.5 HYPERLIPIDEMIA, UNSPECIFIED HYPERLIPIDEMIA TYPE: ICD-10-CM

## 2022-09-29 DIAGNOSIS — R23.4 CHANGES IN SKIN TEXTURE: ICD-10-CM

## 2022-09-29 DIAGNOSIS — R79.9 ABNORMAL FINDING OF BLOOD CHEMISTRY, UNSPECIFIED: ICD-10-CM

## 2022-09-29 DIAGNOSIS — M10.9 ACUTE GOUT OF FOOT, UNSPECIFIED CAUSE, UNSPECIFIED LATERALITY: ICD-10-CM

## 2022-09-29 DIAGNOSIS — R79.89 ELEVATED FERRITIN: ICD-10-CM

## 2022-09-29 LAB
ALBUMIN SERPL BCP-MCNC: 3.8 G/DL (ref 3.5–5.2)
ALP SERPL-CCNC: 62 U/L (ref 55–135)
ALT SERPL W/O P-5'-P-CCNC: 12 U/L (ref 10–44)
ANION GAP SERPL CALC-SCNC: 9 MMOL/L (ref 8–16)
AST SERPL-CCNC: 22 U/L (ref 10–40)
BASOPHILS # BLD AUTO: 0.05 K/UL (ref 0–0.2)
BASOPHILS NFR BLD: 0.5 % (ref 0–1.9)
BILIRUB SERPL-MCNC: 0.4 MG/DL (ref 0.1–1)
BUN SERPL-MCNC: 23 MG/DL (ref 8–23)
CALCIUM SERPL-MCNC: 9.3 MG/DL (ref 8.7–10.5)
CHLORIDE SERPL-SCNC: 105 MMOL/L (ref 95–110)
CHOLEST SERPL-MCNC: 138 MG/DL (ref 120–199)
CHOLEST/HDLC SERPL: 2.4 {RATIO} (ref 2–5)
CO2 SERPL-SCNC: 24 MMOL/L (ref 23–29)
CREAT SERPL-MCNC: 1.3 MG/DL (ref 0.5–1.4)
DIFFERENTIAL METHOD: ABNORMAL
EOSINOPHIL # BLD AUTO: 0.2 K/UL (ref 0–0.5)
EOSINOPHIL NFR BLD: 1.4 % (ref 0–8)
ERYTHROCYTE [DISTWIDTH] IN BLOOD BY AUTOMATED COUNT: 11.9 % (ref 11.5–14.5)
EST. GFR  (NO RACE VARIABLE): 57 ML/MIN/1.73 M^2
ESTIMATED AVG GLUCOSE: 103 MG/DL (ref 68–131)
FERRITIN SERPL-MCNC: 842 NG/ML (ref 20–300)
GLUCOSE SERPL-MCNC: 85 MG/DL (ref 70–110)
HBA1C MFR BLD: 5.2 % (ref 4–5.6)
HCT VFR BLD AUTO: 32.9 % (ref 40–54)
HDLC SERPL-MCNC: 58 MG/DL (ref 40–75)
HDLC SERPL: 42 % (ref 20–50)
HGB BLD-MCNC: 11 G/DL (ref 14–18)
IMM GRANULOCYTES # BLD AUTO: 0.07 K/UL (ref 0–0.04)
IMM GRANULOCYTES NFR BLD AUTO: 0.6 % (ref 0–0.5)
IRON SERPL-MCNC: 148 UG/DL (ref 45–160)
LDLC SERPL CALC-MCNC: 33 MG/DL (ref 63–159)
LYMPHOCYTES # BLD AUTO: 3.6 K/UL (ref 1–4.8)
LYMPHOCYTES NFR BLD: 32.9 % (ref 18–48)
MCH RBC QN AUTO: 33.7 PG (ref 27–31)
MCHC RBC AUTO-ENTMCNC: 33.4 G/DL (ref 32–36)
MCV RBC AUTO: 101 FL (ref 82–98)
MONOCYTES # BLD AUTO: 0.7 K/UL (ref 0.3–1)
MONOCYTES NFR BLD: 6.6 % (ref 4–15)
NEUTROPHILS # BLD AUTO: 6.3 K/UL (ref 1.8–7.7)
NEUTROPHILS NFR BLD: 58 % (ref 38–73)
NONHDLC SERPL-MCNC: 80 MG/DL
NRBC BLD-RTO: 0 /100 WBC
PLATELET # BLD AUTO: 267 K/UL (ref 150–450)
PMV BLD AUTO: 10.9 FL (ref 9.2–12.9)
POTASSIUM SERPL-SCNC: 4 MMOL/L (ref 3.5–5.1)
PROT SERPL-MCNC: 7.2 G/DL (ref 6–8.4)
RBC # BLD AUTO: 3.26 M/UL (ref 4.6–6.2)
SATURATED IRON: 43 % (ref 20–50)
SODIUM SERPL-SCNC: 138 MMOL/L (ref 136–145)
TOTAL IRON BINDING CAPACITY: 348 UG/DL (ref 250–450)
TRANSFERRIN SERPL-MCNC: 235 MG/DL (ref 200–375)
TRIGL SERPL-MCNC: 235 MG/DL (ref 30–150)
TSH SERPL DL<=0.005 MIU/L-ACNC: 3.25 UIU/ML (ref 0.4–4)
WBC # BLD AUTO: 10.92 K/UL (ref 3.9–12.7)

## 2022-09-29 PROCEDURE — 1101F PR PT FALLS ASSESS DOC 0-1 FALLS W/OUT INJ PAST YR: ICD-10-PCS | Mod: CPTII,S$GLB,, | Performed by: INTERNAL MEDICINE

## 2022-09-29 PROCEDURE — 3074F SYST BP LT 130 MM HG: CPT | Mod: CPTII,S$GLB,, | Performed by: INTERNAL MEDICINE

## 2022-09-29 PROCEDURE — 84466 ASSAY OF TRANSFERRIN: CPT | Performed by: INTERNAL MEDICINE

## 2022-09-29 PROCEDURE — 1101F PT FALLS ASSESS-DOCD LE1/YR: CPT | Mod: CPTII,S$GLB,, | Performed by: INTERNAL MEDICINE

## 2022-09-29 PROCEDURE — 1126F PR PAIN SEVERITY QUANTIFIED, NO PAIN PRESENT: ICD-10-PCS | Mod: CPTII,S$GLB,, | Performed by: INTERNAL MEDICINE

## 2022-09-29 PROCEDURE — 3288F PR FALLS RISK ASSESSMENT DOCUMENTED: ICD-10-PCS | Mod: CPTII,S$GLB,, | Performed by: INTERNAL MEDICINE

## 2022-09-29 PROCEDURE — 99999 PR PBB SHADOW E&M-EST. PATIENT-LVL III: ICD-10-PCS | Mod: PBBFAC,,, | Performed by: INTERNAL MEDICINE

## 2022-09-29 PROCEDURE — 99999 PR PBB SHADOW E&M-EST. PATIENT-LVL III: CPT | Mod: PBBFAC,,, | Performed by: INTERNAL MEDICINE

## 2022-09-29 PROCEDURE — 82728 ASSAY OF FERRITIN: CPT | Performed by: INTERNAL MEDICINE

## 2022-09-29 PROCEDURE — 99214 PR OFFICE/OUTPT VISIT, EST, LEVL IV, 30-39 MIN: ICD-10-PCS | Mod: S$GLB,,, | Performed by: INTERNAL MEDICINE

## 2022-09-29 PROCEDURE — 99214 OFFICE O/P EST MOD 30 MIN: CPT | Mod: S$GLB,,, | Performed by: INTERNAL MEDICINE

## 2022-09-29 PROCEDURE — 1126F AMNT PAIN NOTED NONE PRSNT: CPT | Mod: CPTII,S$GLB,, | Performed by: INTERNAL MEDICINE

## 2022-09-29 PROCEDURE — 3288F FALL RISK ASSESSMENT DOCD: CPT | Mod: CPTII,S$GLB,, | Performed by: INTERNAL MEDICINE

## 2022-09-29 PROCEDURE — 85025 COMPLETE CBC W/AUTO DIFF WBC: CPT | Performed by: INTERNAL MEDICINE

## 2022-09-29 PROCEDURE — 1159F MED LIST DOCD IN RCRD: CPT | Mod: CPTII,S$GLB,, | Performed by: INTERNAL MEDICINE

## 2022-09-29 PROCEDURE — 84165 PATHOLOGIST INTERPRETATION SPE: ICD-10-PCS | Mod: 26,,, | Performed by: PATHOLOGY

## 2022-09-29 PROCEDURE — 3078F DIAST BP <80 MM HG: CPT | Mod: CPTII,S$GLB,, | Performed by: INTERNAL MEDICINE

## 2022-09-29 PROCEDURE — 3078F PR MOST RECENT DIASTOLIC BLOOD PRESSURE < 80 MM HG: ICD-10-PCS | Mod: CPTII,S$GLB,, | Performed by: INTERNAL MEDICINE

## 2022-09-29 PROCEDURE — 84165 PROTEIN E-PHORESIS SERUM: CPT | Mod: 26,,, | Performed by: PATHOLOGY

## 2022-09-29 PROCEDURE — 84443 ASSAY THYROID STIM HORMONE: CPT | Performed by: INTERNAL MEDICINE

## 2022-09-29 PROCEDURE — 80053 COMPREHEN METABOLIC PANEL: CPT | Performed by: INTERNAL MEDICINE

## 2022-09-29 PROCEDURE — 3074F PR MOST RECENT SYSTOLIC BLOOD PRESSURE < 130 MM HG: ICD-10-PCS | Mod: CPTII,S$GLB,, | Performed by: INTERNAL MEDICINE

## 2022-09-29 PROCEDURE — 36415 COLL VENOUS BLD VENIPUNCTURE: CPT | Performed by: INTERNAL MEDICINE

## 2022-09-29 PROCEDURE — 83036 HEMOGLOBIN GLYCOSYLATED A1C: CPT | Performed by: INTERNAL MEDICINE

## 2022-09-29 PROCEDURE — 80061 LIPID PANEL: CPT | Performed by: INTERNAL MEDICINE

## 2022-09-29 PROCEDURE — 1159F PR MEDICATION LIST DOCUMENTED IN MEDICAL RECORD: ICD-10-PCS | Mod: CPTII,S$GLB,, | Performed by: INTERNAL MEDICINE

## 2022-09-29 PROCEDURE — 84165 PROTEIN E-PHORESIS SERUM: CPT | Performed by: INTERNAL MEDICINE

## 2022-09-29 RX ORDER — LOSARTAN POTASSIUM 100 MG/1
100 TABLET ORAL DAILY
Qty: 90 TABLET | Refills: 3 | Status: SHIPPED | OUTPATIENT
Start: 2022-09-29 | End: 2023-09-08

## 2022-09-29 RX ORDER — ALLOPURINOL 300 MG/1
300 TABLET ORAL DAILY
Qty: 90 TABLET | Refills: 3 | Status: SHIPPED | OUTPATIENT
Start: 2022-09-29 | End: 2023-08-27 | Stop reason: SDUPTHER

## 2022-09-29 RX ORDER — GABAPENTIN 300 MG/1
300 CAPSULE ORAL DAILY
Qty: 90 CAPSULE | Refills: 2 | Status: SHIPPED | OUTPATIENT
Start: 2022-09-29 | End: 2022-10-22 | Stop reason: SDUPTHER

## 2022-09-29 RX ORDER — ATORVASTATIN CALCIUM 40 MG/1
40 TABLET, FILM COATED ORAL DAILY
Qty: 90 TABLET | Refills: 3 | Status: SHIPPED | OUTPATIENT
Start: 2022-09-29 | End: 2023-08-27

## 2022-09-29 RX ORDER — AMLODIPINE BESYLATE 5 MG/1
5 TABLET ORAL DAILY
Qty: 90 TABLET | Refills: 3 | Status: SHIPPED | OUTPATIENT
Start: 2022-09-29 | End: 2023-08-27

## 2022-09-29 NOTE — PROGRESS NOTES
"Subjective:       Patient ID: Rico Park Jr. is a 77 y.o. male.    Chief Complaint: Annual Exam    Here for annual exam    Patient presents today for routine evaluation, physical, and labs. Patient has no major concerns or complaints today.     Hx of macrocytic anemia. Followed by Dr Sanabria. Last rec was for BM biopsy . Pt deferred due to being asymptomatic and is monitoring. He denies night sweats, unexplained weight loss, easy bruising/bleeding, recurrent infection, bone pains, malaise.      He exercises regularly and stamina is fine. Denies CP at rest or with exertion, dizziness with exertion, shortness of breath out of proportion to level of activity, frequent or sustained palpitations, orthopnea, PND, or LE edema.      Pt is tolerating statin without frequent muscle cramps or diffuse myalgias or weakness.             Review of Systems   Constitutional:  Negative for appetite change, chills, fever and unexpected weight change.   HENT:  Negative for hearing loss, sore throat and trouble swallowing.    Eyes:  Negative for visual disturbance.   Respiratory:  Negative for cough, chest tightness and shortness of breath.    Cardiovascular:  Negative for chest pain and leg swelling.   Gastrointestinal:  Negative for abdominal pain, blood in stool, constipation, diarrhea, nausea and vomiting.   Endocrine: Negative for polydipsia and polyuria.   Genitourinary:  Negative for decreased urine volume, difficulty urinating, dysuria, frequency and urgency.   Musculoskeletal:  Negative for gait problem.   Skin:  Negative for rash.   Neurological:  Negative for dizziness and numbness.   Psychiatric/Behavioral:  The patient is not nervous/anxious.      Objective:      Vitals:    09/29/22 1450   BP: (!) 116/56   BP Location: Left arm   Pulse: 63   SpO2: 98%   Weight: 61.2 kg (134 lb 14.7 oz)   Height: 5' 6" (1.676 m)      Physical Exam  Vitals and nursing note reviewed.   Constitutional:       General: He is not in acute " distress.     Appearance: Normal appearance. He is well-developed.   HENT:      Head: Normocephalic and atraumatic.      Mouth/Throat:      Pharynx: No oropharyngeal exudate.   Eyes:      General: No scleral icterus.     Conjunctiva/sclera: Conjunctivae normal.      Pupils: Pupils are equal, round, and reactive to light.   Neck:      Thyroid: No thyromegaly.   Cardiovascular:      Rate and Rhythm: Normal rate and regular rhythm.      Heart sounds: Normal heart sounds. No murmur heard.  Pulmonary:      Effort: Pulmonary effort is normal.      Breath sounds: Normal breath sounds. No wheezing or rales.   Abdominal:      General: There is no distension.   Musculoskeletal:         General: No tenderness.   Lymphadenopathy:      Cervical: No cervical adenopathy.   Skin:     General: Skin is warm and dry.   Neurological:      Mental Status: He is alert and oriented to person, place, and time.   Psychiatric:         Behavior: Behavior normal.       Assessment:       1. Annual physical exam    2. Acute gout of foot, unspecified cause, unspecified laterality    3. Benign essential HTN    4. Hyperlipidemia, unspecified hyperlipidemia type    5. Elevated ferritin    6. Abnormal finding of blood chemistry, unspecified    7. Changes in skin texture          Plan:       Rico was seen today for annual exam.    Diagnoses and all orders for this visit:    Annual physical exam  -     CBC Auto Differential; Future  -     Comprehensive Metabolic Panel; Future  -     Hemoglobin A1C; Future  -     TSH; Future    Acute gout of foot, unspecified cause, unspecified laterality  -     allopurinoL (ZYLOPRIM) 300 MG tablet; Take 1 tablet (300 mg total) by mouth once daily.  -     gabapentin (NEURONTIN) 300 MG capsule; Take 1 capsule (300 mg total) by mouth once daily.    Benign essential HTN  -     amLODIPine (NORVASC) 5 MG tablet; Take 1 tablet (5 mg total) by mouth once daily.  -     losartan (COZAAR) 100 MG tablet; Take 1 tablet (100 mg  total) by mouth once daily.    Hyperlipidemia, unspecified hyperlipidemia type  -     atorvastatin (LIPITOR) 40 MG tablet; Take 1 tablet (40 mg total) by mouth once daily.  -     Lipid Panel; Future    Elevated ferritin  -     PROTEIN ELECTROPHORESIS, SERUM; Future  -     Ferritin; Future  -     IRON AND TIBC; Future    Abnormal finding of blood chemistry, unspecified  -     CBC Auto Differential; Future    Changes in skin texture  -     TSH; Future         Osmar Pfeiffer MD  Internal Medicine-[facility]        Side effects of medication(s) were discussed in detail and patient voiced understanding.  Patient will call back for any issues or complications.

## 2022-09-30 LAB
ALBUMIN SERPL ELPH-MCNC: 3.76 G/DL (ref 3.35–5.55)
ALPHA1 GLOB SERPL ELPH-MCNC: 0.38 G/DL (ref 0.17–0.41)
ALPHA2 GLOB SERPL ELPH-MCNC: 0.76 G/DL (ref 0.43–0.99)
B-GLOBULIN SERPL ELPH-MCNC: 0.76 G/DL (ref 0.5–1.1)
GAMMA GLOB SERPL ELPH-MCNC: 1.04 G/DL (ref 0.67–1.58)
PATHOLOGIST INTERPRETATION SPE: NORMAL
PROT SERPL-MCNC: 6.7 G/DL (ref 6–8.4)

## 2022-10-22 DIAGNOSIS — I10 BENIGN ESSENTIAL HTN: ICD-10-CM

## 2022-10-22 RX ORDER — AMLODIPINE BESYLATE 5 MG/1
5 TABLET ORAL DAILY
Qty: 90 TABLET | Refills: 3 | Status: CANCELLED | OUTPATIENT
Start: 2022-10-22

## 2022-10-22 NOTE — TELEPHONE ENCOUNTER
No new care gaps identified.  [facility] Embedded Care Gaps. Reference number: [id number]. 10/22/2022   1:39:22 PM CDT

## 2022-10-24 ENCOUNTER — PATIENT MESSAGE (OUTPATIENT)
Dept: INTERNAL MEDICINE | Facility: CLINIC | Age: 77
End: 2022-10-24
Payer: MEDICARE

## 2022-11-07 ENCOUNTER — IMMUNIZATION (OUTPATIENT)
Dept: INTERNAL MEDICINE | Facility: CLINIC | Age: 77
End: 2022-11-07
Payer: MEDICARE

## 2022-11-07 DIAGNOSIS — Z23 NEED FOR VACCINATION: Primary | ICD-10-CM

## 2022-11-07 PROCEDURE — 0124A COVID-19, MRNA, LNP-S, BIVALENT BOOSTER, PF, 30 MCG/0.3 ML DOSE: CPT | Mod: CV19,PBBFAC | Performed by: INTERNAL MEDICINE

## 2022-11-07 PROCEDURE — 91312 COVID-19, MRNA, LNP-S, BIVALENT BOOSTER, PF, 30 MCG/0.3 ML DOSE: ICD-10-PCS | Mod: S$GLB,,, | Performed by: INTERNAL MEDICINE

## 2022-11-07 PROCEDURE — 91312 COVID-19, MRNA, LNP-S, BIVALENT BOOSTER, PF, 30 MCG/0.3 ML DOSE: CPT | Mod: S$GLB,,, | Performed by: INTERNAL MEDICINE

## 2023-08-26 DIAGNOSIS — E78.5 HYPERLIPIDEMIA, UNSPECIFIED HYPERLIPIDEMIA TYPE: ICD-10-CM

## 2023-08-26 DIAGNOSIS — M10.9 ACUTE GOUT OF FOOT, UNSPECIFIED CAUSE, UNSPECIFIED LATERALITY: ICD-10-CM

## 2023-08-26 DIAGNOSIS — I10 BENIGN ESSENTIAL HTN: ICD-10-CM

## 2023-08-26 NOTE — TELEPHONE ENCOUNTER
Care Due:                  Date            Visit Type   Department     Provider  --------------------------------------------------------------------------------                                MYCHART                              ANNUAL                              CHECKUP/PHY  [facility] INTERNAL  Last Visit: 09-      S            JOSIAH Stokes  Next Visit: None Scheduled  None         None Found                                                            Last  Test          Frequency    Reason                     Performed    Due Date  --------------------------------------------------------------------------------    Office Visit  12 months..  allopurinoL, amLODIPine,   09- 09-                             atorvastatin, losartan...    CBC.........  12 months..  allopurinoL..............  09- 09-    CMP.........  12 months..  allopurinoL,               09- 09-                             atorvastatin, losartan...    Lipid Panel.  12 months..  atorvastatin.............  09- 09-    Uric Acid...  12 months..  allopurinoL..............  Not Found    Overdue    [employer] Embedded Care Due Messages. Reference number: [id number].   8/26/2023 11:41:26 AM CDT

## 2023-08-27 RX ORDER — ALLOPURINOL 300 MG/1
300 TABLET ORAL
Qty: 90 TABLET | Refills: 2 | Status: SHIPPED | OUTPATIENT
Start: 2023-08-27

## 2023-08-27 RX ORDER — ATORVASTATIN CALCIUM 40 MG/1
40 TABLET, FILM COATED ORAL
Qty: 90 TABLET | Refills: 0 | Status: SHIPPED | OUTPATIENT
Start: 2023-08-27 | End: 2024-02-18

## 2023-08-27 RX ORDER — AMLODIPINE BESYLATE 5 MG/1
5 TABLET ORAL
Qty: 90 TABLET | Refills: 0 | Status: SHIPPED | OUTPATIENT
Start: 2023-08-27

## 2023-08-28 NOTE — TELEPHONE ENCOUNTER
Refill Routing Note   Medication(s) are not appropriate for processing by [facility] for the following reason(s):      Required labs outdated    [facility] action(s):  Defer  Approve Care Due:  Appointment due  Labs due            Appointments  past 12m or future 3m with PCP    Date Provider   Last Visit   9/29/2022 Osmar Stokes MD   Next Visit   Visit date not found Osmar Stokes MD   ED visits in past 90 days: 0        Note composed:10:01 PM 08/27/2023

## 2023-09-08 DIAGNOSIS — I10 BENIGN ESSENTIAL HTN: ICD-10-CM

## 2023-09-08 RX ORDER — LOSARTAN POTASSIUM 100 MG/1
100 TABLET ORAL
Qty: 90 TABLET | Refills: 0 | Status: SHIPPED | OUTPATIENT
Start: 2023-09-08 | End: 2024-02-18

## 2023-09-08 NOTE — TELEPHONE ENCOUNTER
No care due was identified.  [facility] Embedded Care Due Messages. Reference number: [id number].   9/08/2023 12:05:30 PM CDT

## 2023-09-08 NOTE — TELEPHONE ENCOUNTER
Refill Decision Note   Rico Park  is requesting a refill authorization.  Brief Assessment and Rationale for Refill:  Approve     Medication Therapy Plan:         Comments:     Note composed:6:38 PM 09/08/2023

## 2023-10-03 DIAGNOSIS — M10.9 ACUTE GOUT OF FOOT, UNSPECIFIED CAUSE, UNSPECIFIED LATERALITY: ICD-10-CM

## 2023-10-03 RX ORDER — GABAPENTIN 300 MG/1
300 CAPSULE ORAL
Qty: 90 CAPSULE | Refills: 1 | Status: SHIPPED | OUTPATIENT
Start: 2023-10-03 | End: 2024-02-05 | Stop reason: SDUPTHER

## 2023-10-03 NOTE — TELEPHONE ENCOUNTER
No care due was identified.  [facility] Embedded Care Due Messages. Reference number: [id number].   10/03/2023 2:38:27 PM CDT

## 2023-10-03 NOTE — TELEPHONE ENCOUNTER
Refill Routing Note   Medication(s) are not appropriate for processing by [facility] for the following reason(s):      Medication outside of protocol    [facility] action(s):  Route Care Due:  None identified              Appointments  past 12m or future 3m with PCP    Date Provider   Last Visit   9/29/2022 Osmar Stokes MD   Next Visit   10/11/2023 Osmar Stokes MD   ED visits in past 90 days: 0        Note composed:3:42 PM 10/03/2023

## 2023-10-04 ENCOUNTER — PATIENT MESSAGE (OUTPATIENT)
Dept: INTERNAL MEDICINE | Facility: CLINIC | Age: 78
End: 2023-10-04
Payer: MEDICARE

## 2023-10-11 ENCOUNTER — OFFICE VISIT (OUTPATIENT)
Dept: INTERNAL MEDICINE | Facility: CLINIC | Age: 78
End: 2023-10-11
Attending: INTERNAL MEDICINE
Payer: MEDICARE

## 2023-10-11 ENCOUNTER — LAB VISIT (OUTPATIENT)
Dept: LAB | Facility: OTHER | Age: 78
End: 2023-10-11
Attending: INTERNAL MEDICINE
Payer: MEDICARE

## 2023-10-11 VITALS
HEART RATE: 58 BPM | DIASTOLIC BLOOD PRESSURE: 70 MMHG | WEIGHT: 136.69 LBS | SYSTOLIC BLOOD PRESSURE: 130 MMHG | BODY MASS INDEX: 21.97 KG/M2 | OXYGEN SATURATION: 98 % | HEIGHT: 66 IN

## 2023-10-11 DIAGNOSIS — I10 BENIGN ESSENTIAL HTN: ICD-10-CM

## 2023-10-11 DIAGNOSIS — R79.9 ABNORMAL FINDING OF BLOOD CHEMISTRY, UNSPECIFIED: ICD-10-CM

## 2023-10-11 DIAGNOSIS — D53.9 MACROCYTIC ANEMIA: ICD-10-CM

## 2023-10-11 DIAGNOSIS — Z00.00 ANNUAL PHYSICAL EXAM: Primary | ICD-10-CM

## 2023-10-11 DIAGNOSIS — Z00.00 ANNUAL PHYSICAL EXAM: ICD-10-CM

## 2023-10-11 DIAGNOSIS — R79.89 ELEVATED FERRITIN: ICD-10-CM

## 2023-10-11 DIAGNOSIS — E78.5 HYPERLIPIDEMIA, UNSPECIFIED HYPERLIPIDEMIA TYPE: ICD-10-CM

## 2023-10-11 DIAGNOSIS — R23.4 CHANGES IN SKIN TEXTURE: ICD-10-CM

## 2023-10-11 LAB
ALBUMIN SERPL BCP-MCNC: 4.1 G/DL (ref 3.5–5.2)
ALP SERPL-CCNC: 65 U/L (ref 55–135)
ALT SERPL W/O P-5'-P-CCNC: 14 U/L (ref 10–44)
ANION GAP SERPL CALC-SCNC: 9 MMOL/L (ref 8–16)
AST SERPL-CCNC: 27 U/L (ref 10–40)
BASOPHILS # BLD AUTO: 0.05 K/UL (ref 0–0.2)
BASOPHILS NFR BLD: 0.4 % (ref 0–1.9)
BILIRUB SERPL-MCNC: 0.6 MG/DL (ref 0.1–1)
BUN SERPL-MCNC: 12 MG/DL (ref 8–23)
CALCIUM SERPL-MCNC: 9.2 MG/DL (ref 8.7–10.5)
CHLORIDE SERPL-SCNC: 105 MMOL/L (ref 95–110)
CHOLEST SERPL-MCNC: 158 MG/DL (ref 120–199)
CHOLEST/HDLC SERPL: 2.2 {RATIO} (ref 2–5)
CO2 SERPL-SCNC: 22 MMOL/L (ref 23–29)
CREAT SERPL-MCNC: 1 MG/DL (ref 0.5–1.4)
DIFFERENTIAL METHOD: ABNORMAL
EOSINOPHIL # BLD AUTO: 0.2 K/UL (ref 0–0.5)
EOSINOPHIL NFR BLD: 2 % (ref 0–8)
ERYTHROCYTE [DISTWIDTH] IN BLOOD BY AUTOMATED COUNT: 12.6 % (ref 11.5–14.5)
EST. GFR  (NO RACE VARIABLE): >60 ML/MIN/1.73 M^2
ESTIMATED AVG GLUCOSE: 103 MG/DL (ref 68–131)
FERRITIN SERPL-MCNC: 870 NG/ML (ref 20–300)
GLUCOSE SERPL-MCNC: 93 MG/DL (ref 70–110)
HBA1C MFR BLD: 5.2 % (ref 4–5.6)
HCT VFR BLD AUTO: 33.6 % (ref 40–54)
HDLC SERPL-MCNC: 71 MG/DL (ref 40–75)
HDLC SERPL: 44.9 % (ref 20–50)
HGB BLD-MCNC: 11.1 G/DL (ref 14–18)
IMM GRANULOCYTES # BLD AUTO: 0.05 K/UL (ref 0–0.04)
IMM GRANULOCYTES NFR BLD AUTO: 0.4 % (ref 0–0.5)
IRON SERPL-MCNC: 163 UG/DL (ref 45–160)
LDH SERPL L TO P-CCNC: 160 U/L (ref 110–260)
LDLC SERPL CALC-MCNC: 50.2 MG/DL (ref 63–159)
LYMPHOCYTES # BLD AUTO: 4.1 K/UL (ref 1–4.8)
LYMPHOCYTES NFR BLD: 34.4 % (ref 18–48)
MCH RBC QN AUTO: 33.1 PG (ref 27–31)
MCHC RBC AUTO-ENTMCNC: 33 G/DL (ref 32–36)
MCV RBC AUTO: 100 FL (ref 82–98)
MONOCYTES # BLD AUTO: 0.7 K/UL (ref 0.3–1)
MONOCYTES NFR BLD: 5.8 % (ref 4–15)
NEUTROPHILS # BLD AUTO: 6.8 K/UL (ref 1.8–7.7)
NEUTROPHILS NFR BLD: 57 % (ref 38–73)
NONHDLC SERPL-MCNC: 87 MG/DL
NRBC BLD-RTO: 0 /100 WBC
PLATELET # BLD AUTO: 232 K/UL (ref 150–450)
PMV BLD AUTO: 9.9 FL (ref 9.2–12.9)
POTASSIUM SERPL-SCNC: 4.2 MMOL/L (ref 3.5–5.1)
PROT SERPL-MCNC: 7.7 G/DL (ref 6–8.4)
RBC # BLD AUTO: 3.35 M/UL (ref 4.6–6.2)
RETICS/RBC NFR AUTO: 1.6 % (ref 0.4–2)
SATURATED IRON: 50 % (ref 20–50)
SODIUM SERPL-SCNC: 136 MMOL/L (ref 136–145)
TOTAL IRON BINDING CAPACITY: 327 UG/DL (ref 250–450)
TRANSFERRIN SERPL-MCNC: 221 MG/DL (ref 200–375)
TRIGL SERPL-MCNC: 184 MG/DL (ref 30–150)
TSH SERPL DL<=0.005 MIU/L-ACNC: 2.46 UIU/ML (ref 0.4–4)
WBC # BLD AUTO: 11.97 K/UL (ref 3.9–12.7)

## 2023-10-11 PROCEDURE — 84165 PROTEIN E-PHORESIS SERUM: CPT | Performed by: INTERNAL MEDICINE

## 2023-10-11 PROCEDURE — 99999 PR PBB SHADOW E&M-EST. PATIENT-LVL III: ICD-10-PCS | Mod: PBBFAC,,, | Performed by: INTERNAL MEDICINE

## 2023-10-11 PROCEDURE — 1101F PT FALLS ASSESS-DOCD LE1/YR: CPT | Mod: CPTII,S$GLB,, | Performed by: INTERNAL MEDICINE

## 2023-10-11 PROCEDURE — 3078F PR MOST RECENT DIASTOLIC BLOOD PRESSURE < 80 MM HG: ICD-10-PCS | Mod: CPTII,S$GLB,, | Performed by: INTERNAL MEDICINE

## 2023-10-11 PROCEDURE — 80053 COMPREHEN METABOLIC PANEL: CPT | Performed by: INTERNAL MEDICINE

## 2023-10-11 PROCEDURE — 36415 COLL VENOUS BLD VENIPUNCTURE: CPT | Performed by: INTERNAL MEDICINE

## 2023-10-11 PROCEDURE — 1101F PR PT FALLS ASSESS DOC 0-1 FALLS W/OUT INJ PAST YR: ICD-10-PCS | Mod: CPTII,S$GLB,, | Performed by: INTERNAL MEDICINE

## 2023-10-11 PROCEDURE — 1126F PR PAIN SEVERITY QUANTIFIED, NO PAIN PRESENT: ICD-10-PCS | Mod: CPTII,S$GLB,, | Performed by: INTERNAL MEDICINE

## 2023-10-11 PROCEDURE — 1159F PR MEDICATION LIST DOCUMENTED IN MEDICAL RECORD: ICD-10-PCS | Mod: CPTII,S$GLB,, | Performed by: INTERNAL MEDICINE

## 2023-10-11 PROCEDURE — 85045 AUTOMATED RETICULOCYTE COUNT: CPT | Performed by: INTERNAL MEDICINE

## 2023-10-11 PROCEDURE — 80061 LIPID PANEL: CPT | Performed by: INTERNAL MEDICINE

## 2023-10-11 PROCEDURE — 1159F MED LIST DOCD IN RCRD: CPT | Mod: CPTII,S$GLB,, | Performed by: INTERNAL MEDICINE

## 2023-10-11 PROCEDURE — 99397 PR PREVENTIVE VISIT,EST,65 & OVER: ICD-10-PCS | Mod: S$GLB,,, | Performed by: INTERNAL MEDICINE

## 2023-10-11 PROCEDURE — 84443 ASSAY THYROID STIM HORMONE: CPT | Performed by: INTERNAL MEDICINE

## 2023-10-11 PROCEDURE — 1126F AMNT PAIN NOTED NONE PRSNT: CPT | Mod: CPTII,S$GLB,, | Performed by: INTERNAL MEDICINE

## 2023-10-11 PROCEDURE — 84466 ASSAY OF TRANSFERRIN: CPT | Performed by: INTERNAL MEDICINE

## 2023-10-11 PROCEDURE — 3075F SYST BP GE 130 - 139MM HG: CPT | Mod: CPTII,S$GLB,, | Performed by: INTERNAL MEDICINE

## 2023-10-11 PROCEDURE — 82728 ASSAY OF FERRITIN: CPT | Performed by: INTERNAL MEDICINE

## 2023-10-11 PROCEDURE — 3288F FALL RISK ASSESSMENT DOCD: CPT | Mod: CPTII,S$GLB,, | Performed by: INTERNAL MEDICINE

## 2023-10-11 PROCEDURE — 3288F PR FALLS RISK ASSESSMENT DOCUMENTED: ICD-10-PCS | Mod: CPTII,S$GLB,, | Performed by: INTERNAL MEDICINE

## 2023-10-11 PROCEDURE — 1160F PR REVIEW ALL MEDS BY PRESCRIBER/CLIN PHARMACIST DOCUMENTED: ICD-10-PCS | Mod: CPTII,S$GLB,, | Performed by: INTERNAL MEDICINE

## 2023-10-11 PROCEDURE — 99999 PR PBB SHADOW E&M-EST. PATIENT-LVL III: CPT | Mod: PBBFAC,,, | Performed by: INTERNAL MEDICINE

## 2023-10-11 PROCEDURE — 85025 COMPLETE CBC W/AUTO DIFF WBC: CPT | Performed by: INTERNAL MEDICINE

## 2023-10-11 PROCEDURE — 99397 PER PM REEVAL EST PAT 65+ YR: CPT | Mod: S$GLB,,, | Performed by: INTERNAL MEDICINE

## 2023-10-11 PROCEDURE — 3078F DIAST BP <80 MM HG: CPT | Mod: CPTII,S$GLB,, | Performed by: INTERNAL MEDICINE

## 2023-10-11 PROCEDURE — 83615 LACTATE (LD) (LDH) ENZYME: CPT | Performed by: INTERNAL MEDICINE

## 2023-10-11 PROCEDURE — 83540 ASSAY OF IRON: CPT | Performed by: INTERNAL MEDICINE

## 2023-10-11 PROCEDURE — 84165 PROTEIN E-PHORESIS SERUM: CPT | Mod: 26,,, | Performed by: PATHOLOGY

## 2023-10-11 PROCEDURE — 3075F PR MOST RECENT SYSTOLIC BLOOD PRESS GE 130-139MM HG: ICD-10-PCS | Mod: CPTII,S$GLB,, | Performed by: INTERNAL MEDICINE

## 2023-10-11 PROCEDURE — 83036 HEMOGLOBIN GLYCOSYLATED A1C: CPT | Performed by: INTERNAL MEDICINE

## 2023-10-11 PROCEDURE — 84165 PATHOLOGIST INTERPRETATION SPE: ICD-10-PCS | Mod: 26,,, | Performed by: PATHOLOGY

## 2023-10-11 PROCEDURE — 1160F RVW MEDS BY RX/DR IN RCRD: CPT | Mod: CPTII,S$GLB,, | Performed by: INTERNAL MEDICINE

## 2023-10-11 NOTE — PROGRESS NOTES
"Subjective:       Patient ID: Rico Park Jr. is a 78 y.o. male.    Chief Complaint: Annual Exam    Here for annual exam        Hx of macrocytic anemia. Followed by Dr Sanabria. Last rec was for BM biopsy . Pt deferred due to being asymptomatic and is monitoring. He denies night sweats, unexplained weight loss, easy bruising/bleeding, recurrent infection, bone pains, malaise.      He exercises regularly and stamina is fine. Denies CP at rest or with exertion, dizziness with exertion, shortness of breath out of proportion to level of activity, frequent or sustained palpitations, orthopnea, PND, or LE edema.      Pt is tolerating statin without frequent muscle cramps or diffuse myalgias or weakness.             Review of Systems   Constitutional:  Negative for appetite change, chills, fever and unexpected weight change.   HENT:  Negative for hearing loss, sore throat and trouble swallowing.    Eyes:  Negative for visual disturbance.   Respiratory:  Negative for cough, chest tightness and shortness of breath.    Cardiovascular:  Negative for chest pain and leg swelling.   Gastrointestinal:  Negative for abdominal pain, blood in stool, constipation, diarrhea, nausea and vomiting.   Endocrine: Negative for polydipsia and polyuria.   Genitourinary:  Negative for decreased urine volume, difficulty urinating, dysuria, frequency and urgency.   Musculoskeletal:  Negative for gait problem.   Skin:  Negative for rash.   Neurological:  Negative for dizziness and numbness.   Psychiatric/Behavioral:  The patient is not nervous/anxious.        Objective:      Vitals:    10/11/23 1526 10/11/23 1557   BP: (!) 150/72 130/70   BP Location: Left arm    Patient Position: Sitting    Pulse: (!) 58    SpO2: 98%    Weight: 62 kg (136 lb 11 oz)    Height: 5' 6" (1.676 m)       Physical Exam  Vitals and nursing note reviewed.   Constitutional:       General: He is not in acute distress.     Appearance: Normal appearance. He is " well-developed.   HENT:      Head: Normocephalic and atraumatic.      Mouth/Throat:      Pharynx: No oropharyngeal exudate.   Eyes:      General: No scleral icterus.     Conjunctiva/sclera: Conjunctivae normal.      Pupils: Pupils are equal, round, and reactive to light.   Neck:      Thyroid: No thyromegaly.   Cardiovascular:      Rate and Rhythm: Normal rate and regular rhythm.      Heart sounds: Normal heart sounds. No murmur heard.  Pulmonary:      Effort: Pulmonary effort is normal.      Breath sounds: Normal breath sounds. No wheezing or rales.   Abdominal:      General: There is no distension.   Musculoskeletal:         General: No tenderness.   Lymphadenopathy:      Cervical: No cervical adenopathy.   Skin:     General: Skin is warm and dry.   Neurological:      Mental Status: He is alert and oriented to person, place, and time.   Psychiatric:         Behavior: Behavior normal.         Assessment:       1. Annual physical exam    2. Benign essential HTN    3. Hyperlipidemia, unspecified hyperlipidemia type    4. Elevated ferritin    5. Macrocytic anemia    6. Abnormal finding of blood chemistry, unspecified    7. Changes in skin texture        Plan:       Rico was seen today for annual exam.    Diagnoses and all orders for this visit:    Annual physical exam  -     Comprehensive Metabolic Panel; Future  -     Lipid Panel; Future  -     TSH; Future  -     CBC Auto Differential; Future  -     Hemoglobin A1C; Future    Benign essential HTN    Hyperlipidemia, unspecified hyperlipidemia type    Elevated ferritin  -     Reticulocytes; Future  -     Ferritin; Future  -     Iron and TIBC; Future  -     Lactate Dehydrogenase; Future  -     PROTEIN ELECTROPHORESIS, SERUM; Future    Macrocytic anemia  -     Reticulocytes; Future  -     Ferritin; Future  -     Iron and TIBC; Future  -     Lactate Dehydrogenase; Future  -     PROTEIN ELECTROPHORESIS, SERUM; Future    Abnormal finding of blood chemistry, unspecified  -      Lipid Panel; Future  -     Hemoglobin A1C; Future    Changes in skin texture  -     TSH; Future  -     CBC Auto Differential; Future         RTC in 1 year or sooner prn     Osmar Pfeiffer MD  Internal Medicine[facility]        Side effects of medication(s) were discussed in detail and patient voiced understanding.  Patient will call back for any issues or complications.

## 2023-10-12 LAB
ALBUMIN SERPL ELPH-MCNC: 4.18 G/DL (ref 3.35–5.55)
ALPHA1 GLOB SERPL ELPH-MCNC: 0.27 G/DL (ref 0.17–0.41)
ALPHA2 GLOB SERPL ELPH-MCNC: 0.8 G/DL (ref 0.43–0.99)
B-GLOBULIN SERPL ELPH-MCNC: 0.82 G/DL (ref 0.5–1.1)
GAMMA GLOB SERPL ELPH-MCNC: 1.13 G/DL (ref 0.67–1.58)
PROT SERPL-MCNC: 7.2 G/DL (ref 6–8.4)

## 2023-10-12 NOTE — PROGRESS NOTES
A little up from your baseline but not worrisome. Steady as she goes. Please let me know if you need anything.    Respectfully,  Fritz Pfeiffer

## 2023-10-14 LAB — PATHOLOGIST INTERPRETATION SPE: NORMAL

## 2024-01-29 ENCOUNTER — PATIENT MESSAGE (OUTPATIENT)
Dept: INTERNAL MEDICINE | Facility: CLINIC | Age: 79
End: 2024-01-29
Payer: MEDICARE

## 2024-01-29 DIAGNOSIS — M54.50 RECURRENT LOW BACK PAIN: Primary | ICD-10-CM

## 2024-02-05 DIAGNOSIS — M10.9 ACUTE GOUT OF FOOT, UNSPECIFIED CAUSE, UNSPECIFIED LATERALITY: ICD-10-CM

## 2024-02-05 RX ORDER — GABAPENTIN 300 MG/1
300 CAPSULE ORAL 2 TIMES DAILY
Qty: 90 CAPSULE | Refills: 1 | Status: SHIPPED | OUTPATIENT
Start: 2024-02-05 | End: 2024-02-16 | Stop reason: SDUPTHER

## 2024-02-05 NOTE — TELEPHONE ENCOUNTER
Care Due:                  Date            Visit Type   Department     Provider  --------------------------------------------------------------------------------                                MYCHART                              ANNUAL                              CHECKUP/PHY  [facility] INTERNAL  Last Visit: 10-      [facility]       Osmar Stokes  Next Visit: None Scheduled  None         None Found                                                            Last  Test          Frequency    Reason                     Performed    Due Date  --------------------------------------------------------------------------------    Uric Acid...  12 months..  allopurinoL..............  Not Found    Overdue    [employer] Embedded Care Due Messages. Reference number: [id number].   2/05/2024 12:44:52 PM CST

## 2024-02-15 ENCOUNTER — PATIENT MESSAGE (OUTPATIENT)
Dept: INTERNAL MEDICINE | Facility: CLINIC | Age: 79
End: 2024-02-15
Payer: MEDICARE

## 2024-02-15 DIAGNOSIS — M10.9 ACUTE GOUT OF FOOT, UNSPECIFIED CAUSE, UNSPECIFIED LATERALITY: ICD-10-CM

## 2024-02-16 RX ORDER — GABAPENTIN 300 MG/1
300 CAPSULE ORAL 2 TIMES DAILY
Qty: 180 CAPSULE | Refills: 0 | Status: SHIPPED | OUTPATIENT
Start: 2024-02-16 | End: 2024-02-26 | Stop reason: SDUPTHER

## 2024-02-16 NOTE — TELEPHONE ENCOUNTER
Refill Routing Note   Medication(s) are not appropriate for processing by [facility] for the following reason(s):        Outside of protocol    [facility] action(s):  Route               Appointments  past 12m or future 3m with PCP    Date Provider   Last Visit   10/11/2023 Osmar Stokes MD   Next Visit   Visit date not found sOmar Stokes MD   ED visits in past 90 days: 0        Note composed:8:53 AM 02/16/2024

## 2024-02-16 NOTE — TELEPHONE ENCOUNTER
No care due was identified.  Health system Embedded Care Due Messages. Reference number: [id number].   2/16/2024 8:45:57 AM CST

## 2024-02-16 NOTE — TELEPHONE ENCOUNTER
Pt need medication sent to diff pharm. [facility] instead of [facility], [place]

## 2024-02-18 DIAGNOSIS — E78.5 HYPERLIPIDEMIA, UNSPECIFIED HYPERLIPIDEMIA TYPE: ICD-10-CM

## 2024-02-18 DIAGNOSIS — I10 BENIGN ESSENTIAL HTN: ICD-10-CM

## 2024-02-18 RX ORDER — ATORVASTATIN CALCIUM 40 MG/1
40 TABLET, FILM COATED ORAL
Qty: 90 TABLET | Refills: 2 | Status: SHIPPED | OUTPATIENT
Start: 2024-02-18 | End: 2024-05-20 | Stop reason: SDUPTHER

## 2024-02-18 RX ORDER — LOSARTAN POTASSIUM 100 MG/1
100 TABLET ORAL
Qty: 90 TABLET | Refills: 2 | Status: SHIPPED | OUTPATIENT
Start: 2024-02-18 | End: 2024-05-20 | Stop reason: SDUPTHER

## 2024-02-18 NOTE — TELEPHONE ENCOUNTER
Rico Park  is requesting a refill authorization.  Brief Assessment and Rationale for Refill:  Approve     Medication Therapy Plan:         Comments:     Note composed:2:25 PM 02/18/2024

## 2024-02-18 NOTE — TELEPHONE ENCOUNTER
No care due was identified.  [employer] Embedded Care Due Messages. Reference number: [id number].   2/18/2024 12:59:27 PM CST

## 2024-02-18 NOTE — TELEPHONE ENCOUNTER
Rcio Park  is requesting a refill authorization.  Brief Assessment and Rationale for Refill:  Approve     Medication Therapy Plan:         Comments:     Note composed:4:08 PM 02/18/2024

## 2024-02-18 NOTE — TELEPHONE ENCOUNTER
No care due was identified.  [facility] Embedded Care Due Messages. Reference number: [id number].   2/18/2024 2:51:22 PM CST

## 2024-02-26 DIAGNOSIS — M10.9 ACUTE GOUT OF FOOT, UNSPECIFIED CAUSE, UNSPECIFIED LATERALITY: ICD-10-CM

## 2024-02-26 NOTE — TELEPHONE ENCOUNTER
No care due was identified.  Health [facility] Embedded Care Due Messages. Reference number: [id number].   2/26/2024 10:14:49 AM CST

## 2024-02-26 NOTE — TELEPHONE ENCOUNTER
Refill Routing Note   Medication(s) are not appropriate for processing by [facility] for the following reason(s):        Outside of protocol    [facility] action(s):  Route               Appointments  past 12m or future 3m with PCP    Date Provider   Last Visit   10/11/2023 Osmar Stokes MD   Next Visit   Visit date not found Osmar Stokes MD   ED visits in past 90 days: 0        Note composed:12:03 PM 02/26/2024

## 2024-02-27 RX ORDER — GABAPENTIN 300 MG/1
300 CAPSULE ORAL 2 TIMES DAILY
Qty: 180 CAPSULE | Refills: 0 | Status: SHIPPED | OUTPATIENT
Start: 2024-02-27

## 2024-05-16 ENCOUNTER — TELEPHONE (OUTPATIENT)
Dept: INTERNAL MEDICINE | Facility: CLINIC | Age: 79
End: 2024-05-16
Payer: MEDICARE

## 2024-05-16 DIAGNOSIS — M54.50 RECURRENT LOW BACK PAIN: Primary | ICD-10-CM

## 2024-05-16 NOTE — TELEPHONE ENCOUNTER
----- Message from Silvia Garcia sent at 5/16/2024 12:46 PM CDT -----  Regarding: call back  Type: Patient Call Back    Who called: pt     What is the request in detail: requesting a call to see if [facility] [employer] has reached out to provider regarding a referral to [facility]     Can the clinic reply by [facility]?no    Would the patient rather a call back or a response via [facility]? call    Best call back number: [phone]      Additional Information:

## 2024-05-20 ENCOUNTER — PATIENT MESSAGE (OUTPATIENT)
Dept: INTERNAL MEDICINE | Facility: CLINIC | Age: 79
End: 2024-05-20
Payer: MEDICARE

## 2024-05-20 DIAGNOSIS — E78.5 HYPERLIPIDEMIA, UNSPECIFIED HYPERLIPIDEMIA TYPE: ICD-10-CM

## 2024-05-20 DIAGNOSIS — I10 BENIGN ESSENTIAL HTN: ICD-10-CM

## 2024-05-20 DIAGNOSIS — M10.9 ACUTE GOUT OF FOOT, UNSPECIFIED CAUSE, UNSPECIFIED LATERALITY: ICD-10-CM

## 2024-05-20 NOTE — TELEPHONE ENCOUNTER
Care Due:                  Date            Visit Type   Department     Provider  --------------------------------------------------------------------------------                                MYCHART                              ANNUAL                              CHECKUP/PHY  [facility] INTERNAL  Last Visit: 10-      [facility]       Osmar Stokes  Next Visit: None Scheduled  None         None Found                                                            Last  Test          Frequency    Reason                     Performed    Due Date  --------------------------------------------------------------------------------    Uric Acid...  12 months..  allopurinoL..............  Not Found    Overdue    [employer] Embedded Care Due Messages. Reference number: [id number].   5/20/2024 1:20:17 PM CDT

## 2024-05-21 RX ORDER — LOSARTAN POTASSIUM 100 MG/1
100 TABLET ORAL DAILY
Qty: 90 TABLET | Refills: 1 | Status: SHIPPED | OUTPATIENT
Start: 2024-05-21

## 2024-05-21 RX ORDER — AMLODIPINE BESYLATE 5 MG/1
5 TABLET ORAL DAILY
Qty: 90 TABLET | Refills: 1 | Status: SHIPPED | OUTPATIENT
Start: 2024-05-21

## 2024-05-21 RX ORDER — ALLOPURINOL 300 MG/1
300 TABLET ORAL DAILY
Qty: 90 TABLET | Refills: 2 | Status: SHIPPED | OUTPATIENT
Start: 2024-05-21

## 2024-05-21 RX ORDER — ATORVASTATIN CALCIUM 40 MG/1
40 TABLET, FILM COATED ORAL DAILY
Qty: 90 TABLET | Refills: 1 | Status: SHIPPED | OUTPATIENT
Start: 2024-05-21

## 2024-05-21 NOTE — TELEPHONE ENCOUNTER
Refill Routing Note   Medication(s) are not appropriate for processing by [facility] for the following reason(s):        Required labs outdated    [facility] action(s):  Approve  Defer     Requires labs : Yes      Medication Therapy Plan: Uric acid      Appointments  past 12m or future 3m with PCP    Date Provider   Last Visit   10/11/2023 Osmar Stokes MD   Next Visit   Visit date not found Osmar Stokes MD   ED visits in past 90 days: 0        Note composed:10:02 AM 05/21/2024

## 2024-05-28 DIAGNOSIS — Z00.00 ENCOUNTER FOR MEDICARE ANNUAL WELLNESS EXAM: ICD-10-CM

## 2024-06-06 NOTE — PROGRESS NOTES
"[facility]  Physical Therapy Lumbar Evaluation      Name: Rico Park Jr.  Clinic Number: [id number]    Therapy Diagnosis:   Encounter Diagnoses   Name Primary?    Recurrent low back pain     Chronic midline low back pain without sciatica Yes     Physician: Osmar Stokes MD    Physician Orders: PT Eval and Treat  Medical Diagnosis from Referral: M54.50 (ICD-10-CM) - Recurrent low back pain  Evaluation Date: 6/10/2024  Authorization Period Expiration: 5/20/2024  Plan of Care Expiration: 8/19/2024  Reassessment Due: 7/10/2024  Visit # / Visits authorized: 1/1 (pending) ([place])  MedX testing visit 2    Time In: 9:04 AM  Time Out: 10:02 AM  Total Billable Time: 58 minutes  INSURANCE and OUTCOMES: Fee for Service with FOTO Outcomes 1/3    Precautions: standard    Pattern of pain determined: 1PEP    Subjective   Date of onset: ~2019  History of current condition: Rico reports that roughly five years ago he had an insidious onset of low back pain that was treated unsuccessfully with physical therapy, and then more successfully with Gabapentin. Currently, he complains of intermittent midline low back pain that arises maybe once per week and only when running. The current episode of low back pain arose about 18 months ago, and patient doesn't feel that anything caused it. He notes that he has had a few "tripping" falls when running, but doesn't feel that it's related to the onset of his most recent low back pain.      Medical History:   Past Medical History:   Diagnosis Date    Cataract      Surgical History:   Rico Park Jr.  has a past surgical history that includes TONSILLECTOMY, ADENOIDECTOMY; Cataract extraction; and Colonoscopy (N/A, 2/23/2022).    Medications:   Rico has a current medication list which includes the following prescription(s): allopurinol, amlodipine, atorvastatin, diphth,pertus(acell),tetanus, gabapentin, ibuprofen, losartan, and " "pneumoc 13-zahida conj-dip cr(pf).    Allergies:   Review of patient's allergies indicates:   Allergen Reactions    Pcn [penicillins] Rash        Imaging: X-ray Lumbar spine 7/7/2016  Findings: 5 views. There is a moderate levoscoliosis of lumbar spine. There is 0.8 cm of left lateral translation of L3 in relation to L4. There is grade 1 retrolisthesis of L2 in relation to L3. There is loss of disc space height with degenerative endplate change and facet hypertrophy throughout the lumbar spine most pronounced at L2-3. There is no instability on flexion extension. There is no fracture or dislocation. There is calcification of the aorta     Prior Therapy: ~2016-19 [facility]  Prior Treatment: Nothing other than Gabapentin  Social History: 2-story home, lives with spouse, bedroom upstairs  Occupation:   Leisure: Run 1 mile/day, read       Prior Level of Function: Independent  Current Level of Function: Independent  DME owned/used: None   Gym Membership: Yes, [facility] [employer]    Pain:  Current 0/10, worst 4/10, best 0/10   Location: Midline back   Description: Aching, Dull, and Tight  Aggravating Factors: Only running (intermittently)  Easing Factors: pain medication  Disturbed Sleep: No    Pattern of pain questions:  1.  Where is your pain the worst? No  2.  Is your pain constant or intermittent? Intermittent  3.  Does bending forward make your typical pain worse? No  4.  Since the start of your back pain, has there been a change in your bowel or bladder? No  5.  What can't you do now that you use to be able to do? Running without pain    Pts goals: "To get rid of the pain when I run, and get tools to correct my posture".     Red Flag Screening:   Cough/Sneeze Strain: (--)  Bladder/Bowel: (--)  Falls: (+) (only while tripping when running- 4 over the last 10 years)  Night pain: (--)  Unexplained weight loss: (--)  General health: "Good"    Objective      Postural " examination/scapula alignment: Rounded shoulder, Head forward, Affected scapula abducted, Affected scapula downwardly rotated, Lateral weight shift of hips, Abnormal trunk flexion, Slouched posture, Trunk deviated right, and Increased kyphosis  Joint integrity: Firm end feeling  Skin integrity:WNL   Edema: None  Correction of posture: better with lumbar roll  Palpation: No TTP  Standing: Right lateral shift, left torso side bending & rotation, left cervical side bending     MOVEMENT LOSS - Lumbar   Norms ROM Loss Initial   Flexion Fingers touch toes, sacral angle >/= 70 deg, uniform spinal curvature, posterior weight shift  major loss   Extension ASIS surpasses toes, spine of scapulae surpasses heels, uniform spinal curve major loss   Side glide Right  major loss   Side glide Left  major loss   Rotation Right PT observes contralateral shoulder major loss   Rotation Left PT observes contralateral shoulder major loss     Lower Extremity Strength  Right LE  Left LE    Hip flexion: 4-/5 Hip flexion: 4/5   Hip extension:  3+/5 Hip extension: 3+/5   Hip abduction: 3+/5 Hip abduction: 4-/5   Hip adduction:  4/5 Hip adduction:  4/5   Hip External Rotation 3+/5 Hip External Rotation 3+/5   Hip Internal rotation   3+/5 Hip Internal rotation 4-/5   Knee Flexion 4/5 Knee Flexion 4/5   Knee Extension 4/5 Knee Extension 4/5   Ankle dorsiflexion: 4-/5 Ankle dorsiflexion: 3+/5   Ankle plantarflexion: 4-/5 Ankle plantarflexion: 4/5     GAIT:  Assistive Device used: none  Level of Assistance: independent  Patient displays the following gait deviations: decreased weight shift and early toe off.     Special Tests:   Test Name  Test Result   Prone Instability Test (--)   SI Joint Provocation Test (--)   Straight Leg Raise (--)   Neural Tension Test (--)   Crossed Straight Leg Raise (--)   Walking on toes Able   Walking on heels  Able   Leg Lowerin degrees    NEUROLOGICAL SCREENING:     Sensory deficits: All LQ dermatomes WFL  "    Reflexes:    Left Right   Patella Tendon 1+ absent   Achilles Tendon 1+ 1+   Babinski  (--) (--)   Clonus (--) (--)     REPEATED TEST MOVEMENTS:    Baseline symptoms:  Repeated Flexion in Standing no effect   Repeated Extension in Standing no effect   Repeated Flexion in lying no effect   Repeated Extension in lying  no worse       STATIC TESTS and other movements:   Prone lie no effect   Prone lie on elbows no effect   Sitting slouched  no effect   Sitting erect no effect   Standing slouched no effect   Standing erect  no effect   Lying prone in extension  no effect   Long sitting   no effect   Sustained flexion no better   Sustained prone using mat no worse     Lumbar testing Visit 2    OUTCOMES SELECTION:   Intake Outcome Measure for FOTO Lumbar Survey    Therapist reviewed FOTO scores for Rico Park Jr. on 6/10/2024.   FOTO documents entered into [employer] - see Media section.    Intake Score: 94% functional ability  Goal Score: 89% functional ability        Treatment     Total Treatment time separate from Evaluation: 10 minutes    Rico received therapeutic exercises to develop/improve posture, lumbar ROM, strength, and muscular endurance for 2 minutes including the following exercises:     EILx 5  PPT x 5" x 5  Supine 90-90 marching x 10  Bridges x 5    Written Home Exercises Provided: yes.    HEP AS FOLLOWS:  EIL  PPT  Supine 90-90 marching  Bridges    Exercises were reviewed and Rico was able to demonstrate them prior to the end of the session. Rico demonstrated fair  understanding of the education provided.     See EMR under Patient Instructions for exercises provided prior visit.    Rico received the following manual therapy techniques: Joint mobilizations, Manual traction, Myofacial release, Manual Lymphatic Drainage, Soft tissue Mobilization, and Friction Massage were applied to the: - for 0 minutes.     MedX Testing:  MedX testing to be performed next visit    Therapeutic " Education/Activity provided for 8 minutes:   - Patient was given an [facility] Visit 1 handout which discusses the following:  - what to expect in therapy  - an overview of the program, including health coaching and wellness  - importance of spinal hygiene, proper posture, lifting mechanics, sleep quality, and nutrition/hydration   - Albina roll trialed, recommended, and purchase information was provided.  - Patient received a handout regarding anticipated muscular soreness following the isometric test and strategies for management were reviewed with patient including stretching, using ice and scheduled rest.   - Patient received verbal education on the following:   - [facility] program   - purpose of the isometric test  - safe progression of lumbar strengthening, wellness approach, and systemic strengthening.   - safe usage of MedX machine and testing protocols.    Rico received cold pack for 0 minutes to lumbar spine in [place].    Assessment     Rico is a 78 y.o. male referred to [facility] with a medical diagnosis of M54.50 (ICD-10-CM) - Recurrent low back pain. Pt presents with low back pain that began approximately 5 years ago without any mechanism of injury. He states that at the time of initial onset he was treated with physical therapy unsuccessfully, however employment of Gabapentin reduced his symptoms. After symptoms resolved his low back pain returned 18 months ago while running. Patient says that low back pain is midline and only intermittently arises when running, about 1x/week. Physical examination reveals grossly limited lumbar range of motion in all planes; postural abnormalities including a right lateral weight shift of hips, abnormal trunk flexion, slouched posture, and trunk deviated right; globally limited bilateral hip extension, internal/external hip rotation and bilateral dorsiflexion MMT strength; and a Leg Lowering test that is outside of functional core  stability limits. Patient's impairments affect his ability to perform recreational fitness activities in a pain free manner. Given the severity of the patient's low back, his past medical history, and overall health for his age, a full recovery is expected within 8-10 weeks. His rehab potential is dependent on compliance with PT recommendations and adherence to his home exercise program. Skilled PT intervention is required to address these key impairments and to provide and progress with an appropriate home exercise program. This evaluation is of low complexity due to the stable nature of the patients presentation as well as the comorbidities and medical factors included in this evaluation.The patient has been educated in the evaluation findings, prognosis, and plan of care, HEP and is in agreement and willing to participate in therapy.     Pain Pattern: 1PEP       Pt prognosis is Good.     Pt will benefit from skilled outpatient Physical Therapy to address the deficits stated above and in the chart below, to provide pt/family education, and to maximize pt's level of independence. Based on the above history and physical examination an active physical therapy program is recommended.      Plan of care discussed with patient: Yes  Pt's spiritual, cultural and educational needs considered and patient is agreeable to the plan of care and goals as stated below:     Anticipated Barriers for therapy: none    PT Evaluation Completed? Yes    Medical necessity is demonstrated by the following problem list:    History  Co-morbidities and personal factors that may impact the plan of care [x] LOW: no personal factors / co-morbidities  [] MODERATE: 1-2 personal factors / co-morbidities  [] HIGH: 3+ personal factors / co-morbidities    Moderate / High Support Documentation:   Co-morbidities affecting plan of care:    Cataract     Personal Factors:   age     Examination  Body Structures and Functions, activity limitations and  participation restrictions that may impact the plan of care [x] LOW: addressing 1-2 elements  [] MODERATE: 3+ elements  [] HIGH: 4+ elements (please support below)    Moderate / High Support Documentation:     Clinical Presentation [x] LOW: stable  [] MODERATE: Evolving  [] HIGH: Unstable     Decision Making/ Complexity Score: low         GOALS: Pt is in agreement with the following goals.    Short term goals:  6 weeks or 10 visits   - Pt will demonstrate increased lumbar MedX ROM by at least 8 degrees from the initial ROM value with improvements noted in functional ROM and ability to perform ADLs. Appropriate and Ongoing  - Pt will demonstrate increased MedX average isometric strength value by 10% from initial test resulting in improved ability to perform bending, lifting, and carrying activities safely, confidently. Appropriate and Ongoing  - Pt will report a reduction in worst pain score by 1-2 points for improved tolerance for recreational running. Appropriate and Ongoing  - Pt able to perform HEP correctly with minimal cueing or supervision from therapist to encourage independent management of symptoms. Appropriate and Ongoing    Long term goals: 10 weeks or 20 visits   - Pt will demonstrate increased lumbar MedX ROM by at least 12 degrees from initial ROM value, resulting in improved ability to perform functional forward bending while standing and sitting. Appropriate and Ongoing  - Pt will demonstrate increased MedX average isometric strength value by 20% from initial test resulting in improved ability to perform bending, lifting, and carrying activities safely and confidently. Appropriate and Ongoing  - Pt to demonstrate ability to independently control and reduce their pain through posture positioning and mechanical movements throughout a typical day. Appropriate and Ongoing  - Pt will demonstrate reduced pain and improved functional outcomes as reported on the FOTO by reaching an intake score of >/= 96%  functional ability in order to demonstrate subjective improvement in patient's condition. . Appropriate and Ongoing  - Pt will demonstrate independence with the HEP at discharge. Appropriate and Ongoing  - Pt will achieve core strength that is within functional limits in order to return to running 1 mile/day with no greater than 1/10 low back provocation. Appropriate and Ongoing    Plan     Outpatient physical therapy 2x week for 10 weeks or 20 visits to include the following:   - Patient education  - Therapeutic exercise  - Manual therapy  - Performance testing   - Neuromuscular Re-education  - Therapeutic activity   - Modalities    Pt may be seen by PTA as part of the rehabilitation team.     Therapist: Jenae Serrato, PT  6/10/2024

## 2024-06-10 ENCOUNTER — CLINICAL SUPPORT (OUTPATIENT)
Dept: REHABILITATION | Facility: OTHER | Age: 79
End: 2024-06-10
Attending: INTERNAL MEDICINE
Payer: MEDICARE

## 2024-06-10 DIAGNOSIS — M54.50 CHRONIC MIDLINE LOW BACK PAIN WITHOUT SCIATICA: Primary | ICD-10-CM

## 2024-06-10 DIAGNOSIS — G89.29 CHRONIC MIDLINE LOW BACK PAIN WITHOUT SCIATICA: Primary | ICD-10-CM

## 2024-06-10 DIAGNOSIS — M54.50 RECURRENT LOW BACK PAIN: ICD-10-CM

## 2024-06-10 PROCEDURE — 97161 PT EVAL LOW COMPLEX 20 MIN: CPT

## 2024-06-10 PROCEDURE — 97530 THERAPEUTIC ACTIVITIES: CPT

## 2024-06-10 NOTE — PLAN OF CARE
"[facility]  Physical Therapy Lumbar Evaluation      Name: Rico Park Jr.  Clinic Number: [id number]    Therapy Diagnosis: No diagnosis found.  Physician: Osmar Stokes MD    Physician Orders: PT Eval and Treat  Medical Diagnosis from Referral: M54.50 (ICD-10-CM) - Recurrent low back pain  Evaluation Date: 6/10/2024  Authorization Period Expiration: 5/20/2024  Plan of Care Expiration: 8/15/2024  Reassessment Due: 7/6/2024  Visit # / Visits authorized: 1/1 (pending) ([employer])  MedX testing visit 2    Time In: 9:04 AM  Time Out: 10:02 AM  Total Billable Time: 58 minutes  INSURANCE and OUTCOMES: Fee for Service with FOTO Outcomes 1/3    Precautions: standard    Pattern of pain determined: 1PEP    Subjective   Date of onset: ~2019  History of current condition: Rico reports that roughly five years ago he had an insidious onset of low back pain that was treated unsuccessfully with physical therapy, and then more successfully with Gabapentin. Currently, he complains of intermittent midline low back pain that arises maybe once per week and only when running. The current episode of low back pain arose about 18 months ago, and patient doesn't feel that anything caused it. He notes that he has had a few "tripping" falls when running, but doesn't feel that it's related to the onset of his most recent low back pain.      Medical History:   Past Medical History:   Diagnosis Date    Cataract      Surgical History:   Rico Park Jr.  has a past surgical history that includes TONSILLECTOMY, ADENOIDECTOMY; Cataract extraction; and Colonoscopy (N/A, 2/23/2022).    Medications:   Rico has a current medication list which includes the following prescription(s): allopurinol, amlodipine, atorvastatin, diphth,pertus(acell),tetanus, gabapentin, ibuprofen, losartan, and pneumoc 13-zahida conj-dip cr(pf).    Allergies:   Review of patient's allergies indicates:   Allergen " "Reactions    Pcn [penicillins] Rash        Imaging: X-ray Lumbar spine 7/7/2016  Findings: 5 views. There is a moderate levoscoliosis of lumbar spine. There is 0.8 cm of left lateral translation of L3 in relation to L4. There is grade 1 retrolisthesis of L2 in relation to L3. There is loss of disc space height with degenerative endplate change and facet hypertrophy throughout the lumbar spine most pronounced at L2-3. There is no instability on flexion extension. There is no fracture or dislocation. There is calcification of the aorta     Prior Therapy: ~2016-19 [facility]  Prior Treatment: Nothing other than Gabapentin  Social History: 2-story home, lives with spouse, bedroom upstairs  Occupation:   Leisure: Run 1 mile/day, read       Prior Level of Function: Independent  Current Level of Function: Independent  DME owned/used: None   Gym Membership: Yes, [facility] [employer]    Pain:  Current 0/10, worst 4/10, best 0/10   Location: Midline back   Description: Aching, Dull, and Tight  Aggravating Factors: Only running (intermittently)  Easing Factors: pain medication  Disturbed Sleep: No    Pattern of pain questions:  1.  Where is your pain the worst? No  2.  Is your pain constant or intermittent? Intermittent  3.  Does bending forward make your typical pain worse? No  4.  Since the start of your back pain, has there been a change in your bowel or bladder? No  5.  What can't you do now that you use to be able to do? Running without pain    Pts goals: "To get rid of the pain when I run, and get tools to correct my posture".     Red Flag Screening:   Cough/Sneeze Strain: (--)  Bladder/Bowel: (--)  Falls: (+) (only while tripping when running- 4 over the last 10 years)  Night pain: (--)  Unexplained weight loss: (--)  General health: "Good"    Objective      Postural examination/scapula alignment: Rounded shoulder, Head forward, Affected scapula abducted, Affected scapula " downwardly rotated, Lateral weight shift of hips, Abnormal trunk flexion, Slouched posture, Trunk deviated right, and Increased kyphosis  Joint integrity: Firm end feeling  Skin integrity:WNL   Edema: None  Correction of posture: better with lumbar roll  Palpation: No TTP  Standing: Right lateral shift, left torso side bending & rotation, left cervical side bending     MOVEMENT LOSS - Lumbar   Norms ROM Loss Initial   Flexion Fingers touch toes, sacral angle >/= 70 deg, uniform spinal curvature, posterior weight shift  major loss   Extension ASIS surpasses toes, spine of scapulae surpasses heels, uniform spinal curve major loss   Side glide Right  major loss   Side glide Left  major loss   Rotation Right PT observes contralateral shoulder major loss   Rotation Left PT observes contralateral shoulder major loss     Lower Extremity Strength  Right LE  Left LE    Hip flexion: 4-/5 Hip flexion: 4/5   Hip extension:  3+/5 Hip extension: 3+/5   Hip abduction: 3+/5 Hip abduction: 4-/5   Hip adduction:  4/5 Hip adduction:  4/5   Hip External Rotation 3+/5 Hip External Rotation 3+/5   Hip Internal rotation   3+/5 Hip Internal rotation 4-/5   Knee Flexion 4/5 Knee Flexion 4/5   Knee Extension 4/5 Knee Extension 4/5   Ankle dorsiflexion: 4-/5 Ankle dorsiflexion: 3+/5   Ankle plantarflexion: 4-/5 Ankle plantarflexion: 4/5     GAIT:  Assistive Device used: none  Level of Assistance: independent  Patient displays the following gait deviations: decreased weight shift and early toe off.     Special Tests:   Test Name  Test Result   Prone Instability Test (--)   SI Joint Provocation Test (--)   Straight Leg Raise (--)   Neural Tension Test (--)   Crossed Straight Leg Raise (--)   Walking on toes Able   Walking on heels  Able   Leg Lowerin degrees    NEUROLOGICAL SCREENING:     Sensory deficits: All LQ dermatomes WFL     Reflexes:    Left Right   Patella Tendon 1+ absent   Achilles Tendon 1+ 1+   Babinski  (--) (--)   Clonus  "(--) (--)     REPEATED TEST MOVEMENTS:    Baseline symptoms:  Repeated Flexion in Standing no effect   Repeated Extension in Standing no effect   Repeated Flexion in lying no effect   Repeated Extension in lying  no worse       STATIC TESTS and other movements:   Prone lie no effect   Prone lie on elbows no effect   Sitting slouched  no effect   Sitting erect no effect   Standing slouched no effect   Standing erect  no effect   Lying prone in extension  no effect   Long sitting   no effect   Sustained flexion no better   Sustained prone using mat no worse     Lumbar testing Visit 2    OUTCOMES SELECTION:   Intake Outcome Measure for FOTO Lumbar Survey    Therapist reviewed FOTO scores for Rico Park Jr. on 6/10/2024.   FOTO documents entered into [employer] - see Media section.    Intake Score: 94% functional ability  Goal Score: 89% functional ability        Treatment     Total Treatment time separate from Evaluation: 10 minutes    Rico received therapeutic exercises to develop/improve posture, lumbar ROM, strength, and muscular endurance for 2 minutes including the following exercises:     EILx 5  PPT x 5" x 5  Supine 90-90 marching x 10  Bridges x 5    Written Home Exercises Provided: yes.    HEP AS FOLLOWS:  EIL  PPT  Supine 90-90 marching  Bridges    Exercises were reviewed and Rico was able to demonstrate them prior to the end of the session. Rico demonstrated fair  understanding of the education provided.     See EMR under Patient Instructions for exercises provided prior visit.    Rico received the following manual therapy techniques: Joint mobilizations, Manual traction, Myofacial release, Manual Lymphatic Drainage, Soft tissue Mobilization, and Friction Massage were applied to the: - for 0 minutes.     MedX Testing:  MedX testing to be performed next visit    Therapeutic Education/Activity provided for 8 minutes:   - Patient was given an [facility] handout which " discusses the following:  - what to expect in therapy  - an overview of the program, including health coaching and wellness  - importance of spinal hygiene, proper posture, lifting mechanics, sleep quality, and nutrition/hydration   - Albina roll trialed, recommended, and purchase information was provided.  - Patient received a handout regarding anticipated muscular soreness following the isometric test and strategies for management were reviewed with patient including stretching, using ice and scheduled rest.   - Patient received verbal education on the following:   - Healthy Back program   - purpose of the isometric test  - safe progression of lumbar strengthening, wellness approach, and systemic strengthening.   - safe usage of MedX machine and testing protocols.    Rico received cold pack for 0 minutes to lumbar spine in Z-lie.    Assessment     Rico is a 78 y.o. male referred to [facility] with a medical diagnosis of M54.50 (ICD-10-CM) - Recurrent low back pain. Pt presents with low back pain that began approximately 5 years ago without any mechanism of injury. He states that at the time of initial onset he was treated with physical therapy unsuccessfully, however employment of Gabapentin reduced his symptoms. After symptoms resolved his low back pain returned 18 months ago while running. Patient says that low back pain is midline and only intermittently arises when running, about 1x/week. Physical examination reveals grossly limited lumbar range of motion in all planes; postural abnormalities including a right lateral weight shift of hips, abnormal trunk flexion, slouched posture, and trunk deviated right; globally limited bilateral hip extension, internal/external hip rotation and bilateral dorsiflexion MMT strength; and a Leg Lowering test that is outside of functional core stability limits. Patient's impairments affect his ability to perform recreational fitness activities in a pain free  manner. Given the severity of the patient's low back, his past medical history, and overall health for his age, a full recovery is expected within 8-10 weeks. His rehab potential is dependent on compliance with PT recommendations and adherence to his home exercise program. Skilled PT intervention is required to address these key impairments and to provide and progress with an appropriate home exercise program. This evaluation is of low complexity due to the stable nature of the patients presentation as well as the comorbidities and medical factors included in this evaluation.The patient has been educated in the evaluation findings, prognosis, and plan of care, HEP and is in agreement and willing to participate in therapy.     Pain Pattern: 1PEP       Pt prognosis is Good.     Pt will benefit from skilled outpatient Physical Therapy to address the deficits stated above and in the chart below, to provide pt/family education, and to maximize pt's level of independence. Based on the above history and physical examination an active physical therapy program is recommended.      Plan of care discussed with patient: Yes  Pt's spiritual, cultural and educational needs considered and patient is agreeable to the plan of care and goals as stated below:     Anticipated Barriers for therapy: none    PT Evaluation Completed? Yes    Medical necessity is demonstrated by the following problem list:    History  Co-morbidities and personal factors that may impact the plan of care [x] LOW: no personal factors / co-morbidities  [] MODERATE: 1-2 personal factors / co-morbidities  [] HIGH: 3+ personal factors / co-morbidities    Moderate / High Support Documentation:   Co-morbidities affecting plan of care:    Cataract     Personal Factors:   age     Examination  Body Structures and Functions, activity limitations and participation restrictions that may impact the plan of care [x] LOW: addressing 1-2 elements  [] MODERATE: 3+ elements  []  HIGH: 4+ elements (please support below)    Moderate / High Support Documentation:     Clinical Presentation [x] LOW: stable  [] MODERATE: Evolving  [] HIGH: Unstable     Decision Making/ Complexity Score: low         GOALS: Pt is in agreement with the following goals.    Short term goals:  6 weeks or 10 visits   - Pt will demonstrate increased lumbar MedX ROM by at least 8 degrees from the initial ROM value with improvements noted in functional ROM and ability to perform ADLs. Appropriate and Ongoing  - Pt will demonstrate increased MedX average isometric strength value by 10% from initial test resulting in improved ability to perform bending, lifting, and carrying activities safely, confidently. Appropriate and Ongoing  - Pt will report a reduction in worst pain score by 1-2 points for improved tolerance for recreational running. Appropriate and Ongoing  - Pt able to perform HEP correctly with minimal cueing or supervision from therapist to encourage independent management of symptoms. Appropriate and Ongoing    Long term goals: 10 weeks or 20 visits   - Pt will demonstrate increased lumbar MedX ROM by at least 12 degrees from initial ROM value, resulting in improved ability to perform functional forward bending while standing and sitting. Appropriate and Ongoing  - Pt will demonstrate increased MedX average isometric strength value by 20% from initial test resulting in improved ability to perform bending, lifting, and carrying activities safely and confidently. Appropriate and Ongoing  - Pt to demonstrate ability to independently control and reduce their pain through posture positioning and mechanical movements throughout a typical day. Appropriate and Ongoing  - Pt will demonstrate reduced pain and improved functional outcomes as reported on the FOTO by reaching an intake score of >/= 96% functional ability in order to demonstrate subjective improvement in patient's condition. . Appropriate and Ongoing  - Pt  will demonstrate independence with the HEP at discharge. Appropriate and Ongoing  - Pt will achieve core strength that is within functional limits in order to return to running 1 mile/day with no greater than 1/10 low back provocation. Appropriate and Ongoing    Plan     Outpatient physical therapy 2x week for 10 weeks or 20 visits to include the following:   - Patient education  - Therapeutic exercise  - Manual therapy  - Performance testing   - Neuromuscular Re-education  - Therapeutic activity   - Modalities    Pt may be seen by PTA as part of the rehabilitation team.     Therapist: Jenae Serrato, PT  6/6/2024

## 2024-06-12 ENCOUNTER — CLINICAL SUPPORT (OUTPATIENT)
Dept: REHABILITATION | Facility: OTHER | Age: 79
End: 2024-06-12
Payer: MEDICARE

## 2024-06-12 DIAGNOSIS — G89.29 CHRONIC MIDLINE LOW BACK PAIN WITHOUT SCIATICA: Primary | ICD-10-CM

## 2024-06-12 DIAGNOSIS — R29.898 DECREASED STRENGTH OF TRUNK AND BACK: ICD-10-CM

## 2024-06-12 DIAGNOSIS — M54.50 CHRONIC MIDLINE LOW BACK PAIN WITHOUT SCIATICA: Primary | ICD-10-CM

## 2024-06-12 PROCEDURE — 97112 NEUROMUSCULAR REEDUCATION: CPT

## 2024-06-12 PROCEDURE — 97110 THERAPEUTIC EXERCISES: CPT

## 2024-06-12 NOTE — PROGRESS NOTES
"[facility]  Physical Therapy Treatment Note     Name: Rico Park Jr.  Clinic Number: [id number]    Therapy Diagnosis:   Encounter Diagnoses   Name Primary?    Chronic midline low back pain without sciatica Yes    Decreased strength of trunk and back      Physician: Osmar Stokes MD    Visit Date: 6/12/2024    Medical Diagnosis from Referral: M54.50 (ICD-10-CM) - Recurrent low back pain  Evaluation Date: 6/10/2024  Authorization Period Expiration: 5/20/2024  Plan of Care Expiration: 8/15/2024  Reassessment Due: 7/6/2024  Visit # / Visits authorized: 2/20 ([employer])  MedX testing visit 2    PTA Visit #: 0/5     Time In: 3:30 pm  Time Out: 4:20 pm  Total Billable Time: 30 minutes  INSURANCE and OUTCOMES: Fee for Service with FOTO Outcomes 1/3    Precautions: standard    Pattern of pain determined: 1 PEP    Subjective     Rico Park Jr. reports no pain at this time, had some increased sensations in his back with his run this morning, but he would not call it pain.      Patient reports tolerating previous visit well  Patient reports their pain to be  0 /10 on a 0-10 scale with 0 being no pain and 10 being the worst pain imaginable.  Pain Location: Midline LB     Occupation:   Leisure: Run 1 mile/day, read     Pt goals:  "To get rid of the pain when I run, and get tools to correct my posture".     Objective      Lumbar  Isometric Testing on Med X equipment: Testing administered by PT    Test Initial Baseline Midpoint Final   Date 6/12/2024     ROM 3-39 deg     Max Peak Torque 95      Min Peak Torque 33      Flex/Ext Ratio 2.9:1     % variance  normative data 64% below     % change from initial test N/A visit 1         Outcomes:  Intake Score: 89%  Visit 6 Score:   Visit 10 Score:   Discharge Score:  Goal Score: 94%     Treatment     Rico received the treatments listed below:        Rico participated in neuromuscular re-education activities to " "improve balance, coordination, proprioception, motor control and/or posture for 15 minutes. The following activities were included:     Medical MedX Treatment as follows:  MedX testing performed day 2: Patient  received neuromuscular education to engage spinal musculature correctly for motor control and engagement of musculature for 15 minutes including the MedX exercise component and practice and standard testing. MedX dynamic exercise and baseline isometric test performed with instructions to guide the patient safely through the testing procedure. Patient instructed to perform isometric test correctly and safely while building to an optimal force with a pain-free effort. Patient also instructed that they should feel support/pressure from MedX restraints but no pain/discomfort, and encouraged to report any pain to therapist. Patient demonstrated appropriate understanding of information and tolerance of test.  Education regarding purpose of test, safety during test given, and reviewed possible more soreness and strategies.       Rico participated in therapeutic exercises to develop strength, endurance, ROM, flexibility, posture, and core stabilization for 30 minutes including:    +LTRs 10x5"  EIL 2x10  PPT 15 x 5"  Supine 90-90 marching x 10  Bridges 15x3"    Peripheral muscle strengthening which included one set of 15-20 repetitions at a slow and controlled 10-13 second per rep pace focused on strengthening supporting musculature in order to improve body mechanics and functional mobility. Patient and therapist focused on proper form during treatment to ensure optimal strengthening of each targeted muscle group.  Machines utilized included:Torso rotation, Leg Ext, Leg Curl, Chest Press, and Rowing  To be added next visit:Triceps, Biceps, Hip Abd, Hip Add, and Leg Press      Rico participated in dynamic functional therapeutic activities to improve functional performance and simulate household and community " activities for -  minutes. The following activities were included:        Rico received manual therapy techniques for -  minutes. The following activities were included:        Pt given cold pack for 5 minutes to low back in z-lie.    Patient Education and Home Exercises     Home exercises include:  EIL, PPT, 90/90 march, bridges  Cardio program (V5): -  Lifting education (V11): -  Posture/Lumbar roll: acquired  Fridge Magnet Discharge handout (date given): -  Equipment at home/gym membership: Yes, [facility] [employer]     Education provided:   - PT role and POC  - HEP    Written Home Exercises Provided: Patient instructed to cont prior HEP.  Exercises were reviewed and Rico was able to demonstrate them prior to the end of the session.  Rico demonstrated good  understanding of the education provided.     See EMR under Patient Instructions for exercises provided prior visit.    Assessment     Rico presents to second healthy back visit reporting no pain, was able to demo HEP with Mod VC for form. Pt was able to tolerate Medical MedX machine well as follows:  MedX testing performed and patient tolerated test well.  Pt was also able to complete half of the peripheral strengthening exercises without increased discomfort and will complete the complete circuit next visit as tolerated.    Patient is making good progress towards established goals.  Pt will continue to benefit from skilled outpatient physical therapy to address the deficits stated in the impairment chart, provide pt/family education and to maximize pt's level of independence in the home and community environment.     Anticipated Barriers for therapy: none  Pt's spiritual, cultural and educational needs considered and pt agreeable to plan of care and goals as stated below:     GOALS: Pt is in agreement with the following goals.     Short term goals:  6 weeks or 10 visits   - Pt will demonstrate increased lumbar MedX ROM by at  least 8 degrees from the initial ROM value with improvements noted in functional ROM and ability to perform ADLs. Appropriate and Ongoing  - Pt will demonstrate increased MedX average isometric strength value by 10% from initial test resulting in improved ability to perform bending, lifting, and carrying activities safely, confidently. Appropriate and Ongoing  - Pt will report a reduction in worst pain score by 1-2 points for improved tolerance for recreational running. Appropriate and Ongoing  - Pt able to perform HEP correctly with minimal cueing or supervision from therapist to encourage independent management of symptoms. Appropriate and Ongoing     Long term goals: 10 weeks or 20 visits   - Pt will demonstrate increased lumbar MedX ROM by at least 12 degrees from initial ROM value, resulting in improved ability to perform functional forward bending while standing and sitting. Appropriate and Ongoing  - Pt will demonstrate increased MedX average isometric strength value by 20% from initial test resulting in improved ability to perform bending, lifting, and carrying activities safely and confidently. Appropriate and Ongoing  - Pt to demonstrate ability to independently control and reduce their pain through posture positioning and mechanical movements throughout a typical day. Appropriate and Ongoing  - Pt will demonstrate reduced pain and improved functional outcomes as reported on the FOTO by reaching an intake score of >/= 96% functional ability in order to demonstrate subjective improvement in patient's condition. . Appropriate and Ongoing  - Pt will demonstrate independence with the HEP at discharge. Appropriate and Ongoing  - Pt will achieve core strength that is within functional limits in order to return to running 1 mile/day with no greater than 1/10 low back provocation. Appropriate and Ongoing    Plan     Continue with established Plan of Care towards established PT goals.     Therapist: Lukas Gaytan  PT  6/12/2024

## 2024-06-18 ENCOUNTER — CLINICAL SUPPORT (OUTPATIENT)
Dept: REHABILITATION | Facility: OTHER | Age: 79
End: 2024-06-18
Payer: MEDICARE

## 2024-06-18 DIAGNOSIS — G89.29 CHRONIC MIDLINE LOW BACK PAIN WITHOUT SCIATICA: Primary | ICD-10-CM

## 2024-06-18 DIAGNOSIS — R29.898 DECREASED STRENGTH OF TRUNK AND BACK: ICD-10-CM

## 2024-06-18 DIAGNOSIS — M54.50 CHRONIC MIDLINE LOW BACK PAIN WITHOUT SCIATICA: Primary | ICD-10-CM

## 2024-06-18 PROCEDURE — 97112 NEUROMUSCULAR REEDUCATION: CPT | Mod: CQ

## 2024-06-18 PROCEDURE — 97110 THERAPEUTIC EXERCISES: CPT | Mod: CQ

## 2024-06-18 NOTE — PROGRESS NOTES
"[facility]  Physical Therapy Treatment Note     Name: Rico Park Jr.  Clinic Number: [id number]    Therapy Diagnosis:   Encounter Diagnoses   Name Primary?    Chronic midline low back pain without sciatica Yes    Decreased strength of trunk and back        Physician: Osmar Stokes MD    Visit Date: 6/18/2024    Medical Diagnosis from Referral: M54.50 (ICD-10-CM) - Recurrent low back pain  Evaluation Date: 6/10/2024  Authorization Period Expiration: 5/20/2024  Plan of Care Expiration: 8/15/2024  Reassessment Due: 7/6/2024  Visit # / Visits authorized: 2/20 ([employer])  MedX testing visit 2    PTA Visit #: 1/5     Time In: 9:00 am   Time Out: 10:00 am  Total Billable Time: 30 minutes (1:1)  INSURANCE and OUTCOMES: Fee for Service with FOTO Outcomes 1/3    Precautions: standard    Pattern of pain determined: 1 PEP    Subjective     Rico Park Jr. Denies having any pain upon entry. He reports good response to last session and consistence with HEP.     Patient reports tolerating previous visit well  Patient reports their pain to be  0 /10 on a 0-10 scale with 0 being no pain and 10 being the worst pain imaginable.  Pain Location: Midline LB     Occupation:   Leisure: Run 1 mile/day, read     Pt goals:  "To get rid of the pain when I run, and get tools to correct my posture".     Objective      Lumbar  Isometric Testing on Med X equipment: Testing administered by PT    Test Initial Baseline Midpoint Final   Date 6/12/2024     ROM 3-39 deg     Max Peak Torque 95      Min Peak Torque 33      Flex/Ext Ratio 2.9:1     % variance  normative data 64% below     % change from initial test N/A visit 1         Outcomes:  Intake Score: 89%  Visit 6 Score:   Visit 10 Score:   Discharge Score:  Goal Score: 94%     Treatment     Rico received the treatments listed below:        Rico participated in neuromuscular re-education activities to improve balance, " "coordination, proprioception, motor control and/or posture for 15 minutes. The following activities were included:     Medical MedX Treatment as follows:  MedX testing performed day 2: Patient  received neuromuscular education to engage spinal musculature correctly for motor control and engagement of musculature for 15 minutes including the MedX exercise component and practice and standard testing. MedX dynamic exercise and baseline isometric test performed with instructions to guide the patient safely through the testing procedure. Patient instructed to perform isometric test correctly and safely while building to an optimal force with a pain-free effort. Patient also instructed that they should feel support/pressure from MedX restraints but no pain/discomfort, and encouraged to report any pain to therapist. Patient demonstrated appropriate understanding of information and tolerance of test.  Education regarding purpose of test, safety during test given, and reviewed possible more soreness and strategies.             6/18/2024     9:22 AM   [facility]   Visit Number 3   VAS Pain Rating 0   Extension in Lying 20   Flexion in Lying 10   Lumbar Weight 48 lbs   Repetitions 20   Rating of Perceived Exertion 1       Rico participated in therapeutic exercises to develop strength, endurance, ROM, flexibility, posture, and core stabilization for 30 minutes including:    +LTRs 10x5"  EIL 2x10  PPT 15 x 5"  Supine 90-90 marching x 10  Bridges 15x3"    Peripheral muscle strengthening which included one set of 15-20 repetitions at a slow and controlled 10-13 second per rep pace focused on strengthening supporting musculature in order to improve body mechanics and functional mobility. Patient and therapist focused on proper form during treatment to ensure optimal strengthening of each targeted muscle group.  Machines utilized included:Torso rotation, Leg Ext, Leg Curl, Chest Press, and Rowing  Triceps, Biceps, Hip " Abd, Hip Add, and Leg Press      Rico participated in dynamic functional therapeutic activities to improve functional performance and simulate household and community activities for -  minutes. The following activities were included:        Rico received manual therapy techniques for -  minutes. The following activities were included:        Pt given cold pack for 5 minutes to low back in z-lie.    Patient Education and Home Exercises     Home exercises include:  EIL, PPT, 90/90 march, bridges  Cardio program (V5): -  Lifting education (V11): -  Posture/Lumbar roll: acquired  Fridge Magnet Discharge handout (date given): -  Equipment at home/gym membership: Yes, [employer] [place] [employer]     Education provided:   - PT role and POC  - HEP    Written Home Exercises Provided: Patient instructed to cont prior HEP.  Exercises were reviewed and Rico was able to demonstrate them prior to the end of the session.  Rico demonstrated good  understanding of the education provided.     See EMR under Patient Instructions for exercises provided prior visit.    Assessment     Rico presents to healthy back visit w/o reports of pain today. He was instructed in therex to improve lumbopelvis healthy for functional mobility, demonstrating controlled movements and techniques.  Medical MedX lumbar completed with resistance at 48 lbs/ft , achieving 20 reps at 1/10 RPE. Pt was also able to complete full peripheral strengthening exercises without increased discomfort. Continue program with progression added as tolerated.     Patient is making good progress towards established goals.  Pt will continue to benefit from skilled outpatient physical therapy to address the deficits stated in the impairment chart, provide pt/family education and to maximize pt's level of independence in the home and community environment.     Anticipated Barriers for therapy: none  Pt's spiritual, cultural and educational needs  considered and pt agreeable to plan of care and goals as stated below:     GOALS: Pt is in agreement with the following goals.     Short term goals:  6 weeks or 10 visits   - Pt will demonstrate increased lumbar MedX ROM by at least 8 degrees from the initial ROM value with improvements noted in functional ROM and ability to perform ADLs. Appropriate and Ongoing  - Pt will demonstrate increased MedX average isometric strength value by 10% from initial test resulting in improved ability to perform bending, lifting, and carrying activities safely, confidently. Appropriate and Ongoing  - Pt will report a reduction in worst pain score by 1-2 points for improved tolerance for recreational running. Appropriate and Ongoing  - Pt able to perform HEP correctly with minimal cueing or supervision from therapist to encourage independent management of symptoms. Appropriate and Ongoing     Long term goals: 10 weeks or 20 visits   - Pt will demonstrate increased lumbar MedX ROM by at least 12 degrees from initial ROM value, resulting in improved ability to perform functional forward bending while standing and sitting. Appropriate and Ongoing  - Pt will demonstrate increased MedX average isometric strength value by 20% from initial test resulting in improved ability to perform bending, lifting, and carrying activities safely and confidently. Appropriate and Ongoing  - Pt to demonstrate ability to independently control and reduce their pain through posture positioning and mechanical movements throughout a typical day. Appropriate and Ongoing  - Pt will demonstrate reduced pain and improved functional outcomes as reported on the FOTO by reaching an intake score of >/= 96% functional ability in order to demonstrate subjective improvement in patient's condition. . Appropriate and Ongoing  - Pt will demonstrate independence with the HEP at discharge. Appropriate and Ongoing  - Pt will achieve core strength that is within functional  limits in order to return to running 1 mile/day with no greater than 1/10 low back provocation. Appropriate and Ongoing    Plan     Continue with established Plan of Care towards established PT goals.     Therapist: Jessi Kearney, PTA  6/18/2024

## 2024-06-24 ENCOUNTER — CLINICAL SUPPORT (OUTPATIENT)
Dept: REHABILITATION | Facility: OTHER | Age: 79
End: 2024-06-24
Payer: MEDICARE

## 2024-06-24 DIAGNOSIS — G89.29 CHRONIC MIDLINE LOW BACK PAIN WITHOUT SCIATICA: Primary | ICD-10-CM

## 2024-06-24 DIAGNOSIS — M54.50 CHRONIC MIDLINE LOW BACK PAIN WITHOUT SCIATICA: Primary | ICD-10-CM

## 2024-06-24 DIAGNOSIS — R29.898 DECREASED STRENGTH OF TRUNK AND BACK: ICD-10-CM

## 2024-06-24 PROCEDURE — 97112 NEUROMUSCULAR REEDUCATION: CPT | Mod: CQ

## 2024-06-24 PROCEDURE — 97110 THERAPEUTIC EXERCISES: CPT | Mod: CQ

## 2024-06-24 NOTE — PROGRESS NOTES
"[facility]  Physical Therapy Treatment Note     Name: Rico Park Jr.  Clinic Number: [id number]    Therapy Diagnosis:   Encounter Diagnoses   Name Primary?    Chronic midline low back pain without sciatica Yes    Decreased strength of trunk and back          Physician: Osmar Stokes MD    Visit Date: 6/24/2024    Medical Diagnosis from Referral: M54.50 (ICD-10-CM) - Recurrent low back pain  Evaluation Date: 6/10/2024  Authorization Period Expiration: 5/20/2024  Plan of Care Expiration: 8/15/2024  Reassessment Due: 7/6/2024  Visit # / Visits authorized: 4/20 ([employer])  MedX testing visit 2    PTA Visit #: 1/5     Time In: 9:00 am   Time Out: 10:00 am  Total Billable Time: 50 minutes (1:1)30 mins  INSURANCE and OUTCOMES: Fee for Service with FOTO Outcomes 1/3    Precautions: standard    Pattern of pain determined: 1 PEP  Subjective   Rico Park Jr. No pain upon arrival, has been feeling good after sessions.    Patient reports tolerating previous visit well  Patient reports their pain to be  0 /10 on a 0-10 scale with 0 being no pain and 10 being the worst pain imaginable.  Pain Location: Midline LB     Occupation:   Leisure: Run 1 mile/day, read     Pt goals:  "To get rid of the pain when I run, and get tools to correct my posture".   Objective      Lumbar  Isometric Testing on Med X equipment: Testing administered by PT    Test Initial Baseline Midpoint Final   Date 6/12/2024     ROM 3-39 deg     Max Peak Torque 95      Min Peak Torque 33      Flex/Ext Ratio 2.9:1     % variance  normative data 64% below     % change from initial test N/A visit 1         Outcomes:  Intake Score: 89%  Visit 6 Score:   Visit 10 Score:   Discharge Score:  Goal Score: 94%   Treatment     Rico received the treatments listed below:        Rico participated in neuromuscular re-education activities to improve balance, coordination, proprioception, motor control " "and/or posture for 15 minutes. The following activities were included:     Medical MedX Treatment as follows:  MedX testing performed day 2: Patient  received neuromuscular education to engage spinal musculature correctly for motor control and engagement of musculature for 10 minutes including the MedX exercise component and practice and standard testing. MedX dynamic exercise and baseline isometric test performed with instructions to guide the patient safely through the testing procedure. Patient instructed to perform isometric test correctly and safely while building to an optimal force with a pain-free effort. Patient also instructed that they should feel support/pressure from MedX restraints but no pain/discomfort, and encouraged to report any pain to therapist. Patient demonstrated appropriate understanding of information and tolerance of test.  Education regarding purpose of test, safety during test given, and reviewed possible more soreness and strategies.           6/24/2024     9:09 AM   [facility]   Visit Number 4   VAS Pain Rating 0   Lumbar Weight 54 lbs   Repetitions 18   Rating of Perceived Exertion 3            Rico participated in therapeutic exercises to develop strength, endurance, ROM, flexibility, posture, and core stabilization for 40 minutes including:    LTRs 10x5"  EIL 2x10  PPT 15 x 5"  Supine 90-90 marching x 10  Bridges 15x3"  +Pallof press red sports cord x10    Peripheral muscle strengthening which included one set of 15-20 repetitions at a slow and controlled 10-13 second per rep pace focused on strengthening supporting musculature in order to improve body mechanics and functional mobility. Patient and therapist focused on proper form during treatment to ensure optimal strengthening of each targeted muscle group.  Machines utilized included:Torso rotation, Leg Ext, Leg Curl, Chest Press, and Rowing  Triceps, Biceps, Hip Abd, Hip Add, and Leg Press      Rico " participated in dynamic functional therapeutic activities to improve functional performance and simulate household and community activities for -  minutes. The following activities were included:        Rico received manual therapy techniques for -  minutes. The following activities were included:        Pt given cold pack for 5 minutes to low back in z-lie.    Patient Education and Home Exercises   Home exercises include:  EIL, PPT, 90/90 march, bridges  Cardio program (V5): -  Lifting education (V11): -  Posture/Lumbar roll: acquired  Fridge Magnet Discharge handout (date given): -  Equipment at home/gym membership: Yes, [employer] [employer] [place] [employer]     Education provided:   - PT role and POC  - HEP    Written Home Exercises Provided: Patient instructed to cont prior HEP.  Exercises were reviewed and Rico was able to demonstrate them prior to the end of the session.  Rico demonstrated good  understanding of the education provided.     See EMR under Patient Instructions for exercises provided prior visit.    Assessment   Rico required minimal cueing for proper form and motor control with lumbopelvic stabilization exercises but able to correct and with instruction. Encouraged patient to perform HEP more consistently to obtain optimal therapeutic gains from PT.  Patient able to perform 18 reps on l/s medx machine with an RPE of 3/10. Cues for pacing/ motor control required for smooth, slow and controlled movement.     Continue program with progression added as tolerated.     Patient is making good progress towards established goals.  Pt will continue to benefit from skilled outpatient physical therapy to address the deficits stated in the impairment chart, provide pt/family education and to maximize pt's level of independence in the home and community environment.     Anticipated Barriers for therapy: none  Pt's spiritual, cultural and educational needs considered and pt agreeable to plan of  care and goals as stated below:     GOALS: Pt is in agreement with the following goals.     Short term goals:  6 weeks or 10 visits   - Pt will demonstrate increased lumbar MedX ROM by at least 8 degrees from the initial ROM value with improvements noted in functional ROM and ability to perform ADLs. Appropriate and Ongoing  - Pt will demonstrate increased MedX average isometric strength value by 10% from initial test resulting in improved ability to perform bending, lifting, and carrying activities safely, confidently. Appropriate and Ongoing  - Pt will report a reduction in worst pain score by 1-2 points for improved tolerance for recreational running. Appropriate and Ongoing  - Pt able to perform HEP correctly with minimal cueing or supervision from therapist to encourage independent management of symptoms. Appropriate and Ongoing     Long term goals: 10 weeks or 20 visits   - Pt will demonstrate increased lumbar MedX ROM by at least 12 degrees from initial ROM value, resulting in improved ability to perform functional forward bending while standing and sitting. Appropriate and Ongoing  - Pt will demonstrate increased MedX average isometric strength value by 20% from initial test resulting in improved ability to perform bending, lifting, and carrying activities safely and confidently. Appropriate and Ongoing  - Pt to demonstrate ability to independently control and reduce their pain through posture positioning and mechanical movements throughout a typical day. Appropriate and Ongoing  - Pt will demonstrate reduced pain and improved functional outcomes as reported on the FOTO by reaching an intake score of >/= 96% functional ability in order to demonstrate subjective improvement in patient's condition. . Appropriate and Ongoing  - Pt will demonstrate independence with the HEP at discharge. Appropriate and Ongoing  - Pt will achieve core strength that is within functional limits in order to return to running 1  mile/day with no greater than 1/10 low back provocation. Appropriate and Ongoing    Plan     Continue with established Plan of Care towards established PT goals.     Therapist: Ana Maria Hedrick, PTA  6/24/2024

## 2024-06-25 ENCOUNTER — OFFICE VISIT (OUTPATIENT)
Dept: INTERNAL MEDICINE | Facility: CLINIC | Age: 79
End: 2024-06-25
Payer: MEDICARE

## 2024-06-25 VITALS
SYSTOLIC BLOOD PRESSURE: 112 MMHG | BODY MASS INDEX: 21.51 KG/M2 | DIASTOLIC BLOOD PRESSURE: 60 MMHG | OXYGEN SATURATION: 97 % | HEART RATE: 64 BPM | WEIGHT: 133.81 LBS | HEIGHT: 66 IN

## 2024-06-25 DIAGNOSIS — H25.11 NUCLEAR SENILE CATARACT OF RIGHT EYE: ICD-10-CM

## 2024-06-25 DIAGNOSIS — Z00.00 ENCOUNTER FOR PREVENTIVE HEALTH EXAMINATION: Primary | ICD-10-CM

## 2024-06-25 DIAGNOSIS — G89.29 CHRONIC MIDLINE LOW BACK PAIN WITHOUT SCIATICA: ICD-10-CM

## 2024-06-25 DIAGNOSIS — Z00.00 ENCOUNTER FOR MEDICARE ANNUAL WELLNESS EXAM: ICD-10-CM

## 2024-06-25 DIAGNOSIS — R29.898 DECREASED STRENGTH OF TRUNK AND BACK: ICD-10-CM

## 2024-06-25 DIAGNOSIS — H25.13 NUCLEAR SCLEROSIS OF BOTH EYES: ICD-10-CM

## 2024-06-25 DIAGNOSIS — N40.0 BENIGN PROSTATIC HYPERPLASIA WITHOUT LOWER URINARY TRACT SYMPTOMS: ICD-10-CM

## 2024-06-25 DIAGNOSIS — M54.50 CHRONIC MIDLINE LOW BACK PAIN WITHOUT SCIATICA: ICD-10-CM

## 2024-06-25 DIAGNOSIS — N52.9 ED (ERECTILE DYSFUNCTION) OF ORGANIC ORIGIN: ICD-10-CM

## 2024-06-25 DIAGNOSIS — M79.2 NEURALGIA OF RIGHT THIGH: ICD-10-CM

## 2024-06-25 DIAGNOSIS — D53.9 MACROCYTIC ANEMIA: ICD-10-CM

## 2024-06-25 PROCEDURE — 1159F MED LIST DOCD IN RCRD: CPT | Mod: CPTII,S$GLB,, | Performed by: NURSE PRACTITIONER

## 2024-06-25 PROCEDURE — 1158F ADVNC CARE PLAN TLK DOCD: CPT | Mod: CPTII,S$GLB,, | Performed by: NURSE PRACTITIONER

## 2024-06-25 PROCEDURE — 99999 PR PBB SHADOW E&M-EST. PATIENT-LVL IV: CPT | Mod: PBBFAC,,, | Performed by: NURSE PRACTITIONER

## 2024-06-25 PROCEDURE — G0439 PPPS, SUBSEQ VISIT: HCPCS | Mod: S$GLB,,, | Performed by: NURSE PRACTITIONER

## 2024-06-25 PROCEDURE — 1126F AMNT PAIN NOTED NONE PRSNT: CPT | Mod: CPTII,S$GLB,, | Performed by: NURSE PRACTITIONER

## 2024-06-25 PROCEDURE — 1170F FXNL STATUS ASSESSED: CPT | Mod: CPTII,S$GLB,, | Performed by: NURSE PRACTITIONER

## 2024-06-25 PROCEDURE — 3078F DIAST BP <80 MM HG: CPT | Mod: CPTII,S$GLB,, | Performed by: NURSE PRACTITIONER

## 2024-06-25 PROCEDURE — 1160F RVW MEDS BY RX/DR IN RCRD: CPT | Mod: CPTII,S$GLB,, | Performed by: NURSE PRACTITIONER

## 2024-06-25 PROCEDURE — 3074F SYST BP LT 130 MM HG: CPT | Mod: CPTII,S$GLB,, | Performed by: NURSE PRACTITIONER

## 2024-06-25 PROCEDURE — 3288F FALL RISK ASSESSMENT DOCD: CPT | Mod: CPTII,S$GLB,, | Performed by: NURSE PRACTITIONER

## 2024-06-25 PROCEDURE — 1101F PT FALLS ASSESS-DOCD LE1/YR: CPT | Mod: CPTII,S$GLB,, | Performed by: NURSE PRACTITIONER

## 2024-06-25 NOTE — PROGRESS NOTES
"  Rico Park presented for an initial Medicare AWV today. The following components were reviewed and updated:    Medical history  Family History  Social history  Allergies and Current Medications  Health Risk Assessment  Health Maintenance  Care Team    **See Completed Assessments for Annual Wellness visit with in the encounter summary    The following assessments were completed:  Depression Screening  Cognitive function Screening  Timed Get Up Test  Whisper Test      Opioid documentation:      Patient does not have a current opioid prescription.          Vitals:    06/25/24 1421   BP: 112/60   BP Location: Right arm   Patient Position: Sitting   BP Method: Small (Manual)   Pulse: 64   SpO2: 97%   Weight: 60.7 kg (133 lb 13.1 oz)   Height: 5' 6" (1.676 m)     Body mass index is 21.6 kg/m².       Physical Exam  Constitutional:       Appearance: Normal appearance.   HENT:      Head: Normocephalic and atraumatic.      Nose: Nose normal.   Pulmonary:      Effort: Pulmonary effort is normal.      Breath sounds: Normal breath sounds.   Musculoskeletal:         General: Normal range of motion.   Neurological:      Mental Status: He is alert.           Diagnoses and health risks identified today and associated recommendations/orders:  1. Encounter for preventive health examination  Annual Health Risk Assessment (HRA) visit today.  Counseling and referral of health maintenance and preventative health measures performed.  Patient given annual wellness paperwork to take home.  Encouraged to return in 1 year for subsequent HRA visit.     2. Macrocytic anemia  Chronic. Stable. Continue current treatment plan as previously prescribed by PCP.    3. ED (erectile dysfunction) of organic origin  Chronic. Stable. Continue current treatment plan as previously prescribed by PCP.    4. Benign prostatic hyperplasia without lower urinary tract symptoms  Chronic. Stable. Continue current treatment plan as previously prescribed by " PCP.    5. Nuclear sclerosis of both eyes  Chronic. Stable. Continue current treatment plan as previously prescribed by PCP.    6. Encounter for Medicare annual wellness exam  Chronic. Stable. Continue current treatment plan as previously prescribed by PCP.  - Ambulatory Referral/Consult to Enhanced Annual Wellness Visit (eAWV)    7. Nuclear senile cataract of right eye  Chronic. Stable. Continue current treatment plan as previously prescribed by PCP.    8. Neuralgia of right thigh  Chronic. Stable. Continue current treatment plan as previously prescribed by PCP.    9. Chronic midline low back pain without sciatica  Chronic. Stable. Continue current treatment plan as previously prescribed by PCP.    10. Decreased strength of trunk and back  Chronic. Stable. Continue current treatment plan as previously prescribed by PCP.      Provided Rico with a 5-10 year written screening schedule and personal prevention plan. Recommendations were developed using the USPSTF age appropriate recommendations. Education, counseling, and referrals were provided as needed.  After Visit Summary printed and given to patient which includes a list of additional screenings\tests needed.    No follow-ups on file.      Justin Zelaya NP

## 2024-06-25 NOTE — PATIENT INSTRUCTIONS
Counseling and Referral of Other Preventative  (Italic type indicates deductible and co-insurance are waived)    Patient Name: Rico Park  Today's Date: 6/25/2024    Health Maintenance       Date Due Completion Date    RSV Vaccine (Age 60+ and Pregnant patients) (1 - 1-dose 60+ series) Never done ---    COVID-19 Vaccine (7 - 2023-24 season) 02/23/2024 10/23/2023    TETANUS VACCINE 09/05/2028 9/5/2018    Lipid Panel 10/11/2028 10/11/2023        No orders of the defined types were placed in this encounter.      The following information is provided to all patients.  This information is to help you find resources for any of the problems found today that may be affecting your health:                  Living healthy guide: www.[facility].[place].gov      Understanding Diabetes: www.diabetes.org      Eating healthy: www.cdc.gov/healthyweight      [facility] home safety checklist: www.cdc.gov/steadi/patient.html      Agency on Aging: www.goea.[place].[facility]      Alcoholics anonymous (AA): www.aa.org      Physical Activity: www.jimena.nih.gov/xj8hwnw      Tobacco use: www.quitwithusla.org

## 2024-06-26 ENCOUNTER — CLINICAL SUPPORT (OUTPATIENT)
Dept: REHABILITATION | Facility: OTHER | Age: 79
End: 2024-06-26
Payer: MEDICARE

## 2024-06-26 DIAGNOSIS — G89.29 CHRONIC MIDLINE LOW BACK PAIN WITHOUT SCIATICA: Primary | ICD-10-CM

## 2024-06-26 DIAGNOSIS — M54.50 CHRONIC MIDLINE LOW BACK PAIN WITHOUT SCIATICA: Primary | ICD-10-CM

## 2024-06-26 DIAGNOSIS — R29.898 DECREASED STRENGTH OF TRUNK AND BACK: ICD-10-CM

## 2024-06-26 PROCEDURE — 97112 NEUROMUSCULAR REEDUCATION: CPT | Mod: CQ

## 2024-06-26 PROCEDURE — 97110 THERAPEUTIC EXERCISES: CPT | Mod: CQ

## 2024-06-26 NOTE — PROGRESS NOTES
"[facility]  Physical Therapy Treatment Note     Name: Rico Park Jr.  Clinic Number: [id number]    Therapy Diagnosis:   Encounter Diagnoses   Name Primary?    Chronic midline low back pain without sciatica Yes    Decreased strength of trunk and back          Physician: Osmar Stokes MD    Visit Date: 6/26/2024    Medical Diagnosis from Referral: M54.50 (ICD-10-CM) - Recurrent low back pain  Evaluation Date: 6/10/2024  Authorization Period Expiration: 5/20/2024  Plan of Care Expiration: 8/15/2024  Reassessment Due: 7/6/2024  Visit # / Visits authorized: 4/20 ([employer])  MedX testing visit 2    PTA Visit #: 3/5     Time In: 9:30 am   Time Out: 10:30 am  Total Billable Time: 30 minutes (1:1)  INSURANCE and OUTCOMES: Fee for Service with FOTO Outcomes 1/3    Precautions: standard    Pattern of pain determined: 1 PEP  Subjective   Rico Park Jr.  Reports no pain upon entry. He also reports tolerance with jogging as he has not experienced any pain since beginning therapy.     Patient reports tolerating previous visit well  Patient reports their pain to be  0 /10 on a 0-10 scale with 0 being no pain and 10 being the worst pain imaginable.  Pain Location: Midline LB     Occupation:   Leisure: Run 1 mile/day, read     Pt goals:  "To get rid of the pain when I run, and get tools to correct my posture".   Objective      Lumbar  Isometric Testing on Med X equipment: Testing administered by PT    Test Initial Baseline Midpoint Final   Date 6/12/2024     ROM 3-39 deg     Max Peak Torque 95      Min Peak Torque 33      Flex/Ext Ratio 2.9:1     % variance  normative data 64% below     % change from initial test N/A visit 1         Outcomes:  Intake Score: 89%  Visit 6 Score:   Visit 10 Score:   Discharge Score:  Goal Score: 94%   Treatment     Rico received the treatments listed below:        Rico participated in neuromuscular re-education activities to " "improve balance, coordination, proprioception, motor control and/or posture for 15 minutes. The following activities were included:     Medical MedX Treatment as follows:  MedX testing performed day 2: Patient  received neuromuscular education to engage spinal musculature correctly for motor control and engagement of musculature for 10 minutes including the MedX exercise component and practice and standard testing. MedX dynamic exercise and baseline isometric test performed with instructions to guide the patient safely through the testing procedure. Patient instructed to perform isometric test correctly and safely while building to an optimal force with a pain-free effort. Patient also instructed that they should feel support/pressure from MedX restraints but no pain/discomfort, and encouraged to report any pain to therapist. Patient demonstrated appropriate understanding of information and tolerance of test.  Education regarding purpose of test, safety during test given, and reviewed possible more soreness and strategies.               6/26/2024     9:56 AM   [facility]   Visit Number 5   VAS Pain Rating 0   Femur Restraint 5   Lumbar Weight 54 lbs   Repetitions 20   Rating of Perceived Exertion 3             Rico participated in therapeutic exercises to develop strength, endurance, ROM, flexibility, posture, and core stabilization for 40 minutes including:    LTRs 10x5"  EIL 2x10  PPT 15 x 5"  Supine 90-90 marching x 10  Bridges 15x3"  Pallof press red sports cord x15  + Side steps with RTB 2 lapse 28ft      Peripheral muscle strengthening which included one set of 15-20 repetitions at a slow and controlled 10-13 second per rep pace focused on strengthening supporting musculature in order to improve body mechanics and functional mobility. Patient and therapist focused on proper form during treatment to ensure optimal strengthening of each targeted muscle group.  Machines utilized included:Torso " rotation, Leg Ext, Leg Curl, Chest Press, and Rowing  Triceps, Biceps, Hip Abd, Hip Add, and Leg Press      Rico participated in dynamic functional therapeutic activities to improve functional performance and simulate household and community activities for -  minutes. The following activities were included:        Rico received manual therapy techniques for -  minutes. The following activities were included:        Pt given cold pack for 5 minutes to low back in z-lie.    Patient Education and Home Exercises   Home exercises include:  EIL, PPT, 90/90 march, bridges  Cardio program (V5): -  Lifting education (V11): -  Posture/Lumbar roll: acquired  Fridge Magnet Discharge handout (date given): -  Equipment at home/gym membership: Yes, [employer] [place] [employer]     Education provided:   - PT role and POC  - HEP    Written Home Exercises Provided: Patient instructed to cont prior HEP.  Exercises were reviewed and Rico was able to demonstrate them prior to the end of the session.  Rico demonstrated good  understanding of the education provided.     See EMR under Patient Instructions for exercises provided prior visit.    Assessment   Rico presents to therapy without reports of lumbar pain. He tolerated session well with good response to introduced side steps with resistance, requiring cues for proper squat/hip hinge technique. He demonstrates weakness/difficulty achieving PPT that slightly improved with tactile cues. Cardio handout reviewed and issued with good understanding. Lumbar MedX completed with resistance maintained at 54 lbs, progressing to 20 reps at 3/10 RPE with control noted. Peripheral muscle strengthening performed w/o adverse effects. Continue program with progression added as tolerated.     Patient is making good progress towards established goals.  Pt will continue to benefit from skilled outpatient physical therapy to address the deficits stated in the impairment  chart, provide pt/family education and to maximize pt's level of independence in the home and community environment.     Anticipated Barriers for therapy: none  Pt's spiritual, cultural and educational needs considered and pt agreeable to plan of care and goals as stated below:     GOALS: Pt is in agreement with the following goals.     Short term goals:  6 weeks or 10 visits   - Pt will demonstrate increased lumbar MedX ROM by at least 8 degrees from the initial ROM value with improvements noted in functional ROM and ability to perform ADLs. Appropriate and Ongoing  - Pt will demonstrate increased MedX average isometric strength value by 10% from initial test resulting in improved ability to perform bending, lifting, and carrying activities safely, confidently. Appropriate and Ongoing  - Pt will report a reduction in worst pain score by 1-2 points for improved tolerance for recreational running. Appropriate and Ongoing  - Pt able to perform HEP correctly with minimal cueing or supervision from therapist to encourage independent management of symptoms. Appropriate and Ongoing     Long term goals: 10 weeks or 20 visits   - Pt will demonstrate increased lumbar MedX ROM by at least 12 degrees from initial ROM value, resulting in improved ability to perform functional forward bending while standing and sitting. Appropriate and Ongoing  - Pt will demonstrate increased MedX average isometric strength value by 20% from initial test resulting in improved ability to perform bending, lifting, and carrying activities safely and confidently. Appropriate and Ongoing  - Pt to demonstrate ability to independently control and reduce their pain through posture positioning and mechanical movements throughout a typical day. Appropriate and Ongoing  - Pt will demonstrate reduced pain and improved functional outcomes as reported on the FOTO by reaching an intake score of >/= 96% functional ability in order to demonstrate subjective  improvement in patient's condition. . Appropriate and Ongoing  - Pt will demonstrate independence with the HEP at discharge. Appropriate and Ongoing  - Pt will achieve core strength that is within functional limits in order to return to running 1 mile/day with no greater than 1/10 low back provocation. Appropriate and Ongoing    Plan     Continue with established Plan of Care towards established PT goals.     Therapist: Jessi Kearney, PTA  6/26/2024

## 2024-07-01 ENCOUNTER — CLINICAL SUPPORT (OUTPATIENT)
Dept: REHABILITATION | Facility: OTHER | Age: 79
End: 2024-07-01
Payer: MEDICARE

## 2024-07-01 DIAGNOSIS — M54.50 CHRONIC MIDLINE LOW BACK PAIN WITHOUT SCIATICA: Primary | ICD-10-CM

## 2024-07-01 DIAGNOSIS — G89.29 CHRONIC MIDLINE LOW BACK PAIN WITHOUT SCIATICA: Primary | ICD-10-CM

## 2024-07-01 DIAGNOSIS — R29.898 DECREASED STRENGTH OF TRUNK AND BACK: ICD-10-CM

## 2024-07-01 PROCEDURE — 97110 THERAPEUTIC EXERCISES: CPT | Performed by: PHYSICAL MEDICINE & REHABILITATION

## 2024-07-01 PROCEDURE — 97112 NEUROMUSCULAR REEDUCATION: CPT | Performed by: PHYSICAL MEDICINE & REHABILITATION

## 2024-07-01 NOTE — PATIENT INSTRUCTIONS
Backward Bend (Standing)                  Do this exercise on your hands and knees. Keep your knees under your hips and your hands under your shoulders.      Relax your abdominal and buttocks muscles, lift your head, and let your back sag. Be sure to keep your weight evenly distributed. Dont sit back on your hips.   Hold for 5 seconds.  Return to starting position.  Tuck your head and lift (arch) your back.  Hold for 5 seconds  Return to starting position.  Repeat 5 times.  Date Last Reviewed: 8/16/2015  © 8640-6135 [employer] [address] [place] [id number]. All rights reserved. This information is not intended as a substitute for professional medical care. Always follow your healthcare professional's instructions.

## 2024-07-01 NOTE — PROGRESS NOTES
"[facility]  Physical Therapy Treatment Note     Name: Rico Park Jr.  Clinic Number: [id number]    Therapy Diagnosis:   Encounter Diagnoses   Name Primary?    Chronic midline low back pain without sciatica Yes    Decreased strength of trunk and back            Physician: Osmar Stokes MD    Visit Date: 7/1/2024    Medical Diagnosis from Referral: M54.50 (ICD-10-CM) - Recurrent low back pain  Evaluation Date: 6/10/2024  Authorization Period Expiration: 5/20/2024  Plan of Care Expiration: 8/15/2024  Reassessment Due: 8/1/24  Visit # / Visits authorized: 6/20 ([facility])  MedX testing visit 2    PTA Visit #: 3/5     Time In: 9:00 am   Time Out: 10:00 am  Total Billable Time: 39 minutes (1:1)  INSURANCE and OUTCOMES: Fee for Service with FOTO Outcomes 2/3    Precautions: standard    Pattern of pain determined: 1 PEP  Subjective   Rico Park Jr.  Reports no pain upon entry. He also reports tolerance with jogging as he has not experienced any pain since beginning therapy.   He runs 1 mile slowly 5 days/week.  Cardio discussed and he is compliant.  He is using lumbar roll seated.  Over all making god progress.  He is stretching which is something he didn't do in the past and he is starting to see the benefit    Patient reports tolerating previous visit well  Patient reports their pain to be  0 /10 on a 0-10 scale with 0 being no pain and 10 being the worst pain imaginable.  Pain Location: Midline LB     Occupation:   Leisure: Run 1 mile/day, read     Pt goals:  "To get rid of the pain when I run, and get tools to correct my posture".   Objective      Lumbar  Isometric Testing on Med X equipment: Testing administered by PT    Test Initial Baseline Midpoint Final   Date 6/12/2024     ROM 3-39 deg     Max Peak Torque 95      Min Peak Torque 33      Flex/Ext Ratio 2.9:1     % variance  normative data 64% below     % change from initial test N/A visit 1   "       MOVEMENT LOSS - Lumbar    Norms ROM Loss Initial 7/1/24   Flexion Fingers touch toes, sacral angle >/= 70 deg, uniform spinal curvature, posterior weight shift  major loss Mod loss   Extension ASIS surpasses toes, spine of scapulae surpasses heels, uniform spinal curve major loss Mod loss   Side glide Right   major loss Mod loss   Side glide Left   major loss Mod loss   Rotation Right PT observes contralateral shoulder major loss Mod loss   Rotation Left PT observes contralateral shoulder major loss Mod loss      Outcomes:  Intake Score: 89%  Visit 6 Score: 89%  Visit 10 Score:   Discharge Score:  Goal Score: 94%   Treatment     Rico received the treatments listed below:        Rico participated in neuromuscular re-education activities to improve balance, coordination, proprioception, motor control and/or posture for 15 minutes. The following activities were included:     MedX exercise :  Patient received neuromuscular education for 10 minutes via participation on the [employer] MedX Machine. Therapist assisted patient in isolating and engaging spinal stabilization musculature in order to improve functional ability and postural control. Patient performed exercise with therapist guidance in order to accurately use pacer function, avoid valsalva, and optimally exert effort within a safe and effective range via the Thuy Exertion Rating Scale. Patient instructed to perform at a midrange of exertion and to complete 15-20 repetitions within appropriate split time, with proper technique, and while maintaining safety.                 7/1/2024     9:34 AM   [facility]   Visit Number 6   VAS Pain Rating 0   Treadmill Time (in min.) 5 min   Extension in Lying 10   Extension in Standing 10   Flexion in Lying 10   Lumbar Weight 54 lbs   Repetitions 17   Rating of Perceived Exertion 4   Ice - Z Lie (in min.) 5          Rico participated in therapeutic exercises to develop strength, endurance, ROM,  "flexibility, posture, and core stabilization for 40 minutes including:    Hamstring stretching supine with cord, 3 sets 10 sec  EIS during day (he sits a lot)  LTRs 10x5"  EIL 2x10  PPT 15 x 5"  Supine 90-90 marching x 10  Bridges 15x2" with green theraband  Pallof press red sports cord x15  + Side steps with RTB 2 lapse 28ft      Peripheral muscle strengthening which included one set of 15-20 repetitions at a slow and controlled 10-13 second per rep pace focused on strengthening supporting musculature in order to improve body mechanics and functional mobility. Patient and therapist focused on proper form during treatment to ensure optimal strengthening of each targeted muscle group.  Machines utilized included:Torso rotation, Leg Ext, Leg Curl, Chest Press, and Rowing  Triceps, Biceps, Hip Abd, Hip Add, and Leg Press      Rico participated in dynamic functional therapeutic activities to improve functional performance and simulate household and community activities for -  minutes. The following activities were included:        Rico received manual therapy techniques for -  minutes. The following activities were included:        Pt given cold pack for 5 minutes to low back in z-lie.    Patient Education and Home Exercises   Home exercises include:  EIL, PPT, 90/90 march, bridges, ext in standing, hamstring stretching  Cardio program (V5): -  7/1/24  Lifting education (V11): -  Posture/Lumbar roll: acquired  Frie Magnet Discharge handout (date given): -  Equipment at home/gym membership: Yes, [employer]     Education provided:   - PT role and POC  - HEP-addition of hamstring stretching and ext in standing    Written Home Exercises Provided: Patient instructed to cont prior HEP.  Exercises were reviewed and Rico was able to demonstrate them prior to the end of the session.  Rico demonstrated good  understanding of the education provided.     See EMR under Patient " Instructions for exercises provided 7/1/24    Assessment   Rico presents to therapy without reports of lumbar pain. He tolerated session well with good response to introduced hamstring stretching and ext in standing to off set sitting during the day. He does use lumbar roll.  Cardio discussed and he is doing walk/run 5 days/week. Continued side steps with resistance, requiring cues for proper squat/hip hinge technique. He demonstrates weakness/difficulty achieving PPT that slightly improved with tactile cues. Tried cat/cow which he performed with improved pelvic control and this was added to HEP as well.  Reprinted HEP for him. Cardio handout reviewed and issued with good understanding. Lumbar MedX completed with resistance increased to 58ft lbs , progressing to 17 reps at 4/10 RPE with control noted. He was able to extend past 3 to 1 and 2 degrees a few reps with cuing.  Continue to work to 0 degree range on medx. Peripheral muscle strengthening performed w/o adverse effects. Continue program with progressions added as tolerated.     Patient is making good progress towards established goals.  Pt will continue to benefit from skilled outpatient physical therapy to address the deficits stated in the impairment chart, provide pt/family education and to maximize pt's level of independence in the home and community environment.     Anticipated Barriers for therapy: none  Pt's spiritual, cultural and educational needs considered and pt agreeable to plan of care and goals as stated below:     GOALS: Pt is in agreement with the following goals.     Short term goals:  6 weeks or 10 visits   - Pt will demonstrate increased lumbar MedX ROM by at least 8 degrees from the initial ROM value with improvements noted in functional ROM and ability to perform ADLs. Appropriate and Ongoing  - Pt will demonstrate increased MedX average isometric strength value by 10% from initial test resulting in improved ability to perform  bending, lifting, and carrying activities safely, confidently. Appropriate and Ongoing  - Pt will report a reduction in worst pain score by 1-2 points for improved tolerance for recreational running. Appropriate and Ongoing  - Pt able to perform HEP correctly with minimal cueing or supervision from therapist to encourage independent management of symptoms. Appropriate and Ongoing     Long term goals: 10 weeks or 20 visits   - Pt will demonstrate increased lumbar MedX ROM by at least 12 degrees from initial ROM value, resulting in improved ability to perform functional forward bending while standing and sitting. Appropriate and Ongoing  - Pt will demonstrate increased MedX average isometric strength value by 20% from initial test resulting in improved ability to perform bending, lifting, and carrying activities safely and confidently. Appropriate and Ongoing  - Pt to demonstrate ability to independently control and reduce their pain through posture positioning and mechanical movements throughout a typical day. Appropriate and Ongoing  - Pt will demonstrate reduced pain and improved functional outcomes as reported on the FOTO by reaching an intake score of >/= 96% functional ability in order to demonstrate subjective improvement in patient's condition. . Appropriate and Ongoing  - Pt will demonstrate independence with the HEP at discharge. Appropriate and Ongoing  - Pt will achieve core strength that is within functional limits in order to return to running 1 mile/day with no greater than 1/10 low back provocation. Appropriate and Ongoing    Plan     Continue with established Plan of Care towards established PT goals.     Therapist: Diana Velazquez, PT  7/1/2024

## 2024-07-03 ENCOUNTER — CLINICAL SUPPORT (OUTPATIENT)
Dept: REHABILITATION | Facility: OTHER | Age: 79
End: 2024-07-03
Payer: MEDICARE

## 2024-07-03 DIAGNOSIS — G89.29 CHRONIC MIDLINE LOW BACK PAIN WITHOUT SCIATICA: Primary | ICD-10-CM

## 2024-07-03 DIAGNOSIS — R29.898 DECREASED STRENGTH OF TRUNK AND BACK: ICD-10-CM

## 2024-07-03 DIAGNOSIS — M54.50 CHRONIC MIDLINE LOW BACK PAIN WITHOUT SCIATICA: Primary | ICD-10-CM

## 2024-07-03 PROCEDURE — 97110 THERAPEUTIC EXERCISES: CPT

## 2024-07-03 PROCEDURE — 97112 NEUROMUSCULAR REEDUCATION: CPT

## 2024-07-03 NOTE — PROGRESS NOTES
"[facility]  Physical Therapy Treatment Note     Name: Rico Park Jr.  Clinic Number: [id number]    Therapy Diagnosis:   Encounter Diagnoses   Name Primary?    Chronic midline low back pain without sciatica Yes    Decreased strength of trunk and back        Physician: Osmar Stokes MD    Visit Date: 7/3/2024    Medical Diagnosis from Referral: M54.50 (ICD-10-CM) - Recurrent low back pain  Evaluation Date: 6/10/2024  Authorization Period Expiration: 5/20/2024  Plan of Care Expiration: 8/15/2024  Reassessment Due: 8/1/24  Visit # / Visits authorized: 7/20 ([employer])  MedX testing visit 2    PTA Visit #: 0/5     Time In: 9:00 am   Time Out: 10:00 am  Total Billable Time: 55 minutes (1:1)  INSURANCE and OUTCOMES: Fee for Service with FOTO Outcomes 2/3    Precautions: standard    Pattern of pain determined: 1 PEP  Subjective   Rico Park Jr.  Reports he continues to feel improvements since starting the program. No pain and has been    Patient reports tolerating previous visit well  Patient reports their pain to be  0 /10 on a 0-10 scale with 0 being no pain and 10 being the worst pain imaginable.  Pain Location: Midline LB     Occupation:   Leisure: Run 1 mile/day, read     Pt goals:  "To get rid of the pain when I run, and get tools to correct my posture".   Objective      Lumbar  Isometric Testing on Med X equipment: Testing administered by PT    Test Initial Baseline Midpoint Final   Date 6/12/2024     ROM 3-39 deg     Max Peak Torque 95      Min Peak Torque 33      Flex/Ext Ratio 2.9:1     % variance  normative data 64% below     % change from initial test N/A visit 1         MOVEMENT LOSS - Lumbar    Norms ROM Loss Initial 7/1/24   Flexion Fingers touch toes, sacral angle >/= 70 deg, uniform spinal curvature, posterior weight shift  major loss Mod loss   Extension ASIS surpasses toes, spine of scapulae surpasses heels, uniform spinal curve major " "loss Mod loss   Side glide Right   major loss Mod loss   Side glide Left   major loss Mod loss   Rotation Right PT observes contralateral shoulder major loss Mod loss   Rotation Left PT observes contralateral shoulder major loss Mod loss      Outcomes:  Intake Score: 89%  Visit 6 Score: 89%  Visit 10 Score:   Discharge Score:  Goal Score: 94%   Treatment     Rico received the treatments listed below:        Rico participated in neuromuscular re-education activities to improve balance, coordination, proprioception, motor control and/or posture for 10 minutes. The following activities were included:     MedX exercise :  Patient received neuromuscular education for 10 minutes via participation on the Medical MedX Machine. Therapist assisted patient in isolating and engaging spinal stabilization musculature in order to improve functional ability and postural control. Patient performed exercise with therapist guidance in order to accurately use pacer function, avoid valsalva, and optimally exert effort within a safe and effective range via the Thuy Exertion Rating Scale. Patient instructed to perform at a midrange of exertion and to complete 15-20 repetitions within appropriate split time, with proper technique, and while maintaining safety.                 7/3/2024     9:19 AM   [facility] - [facility]   Visit Number 7   VAS Pain Rating 0   Treadmill Time (in min.) 5 min   Extension in Lying 10   Flexion in Lying 10   Lumbar Flexion 42   Lumbar Extension 0   Lumbar Weight 58 lbs   Repetitions 20   Rating of Perceived Exertion 4           Rico participated in therapeutic exercises to develop strength, endurance, ROM, flexibility, posture, and core stabilization for 45 minutes including:    Hamstring stretching supine with cord, 3 sets 10 sec  LTRs 10x5"  EIL x10  PPT 15 x 5"  Supine 90-90 marching x 10  Bridges 15x3" with green theraband  Pallof press red sports cord x15  Side steps with RTB 2 laps 30 " ft      Peripheral muscle strengthening which included one set of 15-20 repetitions at a slow and controlled 10-13 second per rep pace focused on strengthening supporting musculature in order to improve body mechanics and functional mobility. Patient and therapist focused on proper form during treatment to ensure optimal strengthening of each targeted muscle group.  Machines utilized included:Torso rotation, Leg Ext, Leg Curl, Chest Press, and Rowing  Triceps, Biceps, Hip Abd, Hip Add, and Leg Press      Rico participated in dynamic functional therapeutic activities to improve functional performance and simulate household and community activities for -  minutes. The following activities were included:        Rico received manual therapy techniques for -  minutes. The following activities were included:        Pt given cold pack for 5 minutes to low back in z-lie.    Patient Education and Home Exercises   Home exercises include:  EIL, PPT, 90/90 march, bridges, ext in standing, hamstring stretching  Cardio program (V5): -  7/1/24  Lifting education (V11): -  Posture/Lumbar roll: acquired  [facility]  Equipment at home/gym membership: [facility], [employer]     Education provided:   - PT role and POC  - HEP-addition of hamstring stretching and ext in standing    Written Home Exercises Provided: Patient instructed to cont prior HEP.  Exercises were reviewed and Rico was able to demonstrate them prior to the end of the session.  Rico demonstrated good  understanding of the education provided.     See EMR under Patient Instructions for exercises provided 7/1/24    Assessment   Rico presents today without complaints of pain. Progressed LE and lumbar strengthening and flexibility exercises with good tolerance and no adverse effects. Medx ROM increased to 0-42 deg and resistance was maintained at 58 ft/lbs and pt was able to complete 20 reps with  4/10 RPE. Progress resistance 10% next visit.     Patient is making good progress towards established goals.  Pt will continue to benefit from skilled outpatient physical therapy to address the deficits stated in the impairment chart, provide pt/family education and to maximize pt's level of independence in the home and community environment.     Anticipated Barriers for therapy: none  Pt's spiritual, cultural and educational needs considered and pt agreeable to plan of care and goals as stated below:     GOALS: Pt is in agreement with the following goals.     Short term goals:  6 weeks or 10 visits   - Pt will demonstrate increased lumbar MedX ROM by at least 8 degrees from the initial ROM value with improvements noted in functional ROM and ability to perform ADLs. Appropriate and Ongoing  - Pt will demonstrate increased MedX average isometric strength value by 10% from initial test resulting in improved ability to perform bending, lifting, and carrying activities safely, confidently. Appropriate and Ongoing  - Pt will report a reduction in worst pain score by 1-2 points for improved tolerance for recreational running. Appropriate and Ongoing  - Pt able to perform HEP correctly with minimal cueing or supervision from therapist to encourage independent management of symptoms. Appropriate and Ongoing     Long term goals: 10 weeks or 20 visits   - Pt will demonstrate increased lumbar MedX ROM by at least 12 degrees from initial ROM value, resulting in improved ability to perform functional forward bending while standing and sitting. Appropriate and Ongoing  - Pt will demonstrate increased MedX average isometric strength value by 20% from initial test resulting in improved ability to perform bending, lifting, and carrying activities safely and confidently. Appropriate and Ongoing  - Pt to demonstrate ability to independently control and reduce their pain through posture positioning and mechanical movements throughout a  typical day. Appropriate and Ongoing  - Pt will demonstrate reduced pain and improved functional outcomes as reported on the FOTO by reaching an intake score of >/= 96% functional ability in order to demonstrate subjective improvement in patient's condition. . Appropriate and Ongoing  - Pt will demonstrate independence with the HEP at discharge. Appropriate and Ongoing  - Pt will achieve core strength that is within functional limits in order to return to running 1 mile/day with no greater than 1/10 low back provocation. Appropriate and Ongoing    Plan     Continue with established Plan of Care towards established PT goals.     Therapist: Lukas Gaytan, PT  7/3/2024

## 2024-07-08 ENCOUNTER — CLINICAL SUPPORT (OUTPATIENT)
Dept: REHABILITATION | Facility: OTHER | Age: 79
End: 2024-07-08
Payer: MEDICARE

## 2024-07-08 DIAGNOSIS — R29.898 DECREASED STRENGTH OF TRUNK AND BACK: ICD-10-CM

## 2024-07-08 DIAGNOSIS — M54.50 CHRONIC MIDLINE LOW BACK PAIN WITHOUT SCIATICA: Primary | ICD-10-CM

## 2024-07-08 DIAGNOSIS — G89.29 CHRONIC MIDLINE LOW BACK PAIN WITHOUT SCIATICA: Primary | ICD-10-CM

## 2024-07-08 PROCEDURE — 97112 NEUROMUSCULAR REEDUCATION: CPT | Mod: CQ

## 2024-07-08 PROCEDURE — 97530 THERAPEUTIC ACTIVITIES: CPT | Mod: CQ

## 2024-07-08 NOTE — PROGRESS NOTES
"[facility]  Physical Therapy Treatment Note     Name: Rico Park Jr.  Clinic Number: [id number]    Therapy Diagnosis:   Encounter Diagnoses   Name Primary?    Chronic midline low back pain without sciatica Yes    Decreased strength of trunk and back          Physician: Osmar Stokes MD    Visit Date: 7/8/2024    Medical Diagnosis from Referral: M54.50 (ICD-10-CM) - Recurrent low back pain  Evaluation Date: 6/10/2024  Authorization Period Expiration: 5/20/2024  Plan of Care Expiration: 8/15/2024  Reassessment Due: 8/1/24  Visit # / Visits authorized: 8/20 ([employer])  MedX testing visit 2    PTA Visit #: 1/5     Time In: 900 AM   Time Out: 945 AM   Total Billable Time: 30  minutes (1:1)  INSURANCE and OUTCOMES: Fee for Service with FOTO Outcomes 2/3    Precautions: standard    Pattern of pain determined: 1 PEP  Subjective   Rico Park Jr.  Reports he has no pain today.     Patient reports tolerating previous visit well  Patient reports their pain to be  0 /10 on a 0-10 scale with 0 being no pain and 10 being the worst pain imaginable.  Pain Location: Midline LB     Occupation:   Leisure: Run 1 mile/day, read     Pt goals:  "To get rid of the pain when I run, and get tools to correct my posture".   Objective      Lumbar  Isometric Testing on Med X equipment: Testing administered by PT    Test Initial Baseline Midpoint Final   Date 6/12/2024     ROM 3-39 deg     Max Peak Torque 95      Min Peak Torque 33      Flex/Ext Ratio 2.9:1     % variance  normative data 64% below     % change from initial test N/A visit 1         MOVEMENT LOSS - Lumbar    Norms ROM Loss Initial 7/1/24   Flexion Fingers touch toes, sacral angle >/= 70 deg, uniform spinal curvature, posterior weight shift  major loss Mod loss   Extension ASIS surpasses toes, spine of scapulae surpasses heels, uniform spinal curve major loss Mod loss   Side glide Right   major loss Mod loss   Side " "glide Left   major loss Mod loss   Rotation Right PT observes contralateral shoulder major loss Mod loss   Rotation Left PT observes contralateral shoulder major loss Mod loss      Outcomes:  Intake Score: 89%  Visit 6 Score: 89%  Visit 10 Score:   Discharge Score:  Goal Score: 94%   Treatment     Rico received the treatments listed below:        Rico participated in neuromuscular re-education activities to improve balance, coordination, proprioception, motor control and/or posture for 10 minutes. The following activities were included:     MedX exercise :  Patient received neuromuscular education for 10 minutes via participation on the Medical MedX Machine. Therapist assisted patient in isolating and engaging spinal stabilization musculature in order to improve functional ability and postural control. Patient performed exercise with therapist guidance in order to accurately use pacer function, avoid valsalva, and optimally exert effort within a safe and effective range via the Thuy Exertion Rating Scale. Patient instructed to perform at a midrange of exertion and to complete 15-20 repetitions within appropriate split time, with proper technique, and while maintaining safety.             7/8/2024     9:03 AM   [facility]   Visit Number 8   VAS Pain Rating 0   Treadmill Time (in min.) 5 min   Speed 3.1 mph   Extension in Lying 10   Lumbar Weight 63 lbs   Repetitions 18   Rating of Perceived Exertion 3   Ice - Z Lie (in min.) 5               Rico participated in therapeutic exercises to develop strength, endurance, ROM, flexibility, posture, and core stabilization for 25 minutes including:    Hamstring stretching supine with cord, 3 sets 10 sec  LTRs 10x5"  EIL x10  PPT 15 x 5"  Supine 90-90 marching x 10  Bridges 15x3" with green theraband  Pallof press red sports cord x15  Side steps with RTB 2 laps 30 ft      Peripheral muscle strengthening which included one set of 15-20 repetitions at a slow " and controlled 10-13 second per rep pace focused on strengthening supporting musculature in order to improve body mechanics and functional mobility. Patient and therapist focused on proper form during treatment to ensure optimal strengthening of each targeted muscle group.  Machines utilized included:Torso rotation, Leg Ext, Leg Curl, Chest Press, and Rowing  Triceps, Biceps, Hip Abd, Hip Add, and Leg Press      Rioc participated in dynamic functional therapeutic activities to improve functional performance and simulate household and community activities for 10 minutes. The following activities were included:    STS 7.5KB 2 x 10   Step up with contralateral hip flexion x 10 ea     Rico received manual therapy techniques for -  minutes. The following activities were included:        Pt given cold pack for 5 minutes to low back in z-lie.    Patient Education and Home Exercises   Home exercises include:  EIL, PPT, 90/90 march, bridges, ext in standing, hamstring stretching  Cardio program (V5): -  7/1/24  Lifting education (V11): -  Posture/Lumbar roll: acquired  [facility]  Equipment at home/gym membership: [facility], [employer]     Education provided:   - PT role and POC  - HEP-addition of hamstring stretching and ext in standing    Written Home Exercises Provided: Patient instructed to cont prior HEP.  Exercises were reviewed and Rico was able to demonstrate them prior to the end of the session.  Rico demonstrated good  understanding of the education provided.     See EMR under Patient Instructions for exercises provided 7/1/24    Assessment   Rico presents to session without c/o pain. Progressed LE functional mobility and strengthening to address pts goals of decreased pain when running. Required HHA with step ups with hip flexion due to decreased stability. MedX was performed at 63ft lbs with 18 reps performed at an exertion rating of  3/10. MedX does not represent actual reps performed due to error. No issues with peripherals.     Patient is making good progress towards established goals.  Pt will continue to benefit from skilled outpatient physical therapy to address the deficits stated in the impairment chart, provide pt/family education and to maximize pt's level of independence in the home and community environment.     Anticipated Barriers for therapy: none  Pt's spiritual, cultural and educational needs considered and pt agreeable to plan of care and goals as stated below:     GOALS: Pt is in agreement with the following goals.     Short term goals:  6 weeks or 10 visits   - Pt will demonstrate increased lumbar MedX ROM by at least 8 degrees from the initial ROM value with improvements noted in functional ROM and ability to perform ADLs. Appropriate and Ongoing  - Pt will demonstrate increased MedX average isometric strength value by 10% from initial test resulting in improved ability to perform bending, lifting, and carrying activities safely, confidently. Appropriate and Ongoing  - Pt will report a reduction in worst pain score by 1-2 points for improved tolerance for recreational running. Appropriate and Ongoing  - Pt able to perform HEP correctly with minimal cueing or supervision from therapist to encourage independent management of symptoms. Appropriate and Ongoing     Long term goals: 10 weeks or 20 visits   - Pt will demonstrate increased lumbar MedX ROM by at least 12 degrees from initial ROM value, resulting in improved ability to perform functional forward bending while standing and sitting. Appropriate and Ongoing  - Pt will demonstrate increased MedX average isometric strength value by 20% from initial test resulting in improved ability to perform bending, lifting, and carrying activities safely and confidently. Appropriate and Ongoing  - Pt to demonstrate ability to independently control and reduce their pain through posture  positioning and mechanical movements throughout a typical day. Appropriate and Ongoing  - Pt will demonstrate reduced pain and improved functional outcomes as reported on the FOTO by reaching an intake score of >/= 96% functional ability in order to demonstrate subjective improvement in patient's condition. . Appropriate and Ongoing  - Pt will demonstrate independence with the HEP at discharge. Appropriate and Ongoing  - Pt will achieve core strength that is within functional limits in order to return to running 1 mile/day with no greater than 1/10 low back provocation. Appropriate and Ongoing    Plan     Continue with established Plan of Care towards established PT goals.     Therapist: Shon Gurrola, NICKIE  7/8/2024

## 2024-07-10 ENCOUNTER — CLINICAL SUPPORT (OUTPATIENT)
Dept: REHABILITATION | Facility: OTHER | Age: 79
End: 2024-07-10
Payer: MEDICARE

## 2024-07-10 DIAGNOSIS — R29.898 DECREASED STRENGTH OF TRUNK AND BACK: ICD-10-CM

## 2024-07-10 DIAGNOSIS — M54.50 CHRONIC MIDLINE LOW BACK PAIN WITHOUT SCIATICA: Primary | ICD-10-CM

## 2024-07-10 DIAGNOSIS — G89.29 CHRONIC MIDLINE LOW BACK PAIN WITHOUT SCIATICA: Primary | ICD-10-CM

## 2024-07-10 PROCEDURE — 97110 THERAPEUTIC EXERCISES: CPT | Mod: CQ

## 2024-07-10 PROCEDURE — 97530 THERAPEUTIC ACTIVITIES: CPT | Mod: CQ

## 2024-07-10 NOTE — PROGRESS NOTES
"[facility]  Physical Therapy Treatment Note     Name: Rico Park Jr.  Clinic Number: [id number]    Therapy Diagnosis:   Encounter Diagnoses   Name Primary?    Chronic midline low back pain without sciatica Yes    Decreased strength of trunk and back            Physician: Osmar Stokes MD    Visit Date: 7/10/2024    Medical Diagnosis from Referral: M54.50 (ICD-10-CM) - Recurrent low back pain  Evaluation Date: 6/10/2024  Authorization Period Expiration: 5/20/2024  Plan of Care Expiration: 8/15/2024  Reassessment Due: 8/1/24  Visit # / Visits authorized: 8/20 ([employer])  MedX testing visit 2    PTA Visit #: 1/5     Time In: 900 AM   Time Out: 9:48 AM   Total Billable Time: 45 minutes (1:1)  INSURANCE and OUTCOMES: Fee for Service with FOTO Outcomes 2/3    Precautions: standard    Pattern of pain determined: 1 PEP  Subjective   Rico Park Jr.  Denies having any pain this morning.      Patient reports tolerating previous visit well  Patient reports their pain to be  0 /10 on a 0-10 scale with 0 being no pain and 10 being the worst pain imaginable.  Pain Location: Midline LB     Occupation:   Leisure: Run 1 mile/day, read     Pt goals:  "To get rid of the pain when I run, and get tools to correct my posture".   Objective      Lumbar  Isometric Testing on Med X equipment: Testing administered by PT    Test Initial Baseline Midpoint Final   Date 6/12/2024     ROM 3-39 deg     Max Peak Torque 95      Min Peak Torque 33      Flex/Ext Ratio 2.9:1     % variance  normative data 64% below     % change from initial test N/A visit 1         MOVEMENT LOSS - Lumbar    Norms ROM Loss Initial 7/1/24   Flexion Fingers touch toes, sacral angle >/= 70 deg, uniform spinal curvature, posterior weight shift  major loss Mod loss   Extension ASIS surpasses toes, spine of scapulae surpasses heels, uniform spinal curve major loss Mod loss   Side glide Right   major loss Mod " "loss   Side glide Left   major loss Mod loss   Rotation Right PT observes contralateral shoulder major loss Mod loss   Rotation Left PT observes contralateral shoulder major loss Mod loss      Outcomes:  Intake Score: 89%  Visit 6 Score: 89%  Visit 10 Score:   Discharge Score:  Goal Score: 94%   Treatment     Rico received the treatments listed below:        Rico participated in neuromuscular re-education activities to improve balance, coordination, proprioception, motor control and/or posture for 10 minutes. The following activities were included:               Rico participated in therapeutic exercises to develop strength, endurance, ROM, flexibility, posture, and core stabilization for 25 minutes including:    MedX exercise :  Patient received neuromuscular education for 10 minutes via participation on the Medical MedX Machine. Therapist assisted patient in isolating and engaging spinal stabilization musculature in order to improve functional ability and postural control. Patient performed exercise with therapist guidance in order to accurately use pacer function, avoid valsalva, and optimally exert effort within a safe and effective range via the Thuy Exertion Rating Scale. Patient instructed to perform at a midrange of exertion and to complete 15-20 repetitions within appropriate split time, with proper technique, and while maintaining safety.             7/10/2024     9:25 AM   [facility]   Visit Number 9   VAS Pain Rating 0   Treadmill Time (in min.) 5 min   Extension in Lying 10   Flexion in Lying 10   Lumbar Weight 63 lbs   Repetitions 20   Rating of Perceived Exertion 5             Hamstring stretching supine with cord, 3 sets 10 sec  LTRs 10x5"  EIL x10  PPT 15 x 5"  Supine 90-90 marching x 10  Bridges 20x3" with green theraband  Pallof press red sports cord x15  Side steps with RTB 2 laps 30 ft      Peripheral muscle strengthening which included one set of 15-20 repetitions at a slow " and controlled 10-13 second per rep pace focused on strengthening supporting musculature in order to improve body mechanics and functional mobility. Patient and therapist focused on proper form during treatment to ensure optimal strengthening of each targeted muscle group.  Machines utilized included:Torso rotation, Leg Ext, Leg Curl, Chest Press, and Rowing  Triceps, Biceps, Hip Abd, Hip Add, and Leg Press      Rico participated in dynamic functional therapeutic activities to improve functional performance and simulate household and community activities for 10 minutes. The following activities were included:    STS 7.5KB 2 x 10   Step up with contralateral hip flexion x 10 ea       Rico received manual therapy techniques for -  minutes. The following activities were included:        Pt given cold pack for 5 minutes to low back in z-lie.    Patient Education and Home Exercises   Home exercises include:  EIL, PPT, 90/90 march, bridges, ext in standing, hamstring stretching  Cardio program (V5): -  7/1/24  Lifting education (V11): -  Posture/Lumbar roll: acquired  [facility] (date given): -  Equipment at home/gym membership: [facility], [employer]     Education provided:   - PT role and POC  - HEP-addition of hamstring stretching and ext in standing    Written Home Exercises Provided: Patient instructed to cont prior HEP.  Exercises were reviewed and Rico was able to demonstrate them prior to the end of the session.  Rico demonstrated good  understanding of the education provided.     See EMR under Patient Instructions for exercises provided 7/1/24    Assessment   Rico presents to session without c/o pain. He tolerated session well with tactile cues provided due to difficulty initiating PPT. He demonstrates improved stability during SL step ups with less UE reliance noted. MedX lumbar was performed with resistance maintained at 63ft lbs, completing 20 reps  at 5/10 RPE. No issues with peripherals. Continue with program er pt tolerance.     Patient is making good progress towards established goals.  Pt will continue to benefit from skilled outpatient physical therapy to address the deficits stated in the impairment chart, provide pt/family education and to maximize pt's level of independence in the home and community environment.     Anticipated Barriers for therapy: none  Pt's spiritual, cultural and educational needs considered and pt agreeable to plan of care and goals as stated below:     GOALS: Pt is in agreement with the following goals.     Short term goals:  6 weeks or 10 visits   - Pt will demonstrate increased lumbar MedX ROM by at least 8 degrees from the initial ROM value with improvements noted in functional ROM and ability to perform ADLs. Appropriate and Ongoing  - Pt will demonstrate increased MedX average isometric strength value by 10% from initial test resulting in improved ability to perform bending, lifting, and carrying activities safely, confidently. Appropriate and Ongoing  - Pt will report a reduction in worst pain score by 1-2 points for improved tolerance for recreational running. Appropriate and Ongoing  - Pt able to perform HEP correctly with minimal cueing or supervision from therapist to encourage independent management of symptoms. Appropriate and Ongoing     Long term goals: 10 weeks or 20 visits   - Pt will demonstrate increased lumbar MedX ROM by at least 12 degrees from initial ROM value, resulting in improved ability to perform functional forward bending while standing and sitting. Appropriate and Ongoing  - Pt will demonstrate increased MedX average isometric strength value by 20% from initial test resulting in improved ability to perform bending, lifting, and carrying activities safely and confidently. Appropriate and Ongoing  - Pt to demonstrate ability to independently control and reduce their pain through posture positioning and  mechanical movements throughout a typical day. Appropriate and Ongoing  - Pt will demonstrate reduced pain and improved functional outcomes as reported on the FOTO by reaching an intake score of >/= 96% functional ability in order to demonstrate subjective improvement in patient's condition. . Appropriate and Ongoing  - Pt will demonstrate independence with the HEP at discharge. Appropriate and Ongoing  - Pt will achieve core strength that is within functional limits in order to return to running 1 mile/day with no greater than 1/10 low back provocation. Appropriate and Ongoing    Plan     Continue with established Plan of Care towards established PT goals.     Therapist: Jessi Kearney, PTA  7/10/2024

## 2024-07-15 ENCOUNTER — CLINICAL SUPPORT (OUTPATIENT)
Dept: REHABILITATION | Facility: OTHER | Age: 79
End: 2024-07-15
Payer: MEDICARE

## 2024-07-15 DIAGNOSIS — M54.50 CHRONIC MIDLINE LOW BACK PAIN WITHOUT SCIATICA: Primary | ICD-10-CM

## 2024-07-15 DIAGNOSIS — R29.898 DECREASED STRENGTH OF TRUNK AND BACK: ICD-10-CM

## 2024-07-15 DIAGNOSIS — G89.29 CHRONIC MIDLINE LOW BACK PAIN WITHOUT SCIATICA: Primary | ICD-10-CM

## 2024-07-15 PROCEDURE — 97530 THERAPEUTIC ACTIVITIES: CPT

## 2024-07-15 PROCEDURE — 97112 NEUROMUSCULAR REEDUCATION: CPT

## 2024-07-15 PROCEDURE — 97110 THERAPEUTIC EXERCISES: CPT

## 2024-07-15 NOTE — PROGRESS NOTES
"[facility]  Physical Therapy Treatment Note     Name: Rico Park Jr.  Clinic Number: [id number]    Therapy Diagnosis:   Encounter Diagnoses   Name Primary?    Chronic midline low back pain without sciatica Yes    Decreased strength of trunk and back        Physician: Osmar Stokes MD    Visit Date: 7/15/2024    Medical Diagnosis from Referral: M54.50 (ICD-10-CM) - Recurrent low back pain  Evaluation Date: 6/10/2024  Authorization Period Expiration: 5/20/2024  Plan of Care Expiration: 8/15/2024  Reassessment Due: 8/15//24  Visit # / Visits authorized: 10/20 ([employer])    MedX testing visit 2    PTA Visit #: 0/5     Time In: 9:30 am   Time Out: 10:30 am    Total Billable Time: 45 minutes (1:1)  Total Treatment Time: 60 min   INSURANCE and OUTCOMES: Fee for Service with FOTO Outcomes 3/3    Precautions: standard    Pattern of pain determined: 1 PEP  Subjective   Rico Park Jr. Reports extended time /s inc LBP including going jogging over the weekend.  Patient note some concern for balance and enjoys the standing exercises in tx.  Patient report HEP compliance noting they are getting easier.  Patient report using lumbar roll in home office.      Patient reports tolerating previous visit well /c no c/o of excess discomfort  Patient reports their pain to be  0 /10 on a 0-10 scale with 0 being no pain and 10 being the worst pain imaginable.  Pain Location: Midline LB     Occupation:   Leisure: Run 1 mile/day, read     Pt goals:  "To get rid of the pain when I run, and get tools to correct my posture".     Objective      Lumbar  Isometric Testing on Med X equipment: Testing administered by PT    Test Initial Baseline Midpoint Final   Date 6/12/2024 07/15/24    ROM 3-39 deg 0 - 51 deg    Max Peak Torque 95  109    Min Peak Torque 33  45    Flex/Ext Ratio 2.9:1 2.4     % variance  normative data 64% below 61% below    % change from initial test N/A visit 1 4 "        MOVEMENT LOSS - Lumbar    Norms ROM Loss Initial 7/1/24   Flexion Fingers touch toes, sacral angle >/= 70 deg, uniform spinal curvature, posterior weight shift  major loss Mod loss   Extension ASIS surpasses toes, spine of scapulae surpasses heels, uniform spinal curve major loss Mod loss   Side glide Right   major loss Mod loss   Side glide Left   major loss Mod loss   Rotation Right PT observes contralateral shoulder major loss Mod loss   Rotation Left PT observes contralateral shoulder major loss Mod loss      Outcomes:  Intake Score: 89%  Visit 6 Score: 89%  Visit 10 Score: 94%  Discharge Score:  Goal Score: 94%   Treatment     Rico received the treatments listed below:        Rico participated in neuromuscular re-education activities to improve balance, coordination, proprioception, motor control and/or posture for 15 minutes. The following activities were included:     Medical MedX Treatment as follows:  MedX testing performed day 10: Patient  received neuromuscular education to engage spinal musculature correctly for motor control and engagement of musculature including the MedX exercise component and practice and standard testing. MedX dynamic exercise and baseline isometric test performed with instructions to guide the patient safely through the testing procedure. Patient instructed to perform isometric test correctly and safely while building to an optimal force with a pain-free effort. Patient also instructed that they should feel support/pressure from MedX restraints but no pain/discomfort, and encouraged to report any pain to therapist. Patient demonstrated appropriate understanding of information and tolerance of test.  Education regarding purpose of test, safety during test given, and reviewed possible more soreness and strategies.           7/15/2024     9:56 AM   [facility]   Visit Number 10   VAS Pain Rating 0   Treadmill Time (in min.) 5 min   Extension in Lying 10   Lumbar  "Extension Seat Pad 2   Femur Restraint 5   Top Dead Center 24   Counterweight 127   Lumbar Flexion 51   Lumbar Extension 0   Lumbar Peak Torque 109 ft. lbs.   Min Torque 45   Test Percent Below Normative Data 61 %   Test Percent Gain in Strength from Initial  4 %   Lumbar Weight 55 lbs   Repetitions 5   Ice - Z Lie (in min.) 0         PPT 10 max tactile and verbal cue for core activation  TA activation /c Tball in hooklying 10 x 5 sec hold         Rico participated in therapeutic exercises to develop strength, endurance, ROM, flexibility, posture, and core stabilization for 35 minutes including:    TM /c jog 5 min    HEP demonstrate include:   LTRs 10x5"  EIL x 10 cue for controlled     (See neuro re-ed)   Supine 90-90 marching x 10  Bridges 20x3" with green theraband    NP to allow time for IM testing, plan return next visit   Bridges 20x3" with green theraband  Hamstring stretching supine with cord, 3 sets 10 sec  Pallof press red sports cord x15  Side steps with RTB 2 laps 30 ft      Peripheral muscle strengthening which included one set of 15-20 repetitions at a slow and controlled 10-13 second per rep pace focused on strengthening supporting musculature in order to improve body mechanics and functional mobility. Patient and therapist focused on proper form during treatment to ensure optimal strengthening of each targeted muscle group.  Machines utilized included:Torso rotation, Leg Ext, Leg Curl, Chest Press, and RowingTriceps, Biceps, Hip Abd, Hip Add, and Leg Press      Rico participated in dynamic functional therapeutic activities to improve functional performance and simulate household and community activities for 10 minutes. The following activities were included:    Step /c contralateral hip flexion x 10 ea, /c 7.5 KB in hand  Step up to 2 in  /c contralateral hip flexion x 10 ea, /c 7.5 KB in hand  Step up to 4 in  /c contralateral hip flexion x 10 ea, /c 7.5 KB in hand     Rico received " manual therapy techniques for -  minutes. The following activities were included:        Pt given cold pack for 5 minutes to low back in z-lie.    Patient Education and Home Exercises   Home exercises include:  EIL,   PPT,   90/90 march,   surya,   ext in standing,   hamstring stretching    Cardio program (V5):  7/1/24  Lifting education (V11): -  Posture/Lumbar roll: acquired, uses in home office  Fridge Magnet Discharge handout (date given): -  Equipment at home/gym membership: Yes, [employer]     Education provided:   - definition of directional preference to promote inc sx self management     Written Home Exercises Provided: Patient instructed to cont prior HEP.  Exercises were reviewed and Rico was able to demonstrate them prior to the end of the session.  Rico demonstrated good  understanding of the education provided.     See EMR under Patient Instructions for exercises provided 7/1/24    Assessment     Patient has attended 10 visits at [facility] which included MD evaluation, PT evaluation with isometric testing, and physical therapy treatment including HEP instruction, education, aerobic activity, dynamic strengthening on MedX equipment for the spine, and whole body strengthening on MedX equipment with increasing resistance. Patient demonstrates increased ability to reduce symptoms, improve posture, improve ROM, and improve strength, as stated below:    -Improved postural recognition using lumbar roll at home and work.  Patient demonstrate mobility HEP /c accuracy thereby meeting associated goal.  However, Patient continue to need sig cue during PPT for TA activation indicating possible min performance in HEP.  Patient encouraged to continue PPT in HEP.  Tx include education on definition of directional preference to promote inc sx self management.  Patient subjective report indicate improved  LB tolerance to jogging and meeting associated goal.   -Improved  lumbar ROM, initially on MedX test 3 - 39 deg and currently 0 - 51 deg thereby meeting associated goal.  -Improved strength lumbar MedX isometric test with 4 average improvement  vs eval.  However, patient remains 61% below norms indicating appropriateness of continue HB programming.  noted with reduced pain noted by patient.  -Initial outcome tool score 89 and current score 94% indicating reduced pain and improved function and mirroring subjective report.        Patient is making good progress towards established goals.  Pt will continue to benefit from skilled outpatient physical therapy to address the deficits stated in the impairment chart, provide pt/family education and to maximize pt's level of independence in the home and community environment.     Anticipated Barriers for therapy: none  Pt's spiritual, cultural and educational needs considered and pt agreeable to plan of care and goals as stated below:     GOALS: Pt is in agreement with the following goals.     Short term goals:  6 weeks or 10 visits   - Pt will demonstrate increased lumbar MedX ROM by at least 6 degrees from the initial ROM value with improvements noted in functional ROM and ability to perform ADLs. MET  - Pt will demonstrate increased MedX average isometric strength value by 10% from initial test resulting in improved ability to perform bending, lifting, and carrying activities safely, confidently. Appropriate and Ongoing  - Pt will report a reduction in worst pain score by 1-2 points for improved tolerance for recreational running.  MET  - Pt able to perform HEP correctly with minimal cueing or supervision from therapist to encourage independent management of symptoms.  MET     Long term goals: 10 weeks or 20 visits   - Pt will demonstrate increased lumbar MedX ROM by at least 12 degrees from initial ROM value, resulting in improved ability to perform functional forward bending while standing and sitting.  MET  - Pt will demonstrate  increased MedX average isometric strength value by 20% from initial test resulting in improved ability to perform bending, lifting, and carrying activities safely and confidently. Appropriate and Ongoing  - Pt to demonstrate ability to independently control and reduce their pain through posture positioning and mechanical movements throughout a typical day. Appropriate and Ongoing  - Pt will demonstrate reduced pain and improved functional outcomes as reported on the FOTO by reaching an intake score of >/= 96% functional ability in order to demonstrate subjective improvement in patient's condition. . Appropriate and Ongoing  - Pt will demonstrate independence with the HEP at discharge. Appropriate and Ongoing  - Pt will achieve core strength that is within functional limits in order to return to running 1 mile/day with no greater than 1/10 low back provocation. MET 07/15/24    Plan     Continue with established Plan of Care towards established PT goals.     Therapist: Ranjith Benoit, PT  7/15/2024

## 2024-07-17 ENCOUNTER — CLINICAL SUPPORT (OUTPATIENT)
Dept: REHABILITATION | Facility: OTHER | Age: 79
End: 2024-07-17
Payer: MEDICARE

## 2024-07-17 DIAGNOSIS — G89.29 CHRONIC MIDLINE LOW BACK PAIN WITHOUT SCIATICA: Primary | ICD-10-CM

## 2024-07-17 DIAGNOSIS — M54.50 CHRONIC MIDLINE LOW BACK PAIN WITHOUT SCIATICA: Primary | ICD-10-CM

## 2024-07-17 DIAGNOSIS — R29.898 DECREASED STRENGTH OF TRUNK AND BACK: ICD-10-CM

## 2024-07-17 PROCEDURE — 97110 THERAPEUTIC EXERCISES: CPT | Mod: CQ

## 2024-07-17 NOTE — PROGRESS NOTES
"[facility]  Physical Therapy Treatment Note     Name: Rico Park Jr.  Clinic Number: [id number]    Therapy Diagnosis:   Encounter Diagnoses   Name Primary?    Chronic midline low back pain without sciatica Yes    Decreased strength of trunk and back          Physician: Osmar Stokes MD    Visit Date: 7/17/2024    Medical Diagnosis from Referral: M54.50 (ICD-10-CM) - Recurrent low back pain  Evaluation Date: 6/10/2024  Authorization Period Expiration: 5/20/2024  Plan of Care Expiration: 8/15/2024  Reassessment Due: 8/15//24  Visit # / Visits authorized: 10/20 ([employer])    MedX testing visit 2    PTA Visit #: 1/5     Time In: 9:00  am   Time Out: 10:00  am    Total Billable Time: 30 minutes (1:1)  Total Treatment Time: 60 min   INSURANCE and OUTCOMES: Fee for Service with FOTO Outcomes 3/3    Precautions: standard    Pattern of pain determined: 1 PEP  Subjective   Rico Park JrGeena Denies having any pain upon entry.     Patient reports tolerating previous visit well /c no c/o of excess discomfort  Patient reports their pain to be  0 /10 on a 0-10 scale with 0 being no pain and 10 being the worst pain imaginable.  Pain Location: Midline LB     Occupation:   Leisure: Run 1 mile/day, read     Pt goals:  "To get rid of the pain when I run, and get tools to correct my posture".     Objective      Lumbar  Isometric Testing on Med X equipment: Testing administered by PT    Test Initial Baseline Midpoint Final   Date 6/12/2024 07/15/24    ROM 3-39 deg 0 - 51 deg    Max Peak Torque 95  109    Min Peak Torque 33  45    Flex/Ext Ratio 2.9:1 2.4     % variance  normative data 64% below 61% below    % change from initial test N/A visit 1 4        MOVEMENT LOSS - Lumbar    Norms ROM Loss Initial 7/1/24   Flexion Fingers touch toes, sacral angle >/= 70 deg, uniform spinal curvature, posterior weight shift  major loss Mod loss   Extension ASIS surpasses toes, spine of " scapulae surpasses heels, uniform spinal curve major loss Mod loss   Side glide Right   major loss Mod loss   Side glide Left   major loss Mod loss   Rotation Right PT observes contralateral shoulder major loss Mod loss   Rotation Left PT observes contralateral shoulder major loss Mod loss      Outcomes:  Intake Score: 89%  Visit 6 Score: 89%  Visit 10 Score: 94%  Discharge Score:  Goal Score: 94%   Treatment     Rico received the treatments listed below:        Rico participated in neuromuscular re-education activities to improve balance, coordination, proprioception, motor control and/or posture for minutes. The following activities were included:     Medical MedX Treatment as follows:  MedX testing performed day 10: Patient  received neuromuscular education to engage spinal musculature correctly for motor control and engagement of musculature including the MedX exercise component and practice and standard testing. MedX dynamic exercise and baseline isometric test performed with instructions to guide the patient safely through the testing procedure. Patient instructed to perform isometric test correctly and safely while building to an optimal force with a pain-free effort. Patient also instructed that they should feel support/pressure from MedX restraints but no pain/discomfort, and encouraged to report any pain to therapist. Patient demonstrated appropriate understanding of information and tolerance of test.  Education regarding purpose of test, safety during test given, and reviewed possible more soreness and strategies.             7/17/2024     9:18 AM   [facility]   Visit Number 11   VAS Pain Rating 0   Treadmill Time (in min.) 5 min   Extension in Lying 10   Flexion in Lying 10   Lumbar Weight 66 lbs   Repetitions 15   Rating of Perceived Exertion 5         PPT 10 max tactile and verbal cue for core activation-NP  TA activation /c Tball in hooklying 10 x 5 sec hold -NP        Rico  "participated in therapeutic exercises to develop strength, endurance, ROM, flexibility, posture, and core stabilization for 35 minutes including:    TM /c jog 5 min    HEP demonstrate include:   LTRs 10x5"  EIL x 10 cue for controlled     (See neuro re-ed)   Supine 90-90 marching x 10  Bridges 20x3" with green theraband    NP to allow time for IM testing, plan return next visit   Hamstring stretching supine with cord, 3 sets 10 sec  Pallof press red sports cord x15  Side steps with RTB 2 laps 30 ft      Peripheral muscle strengthening which included one set of 15-20 repetitions at a slow and controlled 10-13 second per rep pace focused on strengthening supporting musculature in order to improve body mechanics and functional mobility. Patient and therapist focused on proper form during treatment to ensure optimal strengthening of each targeted muscle group.  Machines utilized included:Torso rotation, Leg Ext, Leg Curl, Chest Press, and RowingTriceps, Biceps, Hip Abd, Hip Add, and Leg Press      Rico participated in dynamic functional therapeutic activities to improve functional performance and simulate household and community activities for 0 minutes. The following activities were included:    Step /c contralateral hip flexion x 10 ea, /c 7.5 KB in hand  Step up to 2 in  /c contralateral hip flexion x 10 ea, /c 7.5 KB in hand  Step up to 4 in  /c contralateral hip flexion x 10 ea, /c 7.5 KB in hand     Rico received manual therapy techniques for -  minutes. The following activities were included:        Pt given cold pack for 5 minutes to low back in z-lie.    Patient Education and Home Exercises   Home exercises include:  EIL,   PPT,   90/90 march,   bridges,   ext in standing,   hamstring stretching    Cardio program (V5):  7/1/24  Lifting education (V11): -  Posture/Lumbar roll: acquired, uses in home office  Fridge Magnet Discharge handout (date given): -  Equipment at home/gym membership: Yes, [employer]     Education provided:   - definition of directional preference to promote inc sx self management     Written Home Exercises Provided: Patient instructed to cont prior HEP.  Exercises were reviewed and Rico was able to demonstrate them prior to the end of the session.  Rico demonstrated good  understanding of the education provided.     See EMR under Patient Instructions for exercises provided 7/1/24    Assessment     Patient presents to session w/o reports of pain . He tolerated session well w/o adverse effects, presenting overall improved strength and flexibility with therex. Lift training handout reviewed and issued with verbal understanding of technique. MedX lumbar performed with good response with increase resistance to 66 lbs/ft, achieving 15 reps at 5/10 RPE. Peripheral muscle strength training completed w/o adverse effects. Continue program per pt tolerance.              Patient demonstrates increased ability to reduce symptoms, improve posture, improve ROM, and improve strength, as stated below:    -Improved postural recognition using lumbar roll at home and work.  Patient demonstrate mobility HEP /c accuracy thereby meeting associated goal.  However, Patient continue to need sig cue during PPT for TA activation indicating possible min performance in HEP.  Patient encouraged to continue PPT in HEP.  Tx include education on definition of directional preference to promote inc sx self management.  Patient subjective report indicate improved  LB tolerance to jogging and meeting associated goal.   -Improved lumbar ROM, initially on MedX test 3 - 39 deg and currently 0 - 51 deg thereby meeting associated goal.  -Improved strength lumbar MedX isometric test with 4 average improvement  vs eval.  However, patient remains 61% below norms indicating appropriateness of continue HB programming.  noted with reduced pain noted by patient.  -Initial outcome tool score 89 and current  score 94% indicating reduced pain and improved function and mirroring subjective report.        Patient is making good progress towards established goals.  Pt will continue to benefit from skilled outpatient physical therapy to address the deficits stated in the impairment chart, provide pt/family education and to maximize pt's level of independence in the home and community environment.     Anticipated Barriers for therapy: none  Pt's spiritual, cultural and educational needs considered and pt agreeable to plan of care and goals as stated below:     GOALS: Pt is in agreement with the following goals.     Short term goals:  6 weeks or 10 visits   - Pt will demonstrate increased lumbar MedX ROM by at least 6 degrees from the initial ROM value with improvements noted in functional ROM and ability to perform ADLs. MET  - Pt will demonstrate increased MedX average isometric strength value by 10% from initial test resulting in improved ability to perform bending, lifting, and carrying activities safely, confidently. Appropriate and Ongoing  - Pt will report a reduction in worst pain score by 1-2 points for improved tolerance for recreational running.  MET  - Pt able to perform HEP correctly with minimal cueing or supervision from therapist to encourage independent management of symptoms.  MET     Long term goals: 10 weeks or 20 visits   - Pt will demonstrate increased lumbar MedX ROM by at least 12 degrees from initial ROM value, resulting in improved ability to perform functional forward bending while standing and sitting.  MET  - Pt will demonstrate increased MedX average isometric strength value by 20% from initial test resulting in improved ability to perform bending, lifting, and carrying activities safely and confidently. Appropriate and Ongoing  - Pt to demonstrate ability to independently control and reduce their pain through posture positioning and mechanical movements throughout a typical day. Appropriate and  Ongoing  - Pt will demonstrate reduced pain and improved functional outcomes as reported on the FOTO by reaching an intake score of >/= 96% functional ability in order to demonstrate subjective improvement in patient's condition. . Appropriate and Ongoing  - Pt will demonstrate independence with the HEP at discharge. Appropriate and Ongoing  - Pt will achieve core strength that is within functional limits in order to return to running 1 mile/day with no greater than 1/10 low back provocation. MET 07/15/24    Plan     Continue with established Plan of Care towards established PT goals.     Therapist: Jessi Kearney, PTA  7/17/2024

## 2024-07-22 ENCOUNTER — CLINICAL SUPPORT (OUTPATIENT)
Dept: REHABILITATION | Facility: OTHER | Age: 79
End: 2024-07-22
Payer: MEDICARE

## 2024-07-22 DIAGNOSIS — G89.29 CHRONIC MIDLINE LOW BACK PAIN WITHOUT SCIATICA: Primary | ICD-10-CM

## 2024-07-22 DIAGNOSIS — R29.898 DECREASED STRENGTH OF TRUNK AND BACK: ICD-10-CM

## 2024-07-22 DIAGNOSIS — M54.50 CHRONIC MIDLINE LOW BACK PAIN WITHOUT SCIATICA: Primary | ICD-10-CM

## 2024-07-22 PROCEDURE — 97110 THERAPEUTIC EXERCISES: CPT

## 2024-07-22 PROCEDURE — 97112 NEUROMUSCULAR REEDUCATION: CPT

## 2024-07-22 NOTE — PROGRESS NOTES
"[facility]  Physical Therapy Treatment Note     Name: Rico Park Jr.  Clinic Number: [id number]    Therapy Diagnosis:   Encounter Diagnoses   Name Primary?    Chronic midline low back pain without sciatica Yes    Decreased strength of trunk and back          Physician: Osmar Stokes MD    Visit Date: 7/22/2024    Medical Diagnosis from Referral: M54.50 (ICD-10-CM) - Recurrent low back pain  Evaluation Date: 6/10/2024  Authorization Period Expiration: 5/20/2024  Plan of Care Expiration: 8/15/2024  Reassessment Due: 8/15//24  Visit # / Visits authorized: 12/20 ([employer])    MedX testing visit 2    PTA Visit #: 0/5     Time In: 9:00  am   Time Out: 9:50  am    Total Billable Time: 30 minutes (1:1)  Total Treatment Time: 60 min   INSURANCE and OUTCOMES: Fee for Service with FOTO Outcomes 3/3    Precautions: standard    Pattern of pain determined: 1 PEP  Subjective   Rico Park JrGeena Denies having any pain upon entry.     Patient reports tolerating previous visit well /c no c/o of excess discomfort  Patient reports their pain to be  0 /10 on a 0-10 scale with 0 being no pain and 10 being the worst pain imaginable.  Pain Location: Midline LB     Occupation:   Leisure: Run 1 mile/day, read     Pt goals:  "To get rid of the pain when I run, and get tools to correct my posture".     Objective      Lumbar  Isometric Testing on Med X equipment: Testing administered by PT    Test Initial Baseline Midpoint Final   Date 6/12/2024 07/15/24    ROM 3-39 deg 0 - 51 deg    Max Peak Torque 95  109    Min Peak Torque 33  45    Flex/Ext Ratio 2.9:1 2.4     % variance  normative data 64% below 61% below    % change from initial test N/A visit 1 4        MOVEMENT LOSS - Lumbar    Norms ROM Loss Initial 7/1/24   Flexion Fingers touch toes, sacral angle >/= 70 deg, uniform spinal curvature, posterior weight shift  major loss Mod loss   Extension ASIS surpasses toes, spine of " scapulae surpasses heels, uniform spinal curve major loss Mod loss   Side glide Right   major loss Mod loss   Side glide Left   major loss Mod loss   Rotation Right PT observes contralateral shoulder major loss Mod loss   Rotation Left PT observes contralateral shoulder major loss Mod loss      Outcomes:  Intake Score: 89%  Visit 6 Score: 89%  Visit 10 Score: 94%  Discharge Score:  Goal Score: 94%   Treatment     Rico received the treatments listed below:        Rico participated in neuromuscular re-education activities to improve balance, coordination, proprioception, motor control and/or posture for 15 minutes. The following activities were included:    Pallof press red sports cord x15  Side steps with RTB 2 laps 30 ft  Monster walks with RTB 2 laps 30 ft  Sit to stand 7.5# x15     Medical MedX Treatment as follows:  MedX testing performed day 10: Patient  received neuromuscular education to engage spinal musculature correctly for motor control and engagement of musculature including the MedX exercise component and practice and standard testing. MedX dynamic exercise and baseline isometric test performed with instructions to guide the patient safely through the testing procedure. Patient instructed to perform isometric test correctly and safely while building to an optimal force with a pain-free effort. Patient also instructed that they should feel support/pressure from MedX restraints but no pain/discomfort, and encouraged to report any pain to therapist. Patient demonstrated appropriate understanding of information and tolerance of test.  Education regarding purpose of test, safety during test given, and reviewed possible more soreness and strategies.             7/22/2024     9:27 AM   [facility]   Visit Number 12   VAS Pain Rating 0   Treadmill Time (in min.) 5 min   Extension in Lying 10   Lumbar Weight 66 lbs   Repetitions 18   Rating of Perceived Exertion 4             PPT 10 max tactile  "and verbal cue for core activation-NP  TA activation /c Tball in hooklying 10 x 5 sec hold -NP        Rico participated in therapeutic exercises to develop strength, endurance, ROM, flexibility, posture, and core stabilization for 35 minutes including:    TM /c jog 5 min    HEP demonstrate include:   LTRs 10x5"  EIL x 10 cue for controlled   +prone hip extension x15  (See neuro re-ed)   Supine 90-90 marching x 10  Bridges 10x3" with green theraband  +Bridge with march x10      NP to allow time for IM testing, plan return next visit   Hamstring stretching supine with cord, 3 sets 10 sec    Side steps with RTB 2 laps 30 ft      Peripheral muscle strengthening which included one set of 15-20 repetitions at a slow and controlled 10-13 second per rep pace focused on strengthening supporting musculature in order to improve body mechanics and functional mobility. Patient and therapist focused on proper form during treatment to ensure optimal strengthening of each targeted muscle group.  Machines utilized included:Torso rotation, Leg Ext, Leg Curl, Chest Press, and RowingTriceps, Biceps, Hip Abd, Hip Add, and Leg Press      Rico participated in dynamic functional therapeutic activities to improve functional performance and simulate household and community activities for 0 minutes. The following activities were included:    Step /c contralateral hip flexion x 10 ea, /c 7.5 KB in hand  Step up to 2 in  /c contralateral hip flexion x 10 ea, /c 7.5 KB in hand  Step up to 4 in  /c contralateral hip flexion x 10 ea, /c 7.5 KB in hand     Rico received manual therapy techniques for -  minutes. The following activities were included:        Pt given cold pack for 5 minutes to low back in z-lie.    Patient Education and Home Exercises   Home exercises include:  EIL,   PPT,   90/90 march,   bridges,   ext in standing,   hamstring stretching    Cardio program (V5):  7/1/24  Lifting education (V11): -  Posture/Lumbar roll: " acquired, uses in home office  Fridge Magnet Discharge handout (date given): -  Equipment at home/gym membership: Yes, [employer]     Education provided:   - definition of directional preference to promote inc sx self management     Written Home Exercises Provided: Patient instructed to cont prior HEP.  Exercises were reviewed and Rico was able to demonstrate them prior to the end of the session.  Rico demonstrated good  understanding of the education provided.     See EMR under Patient Instructions for exercises provided prior visit    Assessment     Patient presents to session w/o reports of pain. Resumed mobility and strengthening exercises as well as additional functional strengthening exercises that he was able to complete with appropriate challenge and cues for proper execution of exercises. MedX lumbar performed with no change in resistance at 66 lbs/ft, achieving 18 reps at 4/10 RPE. Peripheral muscle strength training completed w/o adverse effects. Towards the end of therapy and circuit strength training he reported feeling light headed and having blurred vision. BP was taking and it was 72/58. He was sitting in the torso rotation feeling unsafe to move to a table to elevate the legs. He nodded off and had to wake him up. He admits to not eating this morning and usually has a protein shake. A rapid response was called and he did not want to get checked out but reports feeling better by the time they arrived. Continue program per pt tolerance.         Patient is making good progress towards established goals.  Pt will continue to benefit from skilled outpatient physical therapy to address the deficits stated in the impairment chart, provide pt/family education and to maximize pt's level of independence in the home and community environment.     Anticipated Barriers for therapy: none  Pt's spiritual, cultural and educational needs considered and pt agreeable to plan of care  and goals as stated below:     GOALS: Pt is in agreement with the following goals.     Short term goals:  6 weeks or 10 visits   - Pt will demonstrate increased lumbar MedX ROM by at least 6 degrees from the initial ROM value with improvements noted in functional ROM and ability to perform ADLs. MET  - Pt will demonstrate increased MedX average isometric strength value by 10% from initial test resulting in improved ability to perform bending, lifting, and carrying activities safely, confidently. Appropriate and Ongoing  - Pt will report a reduction in worst pain score by 1-2 points for improved tolerance for recreational running.  MET  - Pt able to perform HEP correctly with minimal cueing or supervision from therapist to encourage independent management of symptoms.  MET     Long term goals: 10 weeks or 20 visits   - Pt will demonstrate increased lumbar MedX ROM by at least 12 degrees from initial ROM value, resulting in improved ability to perform functional forward bending while standing and sitting.  MET  - Pt will demonstrate increased MedX average isometric strength value by 20% from initial test resulting in improved ability to perform bending, lifting, and carrying activities safely and confidently. Appropriate and Ongoing  - Pt to demonstrate ability to independently control and reduce their pain through posture positioning and mechanical movements throughout a typical day. Appropriate and Ongoing  - Pt will demonstrate reduced pain and improved functional outcomes as reported on the FOTO by reaching an intake score of >/= 96% functional ability in order to demonstrate subjective improvement in patient's condition. . Appropriate and Ongoing  - Pt will demonstrate independence with the HEP at discharge. Appropriate and Ongoing  - Pt will achieve core strength that is within functional limits in order to return to running 1 mile/day with no greater than 1/10 low back provocation. MET 07/15/24    Plan      Continue with established Plan of Care towards established PT goals.     Therapist: Minnie Price, PT  7/22/2024

## 2024-07-24 ENCOUNTER — CLINICAL SUPPORT (OUTPATIENT)
Dept: REHABILITATION | Facility: OTHER | Age: 79
End: 2024-07-24
Payer: MEDICARE

## 2024-07-24 DIAGNOSIS — M54.50 CHRONIC MIDLINE LOW BACK PAIN WITHOUT SCIATICA: Primary | ICD-10-CM

## 2024-07-24 DIAGNOSIS — R29.898 DECREASED STRENGTH OF TRUNK AND BACK: ICD-10-CM

## 2024-07-24 DIAGNOSIS — G89.29 CHRONIC MIDLINE LOW BACK PAIN WITHOUT SCIATICA: Primary | ICD-10-CM

## 2024-07-24 PROCEDURE — 97112 NEUROMUSCULAR REEDUCATION: CPT | Mod: CQ

## 2024-07-24 PROCEDURE — 97110 THERAPEUTIC EXERCISES: CPT | Mod: CQ

## 2024-07-24 NOTE — PROGRESS NOTES
"[facility]  Physical Therapy Treatment Note     Name: Rcio Park Jr.  Clinic Number: [id number]    Therapy Diagnosis:   Encounter Diagnoses   Name Primary?    Chronic midline low back pain without sciatica Yes    Decreased strength of trunk and back          Physician: Osmar Stokes MD    Visit Date: 7/24/2024    Medical Diagnosis from Referral: M54.50 (ICD-10-CM) - Recurrent low back pain  Evaluation Date: 6/10/2024  Authorization Period Expiration: 5/20/2024  Plan of Care Expiration: 8/15/2024  Reassessment Due: 8/15//24  Visit # / Visits authorized: 12/20 ([place])    MedX testing visit 2    PTA Visit #: 1/5     Time In: 9:30 am   Time Out: 10:30  am    Total Billable Time: 30 minutes (1:1)  Total Treatment Time: 60 min   INSURANCE and OUTCOMES: Fee for Service with FOTO Outcomes 3/3    Precautions: standard    Pattern of pain determined: 1 PEP  Subjective   Rico Park JrGeena Denies having any pain upon entry.     Patient reports tolerating previous visit well /c no c/o of excess discomfort  Patient reports their pain to be  0 /10 on a 0-10 scale with 0 being no pain and 10 being the worst pain imaginable.  Pain Location: Midline LB     Occupation:   Leisure: Run 1 mile/day, read     Pt goals:  "To get rid of the pain when I run, and get tools to correct my posture".     Objective      Lumbar  Isometric Testing on Med X equipment: Testing administered by PT    Test Initial Baseline Midpoint Final   Date 6/12/2024 07/15/24    ROM 3-39 deg 0 - 51 deg    Max Peak Torque 95  109    Min Peak Torque 33  45    Flex/Ext Ratio 2.9:1 2.4     % variance  normative data 64% below 61% below    % change from initial test N/A visit 1 4        MOVEMENT LOSS - Lumbar    Norms ROM Loss Initial 7/1/24   Flexion Fingers touch toes, sacral angle >/= 70 deg, uniform spinal curvature, posterior weight shift  major loss Mod loss   Extension ASIS surpasses toes, spine of " scapulae surpasses heels, uniform spinal curve major loss Mod loss   Side glide Right   major loss Mod loss   Side glide Left   major loss Mod loss   Rotation Right PT observes contralateral shoulder major loss Mod loss   Rotation Left PT observes contralateral shoulder major loss Mod loss      Outcomes:  Intake Score: 89%  Visit 6 Score: 89%  Visit 10 Score: 94%  Discharge Score:  Goal Score: 94%   Treatment     Rico received the treatments listed below:        Rico participated in neuromuscular re-education activities to improve balance, coordination, proprioception, motor control and/or posture for 15 minutes. The following activities were included:    Pallof press red sports cord x15  Side steps with RTB 2 laps 30 ft  Monster walks with RTB 2 laps 30 ft  Sit to stand 10#KB  x15     Medical MedX Treatment as follows:  MedX testing performed day 10: Patient  received neuromuscular education to engage spinal musculature correctly for motor control and engagement of musculature including the MedX exercise component and practice and standard testing. MedX dynamic exercise and baseline isometric test performed with instructions to guide the patient safely through the testing procedure. Patient instructed to perform isometric test correctly and safely while building to an optimal force with a pain-free effort. Patient also instructed that they should feel support/pressure from MedX restraints but no pain/discomfort, and encouraged to report any pain to therapist. Patient demonstrated appropriate understanding of information and tolerance of test.  Education regarding purpose of test, safety during test given, and reviewed possible more soreness and strategies.             7/24/2024     9:23 AM   [facility]   Visit Number 13   VAS Pain Rating 0   Treadmill Time (in min.) 5 min   Flexion in Lying 10   Lumbar Weight 66 lbs   Repetitions 15   Rating of Perceived Exertion 5                PPT 10 max tactile  "and verbal cue for core activation-NP  TA activation /c Tball in hooklying 10 x 5 sec hold -NP        Rico participated in therapeutic exercises to develop strength, endurance, ROM, flexibility, posture, and core stabilization for 35 minutes including:    TM /c jog 5 min    HEP demonstrate include:   LTRs 10x5"  EIL x 10 cue for controlled   +prone hip extension x15  (See neuro re-ed)   Supine 90-90 marching x 10  Bridges 20x3" with green theraband  +Bridge with march x10 with GTB      NP to allow time for IM testing, plan return next visit   Hamstring stretching supine with cord, 3 sets 10 sec    Side steps with RTB 2 laps 30 ft      Peripheral muscle strengthening which included one set of 15-20 repetitions at a slow and controlled 10-13 second per rep pace focused on strengthening supporting musculature in order to improve body mechanics and functional mobility. Patient and therapist focused on proper form during treatment to ensure optimal strengthening of each targeted muscle group.  Machines utilized included:Torso rotation, Leg Ext, Leg Curl, Chest Press, and RowingTriceps, Biceps, Hip Abd, Hip Add, and Leg Press      Rico participated in dynamic functional therapeutic activities to improve functional performance and simulate household and community activities for 0 minutes. The following activities were included:    Step /c contralateral hip flexion x 10 ea, /c 7.5 KB in hand  Step up to 2 in  /c contralateral hip flexion x 10 ea, /c 7.5 KB in hand  Step up to 4 in  /c contralateral hip flexion x 10 ea, /c 7.5 KB in hand     Rico received manual therapy techniques for -  minutes. The following activities were included:        Pt given cold pack for 5 minutes to low back in z-lie.    Patient Education and Home Exercises   Home exercises include:  EIL,   PPT,   90/90 march,   bridges,   ext in standing,   hamstring stretching    Cardio program (V5):  7/1/24  Lifting education (V11): " -  Posture/Lumbar roll: acquired, uses in home office  Fridge Magnet Discharge handout (date given): -  Equipment at home/gym membership: Yes, [employer] [place]     Education provided:   - definition of directional preference to promote inc sx self management     Written Home Exercises Provided: Patient instructed to cont prior HEP.  Exercises were reviewed and Rico was able to demonstrate them prior to the end of the session.  Rico demonstrated good  understanding of the education provided.     See EMR under Patient Instructions for exercises provided prior visit    Assessment     Patient presents to session w/o reports of pain. Resumed mobility and strengthening exercises that he was able to complete with appropriate challenge with cues provided for proper execution of exercises. MedX lumbar performed with no change in resistance at 66 lbs/ft, achieving 15 reps at 5/10 RPE. Peripheral muscle strength training completed w/o adverse effects. Continue program per pt tolerance.         Patient is making good progress towards established goals.  Pt will continue to benefit from skilled outpatient physical therapy to address the deficits stated in the impairment chart, provide pt/family education and to maximize pt's level of independence in the home and community environment.     Anticipated Barriers for therapy: none  Pt's spiritual, cultural and educational needs considered and pt agreeable to plan of care and goals as stated below:     GOALS: Pt is in agreement with the following goals.     Short term goals:  6 weeks or 10 visits   - Pt will demonstrate increased lumbar MedX ROM by at least 6 degrees from the initial ROM value with improvements noted in functional ROM and ability to perform ADLs. MET  - Pt will demonstrate increased MedX average isometric strength value by 10% from initial test resulting in improved ability to perform bending, lifting, and carrying activities safely,  confidently. Appropriate and Ongoing  - Pt will report a reduction in worst pain score by 1-2 points for improved tolerance for recreational running.  MET  - Pt able to perform HEP correctly with minimal cueing or supervision from therapist to encourage independent management of symptoms.  MET     Long term goals: 10 weeks or 20 visits   - Pt will demonstrate increased lumbar MedX ROM by at least 12 degrees from initial ROM value, resulting in improved ability to perform functional forward bending while standing and sitting.  MET  - Pt will demonstrate increased MedX average isometric strength value by 20% from initial test resulting in improved ability to perform bending, lifting, and carrying activities safely and confidently. Appropriate and Ongoing  - Pt to demonstrate ability to independently control and reduce their pain through posture positioning and mechanical movements throughout a typical day. Appropriate and Ongoing  - Pt will demonstrate reduced pain and improved functional outcomes as reported on the FOTO by reaching an intake score of >/= 96% functional ability in order to demonstrate subjective improvement in patient's condition. . Appropriate and Ongoing  - Pt will demonstrate independence with the HEP at discharge. Appropriate and Ongoing  - Pt will achieve core strength that is within functional limits in order to return to running 1 mile/day with no greater than 1/10 low back provocation. MET 07/15/24    Plan     Continue with established Plan of Care towards established PT goals.     Therapist: Jessi Kearney, PTA  7/24/2024

## 2024-08-07 ENCOUNTER — CLINICAL SUPPORT (OUTPATIENT)
Dept: REHABILITATION | Facility: OTHER | Age: 79
End: 2024-08-07
Payer: MEDICARE

## 2024-08-07 DIAGNOSIS — R29.898 DECREASED STRENGTH OF TRUNK AND BACK: ICD-10-CM

## 2024-08-07 DIAGNOSIS — G89.29 CHRONIC MIDLINE LOW BACK PAIN WITHOUT SCIATICA: Primary | ICD-10-CM

## 2024-08-07 DIAGNOSIS — M54.50 CHRONIC MIDLINE LOW BACK PAIN WITHOUT SCIATICA: Primary | ICD-10-CM

## 2024-08-07 PROCEDURE — 97110 THERAPEUTIC EXERCISES: CPT

## 2024-08-07 PROCEDURE — 97112 NEUROMUSCULAR REEDUCATION: CPT

## 2024-08-09 ENCOUNTER — CLINICAL SUPPORT (OUTPATIENT)
Dept: REHABILITATION | Facility: OTHER | Age: 79
End: 2024-08-09
Payer: MEDICARE

## 2024-08-09 DIAGNOSIS — R29.898 DECREASED STRENGTH OF TRUNK AND BACK: ICD-10-CM

## 2024-08-09 DIAGNOSIS — G89.29 CHRONIC MIDLINE LOW BACK PAIN WITHOUT SCIATICA: Primary | ICD-10-CM

## 2024-08-09 DIAGNOSIS — M54.50 CHRONIC MIDLINE LOW BACK PAIN WITHOUT SCIATICA: Primary | ICD-10-CM

## 2024-08-09 PROCEDURE — 97110 THERAPEUTIC EXERCISES: CPT

## 2024-08-23 ENCOUNTER — CLINICAL SUPPORT (OUTPATIENT)
Dept: REHABILITATION | Facility: OTHER | Age: 79
End: 2024-08-23
Payer: MEDICARE

## 2024-08-23 DIAGNOSIS — G89.29 CHRONIC MIDLINE LOW BACK PAIN WITHOUT SCIATICA: Primary | ICD-10-CM

## 2024-08-23 DIAGNOSIS — R29.898 DECREASED STRENGTH OF TRUNK AND BACK: ICD-10-CM

## 2024-08-23 DIAGNOSIS — M54.50 CHRONIC MIDLINE LOW BACK PAIN WITHOUT SCIATICA: Primary | ICD-10-CM

## 2024-08-23 PROCEDURE — 97112 NEUROMUSCULAR REEDUCATION: CPT | Mod: CQ

## 2024-08-23 PROCEDURE — 97110 THERAPEUTIC EXERCISES: CPT | Mod: CQ

## 2024-08-23 NOTE — PROGRESS NOTES
"[facility]  Physical Therapy Treatment Note     Name: Rico Park Jr.  Clinic Number: [id number]    Therapy Diagnosis:   Encounter Diagnoses   Name Primary?    Chronic midline low back pain without sciatica Yes    Decreased strength of trunk and back        Physician: Osmar Stokes MD    Visit Date: 8/23/2024    Medical Diagnosis from Referral: M54.50 (ICD-10-CM) - Recurrent low back pain  Evaluation Date: 6/10/2024  Authorization Period Expiration: 5/20/2024  Plan of Care Expiration: 8/15/2024  Reassessment Due: 8/15/24  Visit # / Visits authorized: 14/20 ([place])    MedX testing visit 2    PTA Visit #: 1/5     Time In: 9:00 am   Time Out: 9:50 am    Total Billable Time: 45 minutes (1:1)  Total Treatment Time: 50  INSURANCE and OUTCOMES: Fee for Service with FOTO Outcomes 3/3    Precautions: standard    Pattern of pain determined: 1 PEP  Subjective   Rico Park Jr. Reports less intense pain with running since beginning therapy. He denies having pain this morning.     Patient reports tolerating previous visit well /c no c/o of excess discomfort  Patient reports their pain to be  0 /10 on a 0-10 scale with 0 being no pain and 10 being the worst pain imaginable.  Pain Location: Midline LB     Occupation:   Leisure: Run 1 mile/day, read     Pt goals:  "To get rid of the pain when I run, and get tools to correct my posture".     Objective      Lumbar  Isometric Testing on Med X equipment: Testing administered by PT    Test Initial Baseline Midpoint Final   Date 6/12/2024 07/15/24    ROM 3-39 deg 0 - 51 deg    Max Peak Torque 95  109    Min Peak Torque 33  45    Flex/Ext Ratio 2.9:1 2.4     % variance  normative data 64% below 61% below    % change from initial test N/A visit 1 4        MOVEMENT LOSS - Lumbar    Norms ROM Loss Initial 7/1/24   Flexion Fingers touch toes, sacral angle >/= 70 deg, uniform spinal curvature, posterior weight shift  major " "loss Mod loss   Extension ASIS surpasses toes, spine of scapulae surpasses heels, uniform spinal curve major loss Mod loss   Side glide Right   major loss Mod loss   Side glide Left   major loss Mod loss   Rotation Right PT observes contralateral shoulder major loss Mod loss   Rotation Left PT observes contralateral shoulder major loss Mod loss      Outcomes:  Intake Score: 89%  Visit 6 Score: 89%  Visit 10 Score: 94%  Discharge Score:  Goal Score: 94%   Treatment     Rico received the treatments listed below:        Rico participated in neuromuscular re-education activities to improve balance, coordination, proprioception, motor control and/or posture for 15 minutes. The following activities were included:    Pallof press green sports cord 15x5 ea  Standing hip abd RTB 2x10x3" ea  [place] carry 15# 2x200 ft ea  Quad LE ext 10x5" ea  90/90 heel tap 2x10-NP    NP:   Sit to stand 10#KB  x15  PPT 10 max tactile and verbal cue for core activation-NP  TA activation /c Tball in hooklying 10 x 5 sec hold -NP        Rico participated in therapeutic exercises to develop strength, endurance, ROM, flexibility, posture, and core stabilization for 30 minutes including:        TM /c jog 5 min      LTRs 10x5"  EIL x 10 cue for controlled   prone hip extension x15  Prone Swimmer x10  Supine 90-90 marching x 10  Bridges 10x3" with green theraband-np  Bridge with march x10 with GTB    Medical MedX Treatment as follows:  MedX testing performed day 10: Patient  received neuromuscular education to engage spinal musculature correctly for motor control and engagement of musculature including the MedX exercise component and practice and standard testing. MedX dynamic exercise and baseline isometric test performed with instructions to guide the patient safely through the testing procedure. Patient instructed to perform isometric test correctly and safely while building to an optimal force with a pain-free effort. Patient also " instructed that they should feel support/pressure from MedX restraints but no pain/discomfort, and encouraged to report any pain to therapist. Patient demonstrated appropriate understanding of information and tolerance of test.  Education regarding purpose of test, safety during test given, and reviewed possible more soreness and strategies.               8/23/2024     1:40 PM   [facility]   Visit Number 16   VAS Pain Rating 0   Treadmill Time (in min.) 5 min   Lumbar Weight 70 lbs   Repetitions 20   Rating of Perceived Exertion 4           NP:    Hamstring stretching supine with cord, 3 sets 10 sec  Side steps with RTB 2 laps 30 ft      Peripheral muscle strengthening which included one set of 15-20 repetitions at a slow and controlled 10-13 second per rep pace focused on strengthening supporting musculature in order to improve body mechanics and functional mobility. Patient and therapist focused on proper form during treatment to ensure optimal strengthening of each targeted muscle group.  Machines utilized included:Torso rotation, Leg Ext, Leg Curl, Chest Press, and RowingTriceps, Biceps, Hip Abd, Hip Add, and Leg Press      Rico participated in dynamic functional therapeutic activities to improve functional performance and simulate household and community activities for 0 minutes. The following activities were included:    Step /c contralateral hip flexion x 10 ea, /c 7.5 KB in hand  Step up to 2 in  /c contralateral hip flexion x 10 ea, /c 7.5 KB in hand  Step up to 4 in  /c contralateral hip flexion x 10 ea, /c 7.5 KB in hand     Rico received manual therapy techniques for -  minutes. The following activities were included:        Pt given cold pack for 5 minutes to low back in z-lie.    Patient Education and Home Exercises   Home exercises include:  EIL,   PPT,   90/90 march,   surya,   ext in standing,   hamstring stretching    Cardio program (V5):  7/1/24  Lifting education (V11):  -  Posture/Lumbar roll: acquired, uses in home office  Fridge Magnet Discharge handout (date given): -  Equipment at home/gym membership: Yes, [employer]     Education provided:   - definition of directional preference to promote inc sx self management     Written Home Exercises Provided: Patient instructed to cont prior HEP.  Exercises were reviewed and Rico was able to demonstrate them prior to the end of the session.  Rico demonstrated good  understanding of the education provided.     See EMR under Patient Instructions for exercises provided prior visit    Assessment     Avery presents with no complaints of pain in the low back pain and feeling better overall. Progressed dynamic lumbopelvic mobility, stability and strengthening exercises with good tolerance and no adverse effects. Prone swimmers for upper erector muscles and thoracic muscles performed with difficulty/weakness noted. Medx resistance maintained at 70 ft/lbs and pt was able to complete 20 reps with 4/10 RPE. Peripheral muscle strengthening completed with good response.     Patient is making good progress towards established goals.  Pt will continue to benefit from skilled outpatient physical therapy to address the deficits stated in the impairment chart, provide pt/family education and to maximize pt's level of independence in the home and community environment.     Anticipated Barriers for therapy: none  Pt's spiritual, cultural and educational needs considered and pt agreeable to plan of care and goals as stated below:     GOALS: Pt is in agreement with the following goals.     Short term goals:  6 weeks or 10 visits   - Pt will demonstrate increased lumbar MedX ROM by at least 6 degrees from the initial ROM value with improvements noted in functional ROM and ability to perform ADLs. MET  - Pt will demonstrate increased MedX average isometric strength value by 10% from initial test resulting in improved ability  to perform bending, lifting, and carrying activities safely, confidently. Appropriate and Ongoing  - Pt will report a reduction in worst pain score by 1-2 points for improved tolerance for recreational running.  MET  - Pt able to perform HEP correctly with minimal cueing or supervision from therapist to encourage independent management of symptoms.  MET     Long term goals: 10 weeks or 20 visits   - Pt will demonstrate increased lumbar MedX ROM by at least 12 degrees from initial ROM value, resulting in improved ability to perform functional forward bending while standing and sitting.  MET  - Pt will demonstrate increased MedX average isometric strength value by 20% from initial test resulting in improved ability to perform bending, lifting, and carrying activities safely and confidently. Appropriate and Ongoing  - Pt to demonstrate ability to independently control and reduce their pain through posture positioning and mechanical movements throughout a typical day. Appropriate and Ongoing  - Pt will demonstrate reduced pain and improved functional outcomes as reported on the FOTO by reaching an intake score of >/= 96% functional ability in order to demonstrate subjective improvement in patient's condition. . Appropriate and Ongoing  - Pt will demonstrate independence with the HEP at discharge. Appropriate and Ongoing  - Pt will achieve core strength that is within functional limits in order to return to running 1 mile/day with no greater than 1/10 low back provocation. MET 07/15/24    Plan     Continue with established Plan of Care towards established PT goals.     Therapist: Jessi Kearney, PTA  8/23/2024

## 2024-08-26 ENCOUNTER — CLINICAL SUPPORT (OUTPATIENT)
Dept: REHABILITATION | Facility: OTHER | Age: 79
End: 2024-08-26
Payer: MEDICARE

## 2024-08-26 DIAGNOSIS — M54.50 CHRONIC MIDLINE LOW BACK PAIN WITHOUT SCIATICA: Primary | ICD-10-CM

## 2024-08-26 DIAGNOSIS — G89.29 CHRONIC MIDLINE LOW BACK PAIN WITHOUT SCIATICA: Primary | ICD-10-CM

## 2024-08-26 DIAGNOSIS — R29.898 DECREASED STRENGTH OF TRUNK AND BACK: ICD-10-CM

## 2024-08-26 PROCEDURE — 97110 THERAPEUTIC EXERCISES: CPT

## 2024-08-26 NOTE — PLAN OF CARE
"[facility]  Physical Therapy Treatment Note     Name: Rico Park Jr.  Clinic Number: [id number]    Therapy Diagnosis:   Encounter Diagnoses   Name Primary?    Chronic midline low back pain without sciatica Yes    Decreased strength of trunk and back        Physician: Osmar Stokes MD    Visit Date: 8/26/2024    Medical Diagnosis from Referral: M54.50 (ICD-10-CM) - Recurrent low back pain  Evaluation Date: 6/10/2024  Authorization Period Expiration: 5/20/2024  Plan of Care Expiration: 9/26/2024  Reassessment Due: 9/26/2024  Visit # / Visits authorized: 15/20 (Avery)    MedX testing visit 2    PTA Visit #: 0/5     Time In: 9:00 am   Time Out: 9:50 am    Total Billable Time: 45 minutes (1:1)  Total Treatment Time: 50  INSURANCE and OUTCOMES: Fee for Service with FOTO Outcomes 3/3    Precautions: standard    Pattern of pain determined: 1 PEP  Subjective   Rico Park Jr. Reports the back pain as been less frequent, he had a little pain after running but that is becoming more rare    Patient reports tolerating previous visit well /c no c/o of excess discomfort  Patient reports their pain to be  0 /10 on a 0-10 scale with 0 being no pain and 10 being the worst pain imaginable.  Pain Location: Midline LB     Occupation:   Leisure: Run 1 mile/day, read     Pt goals:  "To get rid of the pain when I run, and get tools to correct my posture".     Objective      Lumbar  Isometric Testing on Med X equipment: Testing administered by PT    Test Initial Baseline Midpoint Final   Date 6/12/2024 07/15/24    ROM 3-39 deg 0 - 51 deg    Max Peak Torque 95  109    Min Peak Torque 33  45    Flex/Ext Ratio 2.9:1 2.4     % variance  normative data 64% below 61% below    % change from initial test N/A visit 1 4        MOVEMENT LOSS - Lumbar    Norms ROM Loss Initial 7/1/24 8/26/2024   Flexion Fingers touch toes, sacral angle >/= 70 deg, uniform spinal curvature, posterior " weight shift  major loss Mod loss Mod loss   Extension ASIS surpasses toes, spine of scapulae surpasses heels, uniform spinal curve major loss Mod loss Min loss   Side glide Right   major loss Mod loss Min loss   Side glide Left   major loss Mod loss Mos loss   Rotation Right PT observes contralateral shoulder major loss Mod loss Min loss   Rotation Left PT observes contralateral shoulder major loss Mod loss Min loss      Outcomes:  Intake Score: 89%  Visit 6 Score: 89%  Visit 10 Score: 94%  Discharge Score:  Goal Score: 94%     Assessment     Avery presents with no complaints of pain in the low back pain and feeling better overall. Progressed dynamic lumbopelvic mobility, stability and strengthening exercises with good tolerance and no adverse effects. Prone swimmers for upper erector muscles and thoracic muscles performed with difficulty/weakness noted. Medx resistance maintained at 74 ft/lbs and pt was able to complete 14 reps with 4/10 RPE. Peripheral muscle strengthening completed with good response.     Patient is making good progress towards established goals.  Pt will continue to benefit from skilled outpatient physical therapy to address the deficits stated in the impairment chart, provide pt/family education and to maximize pt's level of independence in the home and community environment.     Anticipated Barriers for therapy: none  Pt's spiritual, cultural and educational needs considered and pt agreeable to plan of care and goals as stated below:     GOALS: Pt is in agreement with the following goals.     Short term goals:  6 weeks or 10 visits   - Pt will demonstrate increased lumbar MedX ROM by at least 6 degrees from the initial ROM value with improvements noted in functional ROM and ability to perform ADLs. MET  - Pt will demonstrate increased MedX average isometric strength value by 10% from initial test resulting in improved ability to perform bending, lifting, and carrying activities safely,  confidently. Appropriate and Ongoing  - Pt will report a reduction in worst pain score by 1-2 points for improved tolerance for recreational running.  MET  - Pt able to perform HEP correctly with minimal cueing or supervision from therapist to encourage independent management of symptoms.  MET     Long term goals: 10 weeks or 20 visits   - Pt will demonstrate increased lumbar MedX ROM by at least 12 degrees from initial ROM value, resulting in improved ability to perform functional forward bending while standing and sitting.  MET  - Pt will demonstrate increased MedX average isometric strength value by 20% from initial test resulting in improved ability to perform bending, lifting, and carrying activities safely and confidently. Appropriate and Ongoing  - Pt to demonstrate ability to independently control and reduce their pain through posture positioning and mechanical movements throughout a typical day. Appropriate and Ongoing  - Pt will demonstrate reduced pain and improved functional outcomes as reported on the FOTO by reaching an intake score of >/= 96% functional ability in order to demonstrate subjective improvement in patient's condition. . Appropriate and Ongoing  - Pt will demonstrate independence with the HEP at discharge. Appropriate and Ongoing  - Pt will achieve core strength that is within functional limits in order to return to running 1 mile/day with no greater than 1/10 low back provocation. MET 07/15/24    Plan     Continue with established Plan of Care up to 4 weeks towards established PT goals.     Therapist: Minnie Price, PT  8/26/2024

## 2024-08-26 NOTE — PROGRESS NOTES
"[facility]  Physical Therapy Treatment Note     Name: Rico Park Jr.  Clinic Number: [id number]    Therapy Diagnosis:   Encounter Diagnoses   Name Primary?    Chronic midline low back pain without sciatica Yes    Decreased strength of trunk and back        Physician: Osmar Stokes MD    Visit Date: 8/26/2024    Medical Diagnosis from Referral: M54.50 (ICD-10-CM) - Recurrent low back pain  Evaluation Date: 6/10/2024  Authorization Period Expiration: 5/20/2024  Plan of Care Expiration: 9/26/2024  Reassessment Due: 9/26/2024  Visit # / Visits authorized: 15/20 (Avery)    MedX testing visit 2    PTA Visit #: 0/5     Time In: 9:00 am   Time Out: 9:50 am    Total Billable Time: 45 minutes (1:1)  Total Treatment Time: 50  INSURANCE and OUTCOMES: Fee for Service with FOTO Outcomes 3/3    Precautions: standard    Pattern of pain determined: 1 PEP  Subjective   Rico Park Jr. Reports the back pain as been less frequent, he had a little pain after running but that is becoming more rare    Patient reports tolerating previous visit well /c no c/o of excess discomfort  Patient reports their pain to be  0 /10 on a 0-10 scale with 0 being no pain and 10 being the worst pain imaginable.  Pain Location: Midline LB     Occupation:   Leisure: Run 1 mile/day, read     Pt goals:  "To get rid of the pain when I run, and get tools to correct my posture".     Objective      Lumbar  Isometric Testing on Med X equipment: Testing administered by PT    Test Initial Baseline Midpoint Final   Date 6/12/2024 07/15/24    ROM 3-39 deg 0 - 51 deg    Max Peak Torque 95  109    Min Peak Torque 33  45    Flex/Ext Ratio 2.9:1 2.4     % variance  normative data 64% below 61% below    % change from initial test N/A visit 1 4        MOVEMENT LOSS - Lumbar    Norms ROM Loss Initial 7/1/24 8/26/2024   Flexion Fingers touch toes, sacral angle >/= 70 deg, uniform spinal curvature, posterior " "weight shift  major loss Mod loss Mod loss   Extension ASIS surpasses toes, spine of scapulae surpasses heels, uniform spinal curve major loss Mod loss Min loss   Side glide Right   major loss Mod loss Min loss   Side glide Left   major loss Mod loss Mos loss   Rotation Right PT observes contralateral shoulder major loss Mod loss Min loss   Rotation Left PT observes contralateral shoulder major loss Mod loss Min loss      Outcomes:  Intake Score: 89%  Visit 6 Score: 89%  Visit 10 Score: 94%  Discharge Score:  Goal Score: 94%   Treatment     Rico received the treatments listed below:        Rico participated in neuromuscular re-education activities to improve balance, coordination, proprioception, motor control and/or posture for 15 minutes. The following activities were included:    Pallof press green sports cord 15x5 ea  Standing hip abd RTB 2x10x3" ea  [place] carry 15# 2x200 ft ea  Quad LE ext 10x5" ea  90/90 heel tap 2x10-NP    NP:   Sit to stand 10#KB  x15  PPT 10 max tactile and verbal cue for core activation-NP  TA activation /c Tball in hooklying 10 x 5 sec hold -NP        Rico participated in therapeutic exercises to develop strength, endurance, ROM, flexibility, posture, and core stabilization for 30 minutes including:        TM /c jog 5 min      LTRs 10x5"  EIL x 10 cue for controlled   prone hip extension x15  Prone Swimmer x10  Supine 90-90 marching x 10  Bridges 10x3" with green theraband-np  Bridge with march x10 with GTB    Medical MedX Treatment as follows:  MedX testing performed day 10: Patient  received neuromuscular education to engage spinal musculature correctly for motor control and engagement of musculature including the MedX exercise component and practice and standard testing. MedX dynamic exercise and baseline isometric test performed with instructions to guide the patient safely through the testing procedure. Patient instructed to perform isometric test correctly and safely " while building to an optimal force with a pain-free effort. Patient also instructed that they should feel support/pressure from MedX restraints but no pain/discomfort, and encouraged to report any pain to therapist. Patient demonstrated appropriate understanding of information and tolerance of test.  Education regarding purpose of test, safety during test given, and reviewed possible more soreness and strategies.           8/26/2024     9:22 AM   [facility]   Visit Number 17   VAS Pain Rating 0   Treadmill Time (in min.) 5 min   Extension in Lying 10   Lumbar Weight 74 lbs   Repetitions 14   Rating of Perceived Exertion 4.5   Ice - Z Lie (in min.) 0           NP:    Hamstring stretching supine with cord, 3 sets 10 sec  Side steps with RTB 2 laps 30 ft      Peripheral muscle strengthening which included one set of 15-20 repetitions at a slow and controlled 10-13 second per rep pace focused on strengthening supporting musculature in order to improve body mechanics and functional mobility. Patient and therapist focused on proper form during treatment to ensure optimal strengthening of each targeted muscle group.  Machines utilized included:Torso rotation, Leg Ext, Leg Curl, Chest Press, and RowingTriceps, Biceps, Hip Abd, Hip Add, and Leg Press      Rico participated in dynamic functional therapeutic activities to improve functional performance and simulate household and community activities for 0 minutes. The following activities were included:    Step /c contralateral hip flexion x 10 ea, /c 7.5 KB in hand  Step up to 2 in  /c contralateral hip flexion x 10 ea, /c 7.5 KB in hand  Step up to 4 in  /c contralateral hip flexion x 10 ea, /c 7.5 KB in hand     Rico received manual therapy techniques for -  minutes. The following activities were included:        Pt given cold pack for 5 minutes to low back in z-lie.    Patient Education and Home Exercises   Home exercises include:  EIL,   PPT,   90/90  surya watts,   ext in standing,   hamstring stretching    Cardio program (V5):  7/1/24  Lifting education (V11): -  Posture/Lumbar roll: acquired, uses in home office  Fridge Magnet Discharge handout (date given): -  Equipment at home/gym membership: Yes, [employer] [place] [employer]     Education provided:   - definition of directional preference to promote inc sx self management     Written Home Exercises Provided: Patient instructed to cont prior HEP.  Exercises were reviewed and Rico was able to demonstrate them prior to the end of the session.  Rico demonstrated good  understanding of the education provided.     See EMR under Patient Instructions for exercises provided prior visit    Assessment     Avery presents with no complaints of pain in the low back pain and feeling better overall. Progressed dynamic lumbopelvic mobility, stability and strengthening exercises with good tolerance and no adverse effects. Prone swimmers for upper erector muscles and thoracic muscles performed with difficulty/weakness noted. Medx resistance maintained at 74 ft/lbs and pt was able to complete 14 reps with 4/10 RPE. Peripheral muscle strengthening completed with good response.     Patient is making good progress towards established goals.  Pt will continue to benefit from skilled outpatient physical therapy to address the deficits stated in the impairment chart, provide pt/family education and to maximize pt's level of independence in the home and community environment.     Anticipated Barriers for therapy: none  Pt's spiritual, cultural and educational needs considered and pt agreeable to plan of care and goals as stated below:     GOALS: Pt is in agreement with the following goals.     Short term goals:  6 weeks or 10 visits   - Pt will demonstrate increased lumbar MedX ROM by at least 6 degrees from the initial ROM value with improvements noted in functional ROM and ability to perform ADLs. MET  - Pt  will demonstrate increased MedX average isometric strength value by 10% from initial test resulting in improved ability to perform bending, lifting, and carrying activities safely, confidently. Appropriate and Ongoing  - Pt will report a reduction in worst pain score by 1-2 points for improved tolerance for recreational running.  MET  - Pt able to perform HEP correctly with minimal cueing or supervision from therapist to encourage independent management of symptoms.  MET     Long term goals: 10 weeks or 20 visits   - Pt will demonstrate increased lumbar MedX ROM by at least 12 degrees from initial ROM value, resulting in improved ability to perform functional forward bending while standing and sitting.  MET  - Pt will demonstrate increased MedX average isometric strength value by 20% from initial test resulting in improved ability to perform bending, lifting, and carrying activities safely and confidently. Appropriate and Ongoing  - Pt to demonstrate ability to independently control and reduce their pain through posture positioning and mechanical movements throughout a typical day. Appropriate and Ongoing  - Pt will demonstrate reduced pain and improved functional outcomes as reported on the FOTO by reaching an intake score of >/= 96% functional ability in order to demonstrate subjective improvement in patient's condition. . Appropriate and Ongoing  - Pt will demonstrate independence with the HEP at discharge. Appropriate and Ongoing  - Pt will achieve core strength that is within functional limits in order to return to running 1 mile/day with no greater than 1/10 low back provocation. MET 07/15/24    Plan     Continue with established Plan of Care towards established PT goals.     Therapist: Minnie Price, PT  8/26/2024

## 2024-08-28 ENCOUNTER — LAB VISIT (OUTPATIENT)
Dept: LAB | Facility: OTHER | Age: 79
End: 2024-08-28
Attending: INTERNAL MEDICINE
Payer: MEDICARE

## 2024-08-28 ENCOUNTER — OFFICE VISIT (OUTPATIENT)
Dept: INTERNAL MEDICINE | Facility: CLINIC | Age: 79
End: 2024-08-28
Attending: INTERNAL MEDICINE
Payer: MEDICARE

## 2024-08-28 VITALS
BODY MASS INDEX: 21.21 KG/M2 | SYSTOLIC BLOOD PRESSURE: 110 MMHG | DIASTOLIC BLOOD PRESSURE: 70 MMHG | WEIGHT: 131.38 LBS

## 2024-08-28 DIAGNOSIS — D64.9 ANEMIA, UNSPECIFIED: ICD-10-CM

## 2024-08-28 DIAGNOSIS — Z00.00 ANNUAL PHYSICAL EXAM: Primary | ICD-10-CM

## 2024-08-28 DIAGNOSIS — D53.9 MACROCYTIC ANEMIA: ICD-10-CM

## 2024-08-28 DIAGNOSIS — R79.9 ABNORMAL FINDING OF BLOOD CHEMISTRY, UNSPECIFIED: ICD-10-CM

## 2024-08-28 DIAGNOSIS — Z00.00 ANNUAL PHYSICAL EXAM: ICD-10-CM

## 2024-08-28 DIAGNOSIS — N40.0 BENIGN PROSTATIC HYPERPLASIA WITHOUT LOWER URINARY TRACT SYMPTOMS: ICD-10-CM

## 2024-08-28 DIAGNOSIS — I10 BENIGN ESSENTIAL HTN: ICD-10-CM

## 2024-08-28 LAB
ALBUMIN SERPL BCP-MCNC: 3.9 G/DL (ref 3.5–5.2)
ALP SERPL-CCNC: 72 U/L (ref 55–135)
ALT SERPL W/O P-5'-P-CCNC: 19 U/L (ref 10–44)
ANION GAP SERPL CALC-SCNC: 13 MMOL/L (ref 8–16)
AST SERPL-CCNC: 31 U/L (ref 10–40)
BASOPHILS # BLD AUTO: 0.07 K/UL (ref 0–0.2)
BASOPHILS NFR BLD: 0.5 % (ref 0–1.9)
BILIRUB SERPL-MCNC: 0.5 MG/DL (ref 0.1–1)
BUN SERPL-MCNC: 17 MG/DL (ref 8–23)
CALCIUM SERPL-MCNC: 9.8 MG/DL (ref 8.7–10.5)
CHLORIDE SERPL-SCNC: 103 MMOL/L (ref 95–110)
CHOLEST SERPL-MCNC: 177 MG/DL (ref 120–199)
CHOLEST/HDLC SERPL: 2.3 {RATIO} (ref 2–5)
CO2 SERPL-SCNC: 22 MMOL/L (ref 23–29)
CREAT SERPL-MCNC: 1.6 MG/DL (ref 0.5–1.4)
DIFFERENTIAL METHOD BLD: ABNORMAL
EOSINOPHIL # BLD AUTO: 0.2 K/UL (ref 0–0.5)
EOSINOPHIL NFR BLD: 1.7 % (ref 0–8)
ERYTHROCYTE [DISTWIDTH] IN BLOOD BY AUTOMATED COUNT: 12.3 % (ref 11.5–14.5)
EST. GFR  (NO RACE VARIABLE): 44 ML/MIN/1.73 M^2
ESTIMATED AVG GLUCOSE: 97 MG/DL (ref 68–131)
FERRITIN SERPL-MCNC: 992 NG/ML (ref 20–300)
GLUCOSE SERPL-MCNC: 126 MG/DL (ref 70–110)
HBA1C MFR BLD: 5 % (ref 4–5.6)
HCT VFR BLD AUTO: 36.2 % (ref 40–54)
HDLC SERPL-MCNC: 76 MG/DL (ref 40–75)
HDLC SERPL: 42.9 % (ref 20–50)
HGB BLD-MCNC: 12.1 G/DL (ref 14–18)
IMM GRANULOCYTES # BLD AUTO: 0.06 K/UL (ref 0–0.04)
IMM GRANULOCYTES NFR BLD AUTO: 0.5 % (ref 0–0.5)
IRON SERPL-MCNC: 172 UG/DL (ref 45–160)
LDH SERPL L TO P-CCNC: 162 U/L (ref 110–260)
LDLC SERPL CALC-MCNC: 29.8 MG/DL (ref 63–159)
LYMPHOCYTES # BLD AUTO: 5 K/UL (ref 1–4.8)
LYMPHOCYTES NFR BLD: 37.9 % (ref 18–48)
MCH RBC QN AUTO: 33.7 PG (ref 27–31)
MCHC RBC AUTO-ENTMCNC: 33.4 G/DL (ref 32–36)
MCV RBC AUTO: 101 FL (ref 82–98)
MONOCYTES # BLD AUTO: 0.9 K/UL (ref 0.3–1)
MONOCYTES NFR BLD: 6.7 % (ref 4–15)
NEUTROPHILS # BLD AUTO: 6.9 K/UL (ref 1.8–7.7)
NEUTROPHILS NFR BLD: 52.7 % (ref 38–73)
NONHDLC SERPL-MCNC: 101 MG/DL
NRBC BLD-RTO: 0 /100 WBC
PLATELET # BLD AUTO: 315 K/UL (ref 150–450)
PMV BLD AUTO: 10.4 FL (ref 9.2–12.9)
POTASSIUM SERPL-SCNC: 4.2 MMOL/L (ref 3.5–5.1)
PROT SERPL-MCNC: 7.8 G/DL (ref 6–8.4)
RBC # BLD AUTO: 3.59 M/UL (ref 4.6–6.2)
RETICS/RBC NFR AUTO: 2 % (ref 0.4–2)
SATURATED IRON: 49 % (ref 20–50)
SODIUM SERPL-SCNC: 138 MMOL/L (ref 136–145)
TOTAL IRON BINDING CAPACITY: 348 UG/DL (ref 250–450)
TRANSFERRIN SERPL-MCNC: 235 MG/DL (ref 200–375)
TRIGL SERPL-MCNC: 356 MG/DL (ref 30–150)
TSH SERPL DL<=0.005 MIU/L-ACNC: 3.39 UIU/ML (ref 0.4–4)
WBC # BLD AUTO: 13.07 K/UL (ref 3.9–12.7)

## 2024-08-28 PROCEDURE — 83615 LACTATE (LD) (LDH) ENZYME: CPT | Performed by: INTERNAL MEDICINE

## 2024-08-28 PROCEDURE — 3074F SYST BP LT 130 MM HG: CPT | Mod: CPTII,S$GLB,, | Performed by: INTERNAL MEDICINE

## 2024-08-28 PROCEDURE — 99999 PR PBB SHADOW E&M-EST. PATIENT-LVL II: CPT | Mod: PBBFAC,,, | Performed by: INTERNAL MEDICINE

## 2024-08-28 PROCEDURE — 1159F MED LIST DOCD IN RCRD: CPT | Mod: CPTII,S$GLB,, | Performed by: INTERNAL MEDICINE

## 2024-08-28 PROCEDURE — 83036 HEMOGLOBIN GLYCOSYLATED A1C: CPT | Performed by: INTERNAL MEDICINE

## 2024-08-28 PROCEDURE — 85045 AUTOMATED RETICULOCYTE COUNT: CPT | Performed by: INTERNAL MEDICINE

## 2024-08-28 PROCEDURE — 84165 PROTEIN E-PHORESIS SERUM: CPT | Mod: 26,,, | Performed by: PATHOLOGY

## 2024-08-28 PROCEDURE — 80053 COMPREHEN METABOLIC PANEL: CPT | Performed by: INTERNAL MEDICINE

## 2024-08-28 PROCEDURE — 83540 ASSAY OF IRON: CPT | Performed by: INTERNAL MEDICINE

## 2024-08-28 PROCEDURE — 3078F DIAST BP <80 MM HG: CPT | Mod: CPTII,S$GLB,, | Performed by: INTERNAL MEDICINE

## 2024-08-28 PROCEDURE — 85025 COMPLETE CBC W/AUTO DIFF WBC: CPT | Performed by: INTERNAL MEDICINE

## 2024-08-28 PROCEDURE — 80061 LIPID PANEL: CPT | Performed by: INTERNAL MEDICINE

## 2024-08-28 PROCEDURE — 84443 ASSAY THYROID STIM HORMONE: CPT | Performed by: INTERNAL MEDICINE

## 2024-08-28 PROCEDURE — 82728 ASSAY OF FERRITIN: CPT | Performed by: INTERNAL MEDICINE

## 2024-08-28 PROCEDURE — 99214 OFFICE O/P EST MOD 30 MIN: CPT | Mod: S$GLB,,, | Performed by: INTERNAL MEDICINE

## 2024-08-28 PROCEDURE — 36415 COLL VENOUS BLD VENIPUNCTURE: CPT | Performed by: INTERNAL MEDICINE

## 2024-08-28 PROCEDURE — 84165 PROTEIN E-PHORESIS SERUM: CPT | Performed by: INTERNAL MEDICINE

## 2024-08-28 PROCEDURE — 1160F RVW MEDS BY RX/DR IN RCRD: CPT | Mod: CPTII,S$GLB,, | Performed by: INTERNAL MEDICINE

## 2024-08-28 NOTE — PROGRESS NOTES
Subjective:       Patient ID: Rico Park Jr. is a 78 y.o. male.    Chief Complaint: No chief complaint on file.    Here for annual exam      Hx of macrocytic anemia. Followed by Dr Sanabria. Last rec was for BM biopsy . Pt deferred due to being asymptomatic and is monitoring. He denies night sweats, unexplained weight loss, easy bruising/bleeding, recurrent infection, bone pains, malaise.      He exercises regularly and stamina is fine. Denies CP at rest or with exertion, dizziness with exertion, shortness of breath out of proportion to level of activity, frequent or sustained palpitations, orthopnea, PND, or LE edema.        ### HLD ###  Lipitor 40mg  Pt is tolerating statin without frequent muscle cramps or diffuse myalgias or weakness.      ### HTN ###  Amlodipine 5mg; losartan 100mg             Review of Systems   Constitutional:  Negative for appetite change, chills, fever and unexpected weight change.   HENT:  Negative for hearing loss, sore throat and trouble swallowing.    Eyes:  Negative for visual disturbance.   Respiratory:  Negative for cough, chest tightness and shortness of breath.    Cardiovascular:  Negative for chest pain and leg swelling.   Gastrointestinal:  Negative for abdominal pain, blood in stool, constipation, diarrhea, nausea and vomiting.   Endocrine: Negative for polydipsia and polyuria.   Genitourinary:  Negative for decreased urine volume, difficulty urinating, dysuria, frequency and urgency.   Musculoskeletal:  Negative for gait problem.   Skin:  Negative for rash.   Neurological:  Negative for dizziness and numbness.   Psychiatric/Behavioral:  The patient is not nervous/anxious.        Objective:      Vitals:    08/28/24 0804   BP: 110/70   Weight: 59.6 kg (131 lb 6.3 oz)      Physical Exam  Vitals and nursing note reviewed.   Constitutional:       General: He is not in acute distress.     Appearance: Normal appearance. He is well-developed.   HENT:      Head: Normocephalic and  atraumatic.      Mouth/Throat:      Pharynx: No oropharyngeal exudate.   Eyes:      General: No scleral icterus.     Conjunctiva/sclera: Conjunctivae normal.      Pupils: Pupils are equal, round, and reactive to light.   Neck:      Thyroid: No thyromegaly.   Cardiovascular:      Rate and Rhythm: Normal rate and regular rhythm.      Heart sounds: Normal heart sounds. No murmur heard.  Pulmonary:      Effort: Pulmonary effort is normal.      Breath sounds: Normal breath sounds. No wheezing or rales.   Abdominal:      General: There is no distension.   Musculoskeletal:         General: No tenderness.   Lymphadenopathy:      Cervical: No cervical adenopathy.   Skin:     General: Skin is warm and dry.   Neurological:      Mental Status: He is alert and oriented to person, place, and time.   Psychiatric:         Behavior: Behavior normal.         Assessment:       1. Annual physical exam    2. Benign prostatic hyperplasia without lower urinary tract symptoms    3. Benign essential HTN    4. Macrocytic anemia    5. Abnormal finding of blood chemistry, unspecified    6. Anemia, unspecified        Plan:       Diagnoses and all orders for this visit:    Annual physical exam  -     Comprehensive Metabolic Panel; Future  -     Lipid Panel; Future  -     TSH; Future  -     CBC Auto Differential; Future  -     Hemoglobin A1C; Future    Benign prostatic hyperplasia without lower urinary tract symptoms    Benign essential HTN   Controlled and asymptomatic.  Continue current Rx regimen.    Macrocytic anemia  -     PROTEIN ELECTROPHORESIS, SERUM; Future  -     LACTATE DEHYDROGENASE; Future  -     Ferritin; Future  -     IRON AND TIBC; Future  -     RETICULOCYTES; Future    Abnormal finding of blood chemistry, unspecified  -     Lipid Panel; Future  -     CBC Auto Differential; Future  -     Hemoglobin A1C; Future    Anemia, unspecified  -     TSH; Future    Pt given paper Rx to take to our pharmacy down stairs for following  vaccinations:     Visit today is associated with current or anticipated ongoing medical care related to this patient's single serious condition/complex condition of HTN, chronic anemia. The patient will return to see me as these issues will be followed longitudinally.       RTC in 1 year or sooner koki Pfeiffer MD  Internal Medicine-[facility]        Side effects of medication(s) were discussed in detail and patient voiced understanding.  Patient will call back for any issues or complications.

## 2024-08-29 LAB
ALBUMIN SERPL ELPH-MCNC: 4.27 G/DL (ref 3.35–5.55)
ALPHA1 GLOB SERPL ELPH-MCNC: 0.27 G/DL (ref 0.17–0.41)
ALPHA2 GLOB SERPL ELPH-MCNC: 0.81 G/DL (ref 0.43–0.99)
B-GLOBULIN SERPL ELPH-MCNC: 0.93 G/DL (ref 0.5–1.1)
GAMMA GLOB SERPL ELPH-MCNC: 1.12 G/DL (ref 0.67–1.58)
PROT SERPL-MCNC: 7.4 G/DL (ref 6–8.4)

## 2024-08-30 ENCOUNTER — CLINICAL SUPPORT (OUTPATIENT)
Dept: REHABILITATION | Facility: OTHER | Age: 79
End: 2024-08-30
Payer: MEDICARE

## 2024-08-30 DIAGNOSIS — R29.898 DECREASED STRENGTH OF TRUNK AND BACK: ICD-10-CM

## 2024-08-30 DIAGNOSIS — G89.29 CHRONIC MIDLINE LOW BACK PAIN WITHOUT SCIATICA: Primary | ICD-10-CM

## 2024-08-30 DIAGNOSIS — M54.50 CHRONIC MIDLINE LOW BACK PAIN WITHOUT SCIATICA: Primary | ICD-10-CM

## 2024-08-30 LAB — PATHOLOGIST INTERPRETATION SPE: NORMAL

## 2024-08-30 PROCEDURE — 97110 THERAPEUTIC EXERCISES: CPT | Mod: CQ

## 2024-08-30 PROCEDURE — 97112 NEUROMUSCULAR REEDUCATION: CPT | Mod: CQ

## 2024-08-30 NOTE — PROGRESS NOTES
Not overlying alarming but I do not like the trend. Let's plan on repeating labs in 6 months instead of 12.    Respectfully,  Osmar Pfeiffer

## 2024-08-30 NOTE — PROGRESS NOTES
"[facility]  Physical Therapy Treatment Note     Name: Rico Park Jr.  Clinic Number: [id number]    Therapy Diagnosis:   Encounter Diagnoses   Name Primary?    Chronic midline low back pain without sciatica Yes    Decreased strength of trunk and back          Physician: Osmar Stokes MD    Visit Date: 8/30/2024    Medical Diagnosis from Referral: M54.50 (ICD-10-CM) - Recurrent low back pain  Evaluation Date: 6/10/2024  Authorization Period Expiration: 5/20/2024  Plan of Care Expiration: 9/26/2024  Reassessment Due: 9/26/2024  Visit # / Visits authorized: 18/20 (A[place])    MedX testing visit 2    PTA Visit #: 1/5     Time In: 835  am   Time Out: 920 AM   Total Billable Time: 45 minutes   Total Treatment Time: 45  INSURANCE and OUTCOMES: Fee for Service with FOTO Outcomes 3/3    Precautions: standard    Pattern of pain determined: 1 PEP  Subjective   Rico Park Jr. Reports the he is doing good. No issues.     Patient reports tolerating previous visit well /c no c/o of excess discomfort  Patient reports their pain to be  0 /10 on a 0-10 scale with 0 being no pain and 10 being the worst pain imaginable.  Pain Location: Midline LB     Occupation:   Leisure: Run 1 mile/day, read     Pt goals:  "To get rid of the pain when I run, and get tools to correct my posture".     Objective      Lumbar  Isometric Testing on Med X equipment: Testing administered by PT    Test Initial Baseline Midpoint Final   Date 6/12/2024 07/15/24    ROM 3-39 deg 0 - 51 deg    Max Peak Torque 95  109    Min Peak Torque 33  45    Flex/Ext Ratio 2.9:1 2.4     % variance  normative data 64% below 61% below    % change from initial test N/A visit 1 4        MOVEMENT LOSS - Lumbar    Norms ROM Loss Initial 7/1/24 8/26/2024   Flexion Fingers touch toes, sacral angle >/= 70 deg, uniform spinal curvature, posterior weight shift  major loss Mod loss Mod loss   Extension ASIS surpasses " "toes, spine of scapulae surpasses heels, uniform spinal curve major loss Mod loss Min loss   Side glide Right   major loss Mod loss Min loss   Side glide Left   major loss Mod loss Mos loss   Rotation Right PT observes contralateral shoulder major loss Mod loss Min loss   Rotation Left PT observes contralateral shoulder major loss Mod loss Min loss      Outcomes:  Intake Score: 89%  Visit 6 Score: 89%  Visit 10 Score: 94%  Discharge Score:  Goal Score: 94%   Treatment     Rico received the treatments listed below:        Rico participated in neuromuscular re-education activities to improve balance, coordination, proprioception, motor control and/or posture for 15 minutes. The following activities were included:    Pallof press green sports cord 15x5 ea  Standing hip abd RTB 2x10x3" ea  [place] carry 15# 2x200 ft ea  Quad LE ext 10x5" ea      NP:   Sit to stand 10#KB  x15  PPT 10 max tactile and verbal cue for core activation-NP  TA activation /c Tball in hooklying 10 x 5 sec hold -NP        Rico participated in therapeutic exercises to develop strength, endurance, ROM, flexibility, posture, and core stabilization for 30 minutes including:        TM /c jog 5 min      LTRs 10x5"  EIL x 10 cue for controlled   prone hip extension x15  Prone Swimmer x10  Supine 90-90 marching x 10  Bridge with march x10 with GTB    Medical MedX Treatment as follows:  MedX testing performed day 10: Patient  received neuromuscular education to engage spinal musculature correctly for motor control and engagement of musculature including the MedX exercise component and practice and standard testing. MedX dynamic exercise and baseline isometric test performed with instructions to guide the patient safely through the testing procedure. Patient instructed to perform isometric test correctly and safely while building to an optimal force with a pain-free effort. Patient also instructed that they should feel support/pressure from MedX " restraints but no pain/discomfort, and encouraged to report any pain to therapist. Patient demonstrated appropriate understanding of information and tolerance of test.  Education regarding purpose of test, safety during test given, and reviewed possible more soreness and strategies.           8/30/2024     8:42 AM   [facility]   Visit Number 18   VAS Pain Rating 0   Treadmill Time (in min.) 5 min   Extension in Lying 10   Lumbar Weight 74 lbs   Repetitions 15   Rating of Perceived Exertion 3.5           NP:    Hamstring stretching supine with cord, 3 sets 10 sec  Side steps with RTB 2 laps 30 ft      Peripheral muscle strengthening which included one set of 15-20 repetitions at a slow and controlled 10-13 second per rep pace focused on strengthening supporting musculature in order to improve body mechanics and functional mobility. Patient and therapist focused on proper form during treatment to ensure optimal strengthening of each targeted muscle group.  Machines utilized included:Torso rotation, Leg Ext, Leg Curl, Chest Press, and RowingTriceps, Biceps, Hip Abd, Hip Add, and Leg Press      Rico participated in dynamic functional therapeutic activities to improve functional performance and simulate household and community activities for 0 minutes. The following activities were included:    Step /c contralateral hip flexion x 10 ea, /c 7.5 KB in hand  Step up to 2 in  /c contralateral hip flexion x 10 ea, /c 7.5 KB in hand  Step up to 4 in  /c contralateral hip flexion x 10 ea, /c 7.5 KB in hand     Rico received manual therapy techniques for -  minutes. The following activities were included:        Pt given cold pack for 5 minutes to low back in z-lie.    Patient Education and Home Exercises   Home exercises include:  EIL,   PPT,   90/90 march,   bridges,   ext in standing,   hamstring stretching    Cardio program (V5):  7/1/24  Lifting education (V11): -  Posture/Lumbar roll: acquired, uses in  home office  Fridge Magnet Discharge handout (date given): -  Equipment at home/gym membership: Yes, [employer] [place] [employer]     Education provided:   - definition of directional preference to promote inc sx self management     Written Home Exercises Provided: Patient instructed to cont prior HEP.  Exercises were reviewed and Rico was able to demonstrate them prior to the end of the session.  Rico demonstrated good  understanding of the education provided.     See EMR under Patient Instructions for exercises provided prior visit    Assessment     Avery presents with no complaints of pain in the low back pain and feeling better overall. Continued previous progressions without issue. Pt demos poor lumbar stability in quad. MedX was performed at 74ft lbs with 15 reps performed at an exertion rating of 3.5/10.       Patient is making good progress towards established goals.  Pt will continue to benefit from skilled outpatient physical therapy to address the deficits stated in the impairment chart, provide pt/family education and to maximize pt's level of independence in the home and community environment.     Anticipated Barriers for therapy: none  Pt's spiritual, cultural and educational needs considered and pt agreeable to plan of care and goals as stated below:     GOALS: Pt is in agreement with the following goals.     Short term goals:  6 weeks or 10 visits   - Pt will demonstrate increased lumbar MedX ROM by at least 6 degrees from the initial ROM value with improvements noted in functional ROM and ability to perform ADLs. MET  - Pt will demonstrate increased MedX average isometric strength value by 10% from initial test resulting in improved ability to perform bending, lifting, and carrying activities safely, confidently. Appropriate and Ongoing  - Pt will report a reduction in worst pain score by 1-2 points for improved tolerance for recreational running.  MET  - Pt able to perform HEP  correctly with minimal cueing or supervision from therapist to encourage independent management of symptoms.  MET     Long term goals: 10 weeks or 20 visits   - Pt will demonstrate increased lumbar MedX ROM by at least 12 degrees from initial ROM value, resulting in improved ability to perform functional forward bending while standing and sitting.  MET  - Pt will demonstrate increased MedX average isometric strength value by 20% from initial test resulting in improved ability to perform bending, lifting, and carrying activities safely and confidently. Appropriate and Ongoing  - Pt to demonstrate ability to independently control and reduce their pain through posture positioning and mechanical movements throughout a typical day. Appropriate and Ongoing  - Pt will demonstrate reduced pain and improved functional outcomes as reported on the FOTO by reaching an intake score of >/= 96% functional ability in order to demonstrate subjective improvement in patient's condition. . Appropriate and Ongoing  - Pt will demonstrate independence with the HEP at discharge. Appropriate and Ongoing  - Pt will achieve core strength that is within functional limits in order to return to running 1 mile/day with no greater than 1/10 low back provocation. MET 07/15/24    Plan     Continue with established Plan of Care towards established PT goals.     Therapist: Shon Gurrola, PTA  8/30/2024

## 2024-09-04 ENCOUNTER — CLINICAL SUPPORT (OUTPATIENT)
Dept: REHABILITATION | Facility: OTHER | Age: 79
End: 2024-09-04
Payer: MEDICARE

## 2024-09-04 DIAGNOSIS — R29.898 DECREASED STRENGTH OF TRUNK AND BACK: ICD-10-CM

## 2024-09-04 DIAGNOSIS — M54.50 CHRONIC MIDLINE LOW BACK PAIN WITHOUT SCIATICA: Primary | ICD-10-CM

## 2024-09-04 DIAGNOSIS — G89.29 CHRONIC MIDLINE LOW BACK PAIN WITHOUT SCIATICA: Primary | ICD-10-CM

## 2024-09-04 PROCEDURE — 97110 THERAPEUTIC EXERCISES: CPT | Mod: CQ

## 2024-09-04 PROCEDURE — 97112 NEUROMUSCULAR REEDUCATION: CPT | Mod: CQ

## 2024-09-04 NOTE — PROGRESS NOTES
"[facility]  Physical Therapy Treatment Note     Name: Rico Park Jr.  Clinic Number: [id number]    Therapy Diagnosis:   Encounter Diagnoses   Name Primary?    Chronic midline low back pain without sciatica Yes    Decreased strength of trunk and back          Physician: Osmar Stokes MD    Visit Date: 9/4/2024    Medical Diagnosis from Referral: M54.50 (ICD-10-CM) - Recurrent low back pain  Evaluation Date: 6/10/2024  Authorization Period Expiration: 5/20/2024  Plan of Care Expiration: 9/26/2024  Reassessment Due: 9/26/2024  Visit # / Visits authorized: 18/20 ([place])    MedX testing visit 2    PTA Visit #: 1/5     Time In: 10:30 am   Time Out: 11:30 am   Total Billable Time: 45 minutes   Total Treatment Time: 45  INSURANCE and OUTCOMES: Fee for Service with FOTO Outcomes 3/3    Precautions: standard    Pattern of pain determined: 1 PEP  Subjective   Rico Park Jr. Reports the he is doing good. No issues.     Patient reports tolerating previous visit well /c no c/o of excess discomfort  Patient reports their pain to be  0 /10 on a 0-10 scale with 0 being no pain and 10 being the worst pain imaginable.  Pain Location: Midline LB     Occupation:   Leisure: Run 1 mile/day, read     Pt goals:  "To get rid of the pain when I run, and get tools to correct my posture".     Objective      Lumbar  Isometric Testing on Med X equipment: Testing administered by PT    Test Initial Baseline Midpoint Final   Date 6/12/2024 07/15/24    ROM 3-39 deg 0 - 51 deg    Max Peak Torque 95  109    Min Peak Torque 33  45    Flex/Ext Ratio 2.9:1 2.4     % variance  normative data 64% below 61% below    % change from initial test N/A visit 1 4        MOVEMENT LOSS - Lumbar    Norms ROM Loss Initial 7/1/24 8/26/2024   Flexion Fingers touch toes, sacral angle >/= 70 deg, uniform spinal curvature, posterior weight shift  major loss Mod loss Mod loss   Extension ASIS surpasses " "toes, spine of scapulae surpasses heels, uniform spinal curve major loss Mod loss Min loss   Side glide Right   major loss Mod loss Min loss   Side glide Left   major loss Mod loss Mos loss   Rotation Right PT observes contralateral shoulder major loss Mod loss Min loss   Rotation Left PT observes contralateral shoulder major loss Mod loss Min loss      Outcomes:  Intake Score: 89%  Visit 6 Score: 89%  Visit 10 Score: 94%  Discharge Score:  Goal Score: 94%   Treatment     Rico received the treatments listed below:        Rico participated in neuromuscular re-education activities to improve balance, coordination, proprioception, motor control and/or posture for 15 minutes. The following activities were included:    Pallof press green sports cord 15x5 ea  Standing hip abd GTB 2x10x3" ea  [place] carry 15# 2x200 ft ea  Quad LE ext 10x5" ea-NP      NP:   Sit to stand 10#KB  x15  PPT 10 max tactile and verbal cue for core activation-NP  TA activation /c Tball in hooklying 10 x 5 sec hold -NP        Rico participated in therapeutic exercises to develop strength, endurance, ROM, flexibility, posture, and core stabilization for 30 minutes including:        TM /c jog 5 min      LTRs 10x5"  EIL x 10 cue for controlled   prone hip extension x15  Prone Swimmer x10  Supine 90-90 marching x 10  Bridge with march x10 with GTB    Medical MedX Treatment as follows:  MedX testing performed day 10: Patient  received neuromuscular education to engage spinal musculature correctly for motor control and engagement of musculature including the MedX exercise component and practice and standard testing. MedX dynamic exercise and baseline isometric test performed with instructions to guide the patient safely through the testing procedure. Patient instructed to perform isometric test correctly and safely while building to an optimal force with a pain-free effort. Patient also instructed that they should feel support/pressure from " MedX restraints but no pain/discomfort, and encouraged to report any pain to therapist. Patient demonstrated appropriate understanding of information and tolerance of test.  Education regarding purpose of test, safety during test given, and reviewed possible more soreness and strategies.                  9/4/2024    10:58 AM   [facility]   Visit Number 19   VAS Pain Rating 0   Treadmill Time (in min.) 5 min   Extension in Lying 10   Lumbar Weight 74 lbs   Repetitions 20   Rating of Perceived Exertion 3          NP:    Hamstring stretching supine with cord, 3 sets 10 sec  Side steps with RTB 2 laps 30 ft      Peripheral muscle strengthening which included one set of 15-20 repetitions at a slow and controlled 10-13 second per rep pace focused on strengthening supporting musculature in order to improve body mechanics and functional mobility. Patient and therapist focused on proper form during treatment to ensure optimal strengthening of each targeted muscle group.  Machines utilized included:Torso rotation, Leg Ext, Leg Curl, Chest Press, and RowingTriceps, Biceps, Hip Abd, Hip Add, and Leg Press      Rico participated in dynamic functional therapeutic activities to improve functional performance and simulate household and community activities for 0 minutes. The following activities were included:    Step /c contralateral hip flexion x 10 ea, /c 7.5 KB in hand  Step up to 2 in  /c contralateral hip flexion x 10 ea, /c 7.5 KB in hand  Step up to 4 in  /c contralateral hip flexion x 10 ea, /c 7.5 KB in hand     Rico received manual therapy techniques for -  minutes. The following activities were included:        Pt given cold pack for 5 minutes to low back in z-lie.    Patient Education and Home Exercises   Home exercises include:  EIL,   PPT,   90/90 march,   bridges,   ext in standing,   hamstring stretching    Cardio program (V5):  7/1/24  Lifting education (V11): -  Posture/Lumbar roll: acquired,  uses in home office  Fridge Magnet Discharge handout (date given): -9/4/24  Equipment at home/gym membership: Yes, [employer] [place] [employer]     Education provided:   - definition of directional preference to promote inc sx self management     Written Home Exercises Provided: Patient instructed to cont prior HEP.  Exercises were reviewed and Rico was able to demonstrate them prior to the end of the session.  Rico demonstrated good  understanding of the education provided.     See EMR under Patient Instructions for exercises provided prior visit    Assessment     Avery presents with no complaints of pain in the low back pain and feeling better overall. Continued previous progressions without issue. Pt demos poor lumbar stability in quad. MedX was performed at 74ft lbs with 20 reps performed at an exertion rating of 3/10.       Patient is making good progress towards established goals.  Pt will continue to benefit from skilled outpatient physical therapy to address the deficits stated in the impairment chart, provide pt/family education and to maximize pt's level of independence in the home and community environment.     Anticipated Barriers for therapy: none  Pt's spiritual, cultural and educational needs considered and pt agreeable to plan of care and goals as stated below:     GOALS: Pt is in agreement with the following goals.     Short term goals:  6 weeks or 10 visits   - Pt will demonstrate increased lumbar MedX ROM by at least 6 degrees from the initial ROM value with improvements noted in functional ROM and ability to perform ADLs. MET  - Pt will demonstrate increased MedX average isometric strength value by 10% from initial test resulting in improved ability to perform bending, lifting, and carrying activities safely, confidently. Appropriate and Ongoing  - Pt will report a reduction in worst pain score by 1-2 points for improved tolerance for recreational running.  MET  - Pt able to  perform HEP correctly with minimal cueing or supervision from therapist to encourage independent management of symptoms.  MET     Long term goals: 10 weeks or 20 visits   - Pt will demonstrate increased lumbar MedX ROM by at least 12 degrees from initial ROM value, resulting in improved ability to perform functional forward bending while standing and sitting.  MET  - Pt will demonstrate increased MedX average isometric strength value by 20% from initial test resulting in improved ability to perform bending, lifting, and carrying activities safely and confidently. Appropriate and Ongoing  - Pt to demonstrate ability to independently control and reduce their pain through posture positioning and mechanical movements throughout a typical day. Appropriate and Ongoing  - Pt will demonstrate reduced pain and improved functional outcomes as reported on the FOTO by reaching an intake score of >/= 96% functional ability in order to demonstrate subjective improvement in patient's condition. . Appropriate and Ongoing  - Pt will demonstrate independence with the HEP at discharge. Appropriate and Ongoing  - Pt will achieve core strength that is within functional limits in order to return to running 1 mile/day with no greater than 1/10 low back provocation. MET 07/15/24    Plan     Continue with established Plan of Care towards established PT goals.     Therapist: Jessi Kearney, PTA  9/4/2024

## 2024-09-09 ENCOUNTER — CLINICAL SUPPORT (OUTPATIENT)
Dept: REHABILITATION | Facility: OTHER | Age: 79
End: 2024-09-09
Payer: MEDICARE

## 2024-09-09 DIAGNOSIS — M54.50 CHRONIC MIDLINE LOW BACK PAIN WITHOUT SCIATICA: Primary | ICD-10-CM

## 2024-09-09 DIAGNOSIS — G89.29 CHRONIC MIDLINE LOW BACK PAIN WITHOUT SCIATICA: Primary | ICD-10-CM

## 2024-09-09 DIAGNOSIS — R29.898 DECREASED STRENGTH OF TRUNK AND BACK: ICD-10-CM

## 2024-09-09 PROCEDURE — 97110 THERAPEUTIC EXERCISES: CPT

## 2024-09-09 PROCEDURE — 97112 NEUROMUSCULAR REEDUCATION: CPT

## 2024-09-09 NOTE — PROGRESS NOTES
"[facility]  Physical Therapy Treatment Note     Name: Rico Park Jr.  Clinic Number: [id number]    Therapy Diagnosis:   Encounter Diagnoses   Name Primary?    Chronic midline low back pain without sciatica Yes    Decreased strength of trunk and back          Physician: Osmar Stokes MD    Visit Date: 9/9/2024    Medical Diagnosis from Referral: M54.50 (ICD-10-CM) - Recurrent low back pain  Evaluation Date: 6/10/2024  Authorization Period Expiration: 5/20/2024  Plan of Care Expiration: 9/26/2024  Reassessment Due: 9/26/2024  Visit # / Visits authorized: 20/20 ([employer])    MedX testing visit 2    PTA Visit #: 0/5     Time In: 8:00 am   Time Out: 8:45 am   Total Billable Time: 30 minutes   Total Treatment Time: 45  INSURANCE and OUTCOMES: Fee for Service with FOTO Outcomes 3/3    Precautions: standard    Pattern of pain determined: 1 PEP  Subjective   Rico Park Jr. Reports the he is doing good, plans on going to the gym at the [facility] [employer] [place] [facility]     Patient reports tolerating previous visit well /c no c/o of excess discomfort  Patient reports their pain to be  0 /10 on a 0-10 scale with 0 being no pain and 10 being the worst pain imaginable.  Pain Location: Midline LB     Occupation:   Leisure: Run 1 mile/day, read     Pt goals:  "To get rid of the pain when I run, and get tools to correct my posture".     Objective      Lumbar  Isometric Testing on Med X equipment: Testing administered by PT    Test Initial Baseline Midpoint Final   Date 6/12/2024 07/15/24 9/9/2024   ROM 3-39 deg 0 - 51 deg 0-54   Max Peak Torque 95  109 140   Min Peak Torque 33  45 47   Flex/Ext Ratio 2.9:1 2.4  2.9:1   % variance  normative data 64% below 61% below 54:   % change from initial test N/A visit 1 4 +30       MOVEMENT LOSS - Lumbar    Norms ROM Loss Initial 7/1/24 8/26/2024   Flexion Fingers touch toes, sacral angle >/= 70 deg, uniform spinal " "curvature, posterior weight shift  major loss Mod loss Mod loss   Extension ASIS surpasses toes, spine of scapulae surpasses heels, uniform spinal curve major loss Mod loss Min loss   Side glide Right   major loss Mod loss Min loss   Side glide Left   major loss Mod loss Mos loss   Rotation Right PT observes contralateral shoulder major loss Mod loss Min loss   Rotation Left PT observes contralateral shoulder major loss Mod loss Min loss      Outcomes:  Intake Score: 89%  Visit 6 Score: 89%  Visit 10 Score: 94%  Discharge Score: 94  Goal Score: 94%   Treatment     Rico received the treatments listed below:        Rico participated in neuromuscular re-education activities to improve balance, coordination, proprioception, motor control and/or posture for 15 minutes. The following activities were included:    [place] press green sports cord 10x5 ea  Standing hip abd GTB x10x3" ea  Sit to stand 10#KB  x10  Side steps with RTB 2 laps 15 ft    NP:   [place] carry 15# 2x200 ft ea  Quad LE ext 10x5" ea-NP  PPT 10 max tactile and verbal cue for core activation-NP  TA activation /c Tball in hooklying 10 x 5 sec hold -NP        Rico participated in therapeutic exercises to develop strength, endurance, ROM, flexibility, posture, and core stabilization for 30 minutes including:        TM /c jog 5 min      LTRs 10x5"  EIL x 10 cue for controlled   prone hip extension x15  Prone Swimmer x10  Supine 90-90 marching x 10  Bridge with march x10 with GTB    Medical MedX Treatment as follows:  MedX testing performed day 10: Patient  received neuromuscular education to engage spinal musculature correctly for motor control and engagement of musculature including the MedX exercise component and practice and standard testing. MedX dynamic exercise and baseline isometric test performed with instructions to guide the patient safely through the testing procedure. Patient instructed to perform isometric test correctly and safely " while building to an optimal force with a pain-free effort. Patient also instructed that they should feel support/pressure from MedX restraints but no pain/discomfort, and encouraged to report any pain to therapist. Patient demonstrated appropriate understanding of information and tolerance of test.  Education regarding purpose of test, safety during test given, and reviewed possible more soreness and strategies.                  9/4/2024    10:58 AM   [facility]   Visit Number 19   VAS Pain Rating 0   Treadmill Time (in min.) 5 min   Extension in Lying 10   Lumbar Weight 74 lbs   Repetitions 20   Rating of Perceived Exertion 3          NP:    Hamstring stretching supine with cord, 3 sets 10 sec  Side steps with RTB 2 laps 30 ft      Peripheral muscle strengthening which included one set of 15-20 repetitions at a slow and controlled 10-13 second per rep pace focused on strengthening supporting musculature in order to improve body mechanics and functional mobility. Patient and therapist focused on proper form during treatment to ensure optimal strengthening of each targeted muscle group.  Machines utilized included:Torso rotation, Leg Ext, Leg Curl, Chest Press, and RowingTriceps, Biceps, Hip Abd, Hip Add, and Leg Press      Rico participated in dynamic functional therapeutic activities to improve functional performance and simulate household and community activities for 0 minutes. The following activities were included:    Step /c contralateral hip flexion x 10 ea, /c 7.5 KB in hand  Step up to 2 in  /c contralateral hip flexion x 10 ea, /c 7.5 KB in hand  Step up to 4 in  /c contralateral hip flexion x 10 ea, /c 7.5 KB in hand     Rico received manual therapy techniques for -  minutes. The following activities were included:        Pt given cold pack for 5 minutes to low back in z-lie.    Patient Education and Home Exercises   Home exercises include:  EIL,   PPT,   90/90 march,   bridges,   ext in  standing,   hamstring stretching    Cardio program (V5):  7/1/24  Lifting education (V11): -  Posture/Lumbar roll: acquired, uses in home office  Fridge Magnet Discharge handout (date given): -9/4/24  Equipment at home/gym membership: Yes, [facility]     Education provided:   - definition of directional preference to promote inc sx self management     Written Home Exercises Provided: Patient instructed to cont prior HEP.  Exercises were reviewed and Rico was able to demonstrate them prior to the end of the session.  Rico demonstrated good  understanding of the education provided.     See EMR under Patient Instructions for exercises provided prior visit    Assessment     Patient has attended 20 visits of the Healthy Back program focusing aerobic training, isometric testing with dynamic strengthening on MedX machine for spine, whole body strengthening on peripheral strengthening equipment, HEP, and patient education. Patient has completed the Healthy Back Program and is ready to be transitioned into wellness program. Educated on the importance of wellness program and attending weekly in order to maintain strength. Stressed the importance of continuing exercise and maintaining proper body mechanics and ergonomics in order to fully participate in activities of daily living, work, and leisure activities. At this time, patient demonstrates improvement in ability to reduce symptoms, improved posture, improved spinal ROM and improved strength. Discharge handout with HEP given and reviewed all information given. Patient able to demonstrate and verbalize understanding. Patient does not plan to attend wellness and is appropriate for transition.     -Improved posture and is using lumbar roll.  -Improved lumbar ROM, initially on MedX test 3-39 and currently 0-54.  -Improved strength at each test point on lumbar med ex IM test with 30% average improvement with reduced pain noted by  patient.  -Initial outcome tool score 89 and current outcome tool score 94 indicating reduced pain and improved function.    Patient is making good progress towards established goals.  Pt will continue to benefit from skilled outpatient physical therapy to address the deficits stated in the impairment chart, provide pt/family education and to maximize pt's level of independence in the home and community environment.     Anticipated Barriers for therapy: none  Pt's spiritual, cultural and educational needs considered and pt agreeable to plan of care and goals as stated below:     GOALS: Pt is in agreement with the following goals.     Short term goals:  6 weeks or 10 visits   - Pt will demonstrate increased lumbar MedX ROM by at least 6 degrees from the initial ROM value with improvements noted in functional ROM and ability to perform ADLs. MET  - Pt will demonstrate increased MedX average isometric strength value by 10% from initial test resulting in improved ability to perform bending, lifting, and carrying activities safely, confidently. Met 9/9/2024  - Pt will report a reduction in worst pain score by 1-2 points for improved tolerance for recreational running.  MET  - Pt able to perform HEP correctly with minimal cueing or supervision from therapist to encourage independent management of symptoms.  MET     Long term goals: 10 weeks or 20 visits   - Pt will demonstrate increased lumbar MedX ROM by at least 12 degrees from initial ROM value, resulting in improved ability to perform functional forward bending while standing and sitting.  MET  - Pt will demonstrate increased MedX average isometric strength value by 20% from initial test resulting in improved ability to perform bending, lifting, and carrying activities safely and confidently. Met 9/9/2024  - Pt to demonstrate ability to independently control and reduce their pain through posture positioning and mechanical movements throughout a typical day. Met  9/9/2024  - Pt will demonstrate reduced pain and improved functional outcomes as reported on the FOTO by reaching an intake score of >/= 96% functional ability in order to demonstrate subjective improvement in patient's condition. Partially Met  - Pt will demonstrate independence with the HEP at discharge. Met 9/9/2024  - Pt will achieve core strength that is within functional limits in order to return to running 1 mile/day with no greater than 1/10 low back provocation. Met 9/9/2024    Plan     D/C to HEP and personal gym    Therapist: Minnie Price, PT  9/9/2024

## 2024-11-27 ENCOUNTER — OFFICE VISIT (OUTPATIENT)
Dept: OPTOMETRY | Facility: CLINIC | Age: 79
End: 2024-11-27
Payer: COMMERCIAL

## 2024-11-27 ENCOUNTER — TELEPHONE (OUTPATIENT)
Dept: OPTOMETRY | Facility: CLINIC | Age: 79
End: 2024-11-27
Payer: MEDICARE

## 2024-11-27 DIAGNOSIS — Z96.1 PSEUDOPHAKIA OF BOTH EYES: ICD-10-CM

## 2024-11-27 DIAGNOSIS — H43.813 PVD (POSTERIOR VITREOUS DETACHMENT), BILATERAL: ICD-10-CM

## 2024-11-27 DIAGNOSIS — H52.4 PRESBYOPIA: Primary | ICD-10-CM

## 2024-11-27 DIAGNOSIS — H35.373 EPIRETINAL MEMBRANE (ERM) OF BOTH EYES: ICD-10-CM

## 2024-11-27 DIAGNOSIS — H02.831 DERMATOCHALASIS OF BOTH UPPER EYELIDS: ICD-10-CM

## 2024-11-27 DIAGNOSIS — H02.834 DERMATOCHALASIS OF BOTH UPPER EYELIDS: ICD-10-CM

## 2024-11-27 DIAGNOSIS — H26.493 PCO (POSTERIOR CAPSULAR OPACIFICATION), BILATERAL: ICD-10-CM

## 2024-11-27 PROCEDURE — 99999 PR PBB SHADOW E&M-EST. PATIENT-LVL II: CPT | Mod: PBBFAC,,, | Performed by: OPTOMETRIST

## 2024-11-27 NOTE — TELEPHONE ENCOUNTER
Left message to schedule appt for YAG Laser with Dr. Mills.     ----- Message from Juliane Osborn OD sent at 11/27/2024  9:59 AM CST -----  Please call to schedule YAG OU

## 2024-11-27 NOTE — PROGRESS NOTES
ROB    PENELOPE: 9/27/2016  Chief complaint (CC): patient here for routine check  No complaints or concerns at this time.   Glasses? + readers  Contacts? -  H/o eye surgery, injections or laser: PCIOL SX OU  Eye gtts? -      (-) Flashes (-)  Floaters (-) Mucous   (-)  Tearing (-) Itching (-) Burning   (-) Headaches (-) Eye Pain/discomfort (-) Irritation   (-)  Redness (-) Double vision (-) Blurry vision    Diabetic? -  A1c? Hemoglobin A1C       Date                     Value               Ref Range             Status                08/28/2024               5.0                 4.0 - 5.6 %           Final                   Last edited by Charity Cooper on 11/27/2024  9:28 AM.            Assessment /Plan     For exam results, see Encounter Report.    Presbyopia   Ok to continue with OTC readers    Epiretinal membrane (ERM) of both eyes  -  Today   Posterior Segment OCT Retina-OS>OD    Dermatochalasis of both upper eyelids  OS>>OD   Monitor    PVD (posterior vitreous detachment), bilateral   Stable, monitor    Pseudophakia of both eyes   Lina Aug  2015  PCO (posterior capsular opacification), bilateral   Consult for YAG     RTC1  year, OCT mac

## 2025-01-07 ENCOUNTER — IMMUNIZATION (OUTPATIENT)
Dept: INTERNAL MEDICINE | Facility: CLINIC | Age: 80
End: 2025-01-07
Payer: MEDICARE

## 2025-01-07 DIAGNOSIS — Z23 NEED FOR VACCINATION: Primary | ICD-10-CM

## 2025-01-07 PROCEDURE — 91320 SARSCV2 VAC 30MCG TRS-SUC IM: CPT | Mod: S$GLB,,, | Performed by: INTERNAL MEDICINE

## 2025-01-07 PROCEDURE — 90480 ADMN SARSCOV2 VAC 1/ONLY CMP: CPT | Mod: S$GLB,,, | Performed by: INTERNAL MEDICINE

## 2025-01-28 ENCOUNTER — TELEPHONE (OUTPATIENT)
Dept: OPHTHALMOLOGY | Facility: CLINIC | Age: 80
End: 2025-01-28
Payer: MEDICARE

## 2025-01-30 DIAGNOSIS — Z00.00 ENCOUNTER FOR MEDICARE ANNUAL WELLNESS EXAM: ICD-10-CM

## 2025-02-07 ENCOUNTER — PATIENT MESSAGE (OUTPATIENT)
Dept: INTERNAL MEDICINE | Facility: CLINIC | Age: 80
End: 2025-02-07
Payer: MEDICARE

## 2025-02-07 ENCOUNTER — OFFICE VISIT (OUTPATIENT)
Dept: INTERNAL MEDICINE | Facility: CLINIC | Age: 80
End: 2025-02-07
Payer: MEDICARE

## 2025-02-07 ENCOUNTER — TELEPHONE (OUTPATIENT)
Dept: INTERNAL MEDICINE | Facility: CLINIC | Age: 80
End: 2025-02-07
Payer: MEDICARE

## 2025-02-07 VITALS — HEIGHT: 66 IN | BODY MASS INDEX: 21.21 KG/M2

## 2025-02-07 DIAGNOSIS — R42 DIZZINESS: Primary | ICD-10-CM

## 2025-02-07 NOTE — TELEPHONE ENCOUNTER
Notified pt we could move up his virtual appt from 4pm to 1pm due to open availability.    Appointment Information   Name: BRADENJOANN JR.     Date: 2/7/2025     Appt Time: 1:00 PM     Visit Type: ESTABLISHED PATIENT - VIRTUAL [[id number]]     Provider: Mayur Traore PA-C Dept: [facility], Internal Medicine       Dept Address: [address] [place] [id number]       Dept Phone Number: [phone]     Pt verbalized understanding

## 2025-02-07 NOTE — TELEPHONE ENCOUNTER
Dizziness 1 month ago and then today    Episodes last about 10 minutes  He has to sit down, then it passes.     No other symptoms. Denies SOB or chest pain    Scheduled for today to see our PA

## 2025-02-07 NOTE — PROGRESS NOTES
The patient location is:  Patient Home   The chief complaint leading to consultation is:  Dizziness (Pt c/o feeling dizzy when standing. Pt states he has had 3 episodes in the last six weeks )    Subjective:         Dizziness: no hearing loss, no headaches, no vomiting, no weakness, no palpitations and no chest pain.     Rico Park Jr. is a 79 y.o.  male with history of BPH, ED, anemia, chronic low back pain who presents for Dizziness (Pt c/o feeling dizzy when standing. Pt states he has had 3 episodes in the last six weeks )  .   History of Present Illness    CHIEF COMPLAINT:  Patient presents today for episodes of near-fainting    SYNCOPE:  He reports three episodes of near-fainting in the past month. The last episode was about 3 weeks ago. Episodes are not position dependent.  They last no longer than 5 minutes.  2 of the episodes occurred while he was walking across the street, 1 of them occurred while he was standing eating oysters. He denies associated symptoms including sweating, warmth, nausea, vomiting, pallor as observed by others, palpitations, recent illness, ear/sinus pressure/pain, or vision changes. He denies neurological symptoms including weakness, numbness, slurred speech, or limb drooping. He has not had any significant activity changes lately.     He does not measure his blood pressure regularly and did not during these episodes.    MEDICATIONS:  He takes amlodipine 5 mg daily and Losartan 100 mg daily for blood pressure management. He takes 1 gabapentin daily.     SOCIAL HISTORY:  He consumes 1-2 glasses of wine every other day.    ROS:  General: -fever, -chills, -fatigue, -weight gain, -weight loss  Eyes: -vision changes, -redness, -discharge  ENT: -ear pain, -nasal congestion, -sore throat  Cardiovascular: -chest pain, -palpitations, -lower extremity edema  Respiratory: -cough, -shortness of breath  Gastrointestinal: -abdominal pain, -nausea, -vomiting, -diarrhea, -constipation,  -blood in stool  Genitourinary: -dysuria, -hematuria, -frequency  Musculoskeletal: -joint pain, -muscle pain  Skin: -rash, -lesion  Neurological: -headache, -dizziness, -numbness, -tingling, -weakness  Psychiatric: -anxiety, -depression, -sleep difficulty          Review of Systems   Constitutional:  Negative for activity change and unexpected weight change.   HENT:  Negative for hearing loss, rhinorrhea and trouble swallowing.    Eyes:  Negative for discharge and visual disturbance.   Respiratory:  Negative for chest tightness and wheezing.    Cardiovascular:  Negative for chest pain and palpitations.   Gastrointestinal:  Negative for blood in stool, constipation, diarrhea and vomiting.   Endocrine: Negative for polydipsia and polyuria.   Genitourinary:  Negative for difficulty urinating, hematuria and urgency.   Musculoskeletal:  Negative for arthralgias, joint swelling and neck pain.   Neurological:  Positive for dizziness. Negative for weakness and headaches.   Psychiatric/Behavioral:  Negative for confusion and dysphoric mood.        No problems updated.    Past Medical History:   Diagnosis Date    Cataract        Past Surgical History:   Procedure Laterality Date    CATARACT EXTRACTION      COLONOSCOPY N/A 02/23/2022    Procedure: COLONOSCOPY;  Surgeon: Luís Langley MD;  Location: [address];  Service: Endoscopy;  Laterality: N/A;  direct referral from Dr. Stokes - Pt due for colonoscopy - screening - see referral tab dated 11/3/21 / Pt requesting to have procedure at [facility]  1/27 covid test 3/20 @ [place]; instructions to portal-    EYE SURGERY      hydrocele removal      TONSILLECTOMY, ADENOIDECTOMY         Family History   Problem Relation Name Age of Onset    Heart disease Father      Diabetes Father      Diabetes Mother         Social History     Tobacco Use    Smoking status: Never    Smokeless tobacco: Never   Substance Use Topics    Alcohol use: Yes     Comment:  "wine every other night (10 drinks per week of beer, wine, and liquor)    Drug use: Never       Objective:   Height 5' 6" (1.676 m).     Physical Exam  Constitutional:       Appearance: Normal appearance.   HENT:      Nose: Nose normal.   Eyes:      Extraocular Movements: Extraocular movements intact.      Conjunctiva/sclera: Conjunctivae normal.   Pulmonary:      Effort: Pulmonary effort is normal.   Neurological:      Mental Status: He is alert. Mental status is at baseline.   Psychiatric:         Mood and Affect: Mood normal.         Behavior: Behavior normal.         Thought Content: Thought content normal.         Judgment: Judgment normal.           Prior labs reviewed  Assessment/Plan:        Rico was seen today for dizziness.    Diagnoses and all orders for this visit:    IMPRESSION:  - Considering multiple etiologies for patient's episodes of feeling faint: cardiac, medication-related, neurological, or vestibular  - low suspicion for TIA or stroke.  - Considering basal vagal response as potential cause, particularly if episodes occur while walking or standing  - Evaluating possibility of over-treated hypertension leading to hypotension  - Assessing for potential atrial fibrillation given patient's age and symptoms  - Considering valve calcification as possible contributor to symptoms    Dizziness  - Evaluated the patient's reported episodes of feeling faint without actual fainting.  - these episodes do not have typical characteristics for vasovagal syncope, TIA, vestibular disturbance/vertigo.  It is possibly related to medication related side effects or dehydration.  - Explained potential causes of fainting sensation, including basal vagal response and its mechanism, particularly if episodes occur primarily when walking or standing.  - Discussed the importance of fluid intake in preventing episodes.  - Recommend increasing fluid intake to 6-8 glasses per day, preferably closer to 8.  - Instructed the " patient to exercise caution in daily activities to prevent falls.  - Patient will complete Holter monitor and echocardiogram and get blood work done to rule out electrolyte abnormalities or other occult bleeding  - recommend they measured her blood pressure and report these findings in a couple of weeks.  -     CBC Auto Differential; Future  -     COMPREHENSIVE METABOLIC PANEL; Future  -     Holter monitor - 48 hour; Future  -     Echo; Future  -     EKG 12-lead; Future    FOLLOW UP:  - Contact the office if symptoms worsen or new concerns arise.          Visit type: Virtual visit with synchronous audio and video    Total time spent with patient: 12 minutes    Each patient to whom he or she provides medical services by telemedicine is:  (1) informed of the relationship between the physician and patient and the respective role of any other health care provider with respect to management of the patient; and (2) notified that he or she may decline to receive medical services by telemedicine and may withdraw from such care at any time.  Medication List with Changes/Refills   Current Medications    ALLOPURINOL (ZYLOPRIM) 300 MG TABLET    Take 1 tablet (300 mg total) by mouth once daily.    AMLODIPINE (NORVASC) 5 MG TABLET    Take 1 tablet (5 mg total) by mouth once daily.    ATORVASTATIN (LIPITOR) 40 MG TABLET    Take 1 tablet (40 mg total) by mouth once daily.    GABAPENTIN (NEURONTIN) 300 MG CAPSULE    Take 1 capsule (300 mg total) by mouth 2 (two) times daily.    LOSARTAN (COZAAR) 100 MG TABLET    Take 1 tablet (100 mg total) by mouth once daily.

## 2025-02-10 ENCOUNTER — HOSPITAL ENCOUNTER (OUTPATIENT)
Dept: CARDIOLOGY | Facility: HOSPITAL | Age: 80
Discharge: HOME | End: 2025-02-10
Attending: COUNSELOR
Payer: MEDICARE

## 2025-02-10 DIAGNOSIS — R42 DIZZINESS: ICD-10-CM

## 2025-02-10 PROCEDURE — 93306 TTE W/DOPPLER COMPLETE: CPT

## 2025-02-10 PROCEDURE — 93010 ELECTROCARDIOGRAM REPORT: CPT | Mod: ,,, | Performed by: INTERNAL MEDICINE

## 2025-02-10 PROCEDURE — 93005 ELECTROCARDIOGRAM TRACING: CPT

## 2025-02-10 PROCEDURE — 93306 TTE W/DOPPLER COMPLETE: CPT | Mod: 26,,, | Performed by: INTERNAL MEDICINE

## 2025-02-11 ENCOUNTER — TELEPHONE (OUTPATIENT)
Dept: INTERNAL MEDICINE | Facility: CLINIC | Age: 80
End: 2025-02-11
Payer: MEDICARE

## 2025-02-11 LAB
ASCENDING AORTA: 3.44 CM
AV INDEX (PROSTH): 1.02
AV MEAN GRADIENT: 3 MMHG
AV PEAK GRADIENT: 5 MMHG
AV VALVE AREA BY VELOCITY RATIO: 3.1 CM²
AV VALVE AREA: 3.5 CM²
AV VELOCITY RATIO: 0.91
CV ECHO LV RWT: 0.47 CM
DOP CALC AO PEAK VEL: 1.1 M/S
DOP CALC AO VTI: 25.7 CM
DOP CALC LVOT AREA: 3.5 CM2
DOP CALC LVOT DIAMETER: 2.1 CM
DOP CALC LVOT PEAK VEL: 1 M/S
DOP CALC LVOT STROKE VOLUME: 90.7 CM3
DOP CALC MV VTI: 27 CM
DOP CALCLVOT PEAK VEL VTI: 26.2 CM
E WAVE DECELERATION TIME: 243 MSEC
E/A RATIO: 0.9
E/E' RATIO: 9 M/S
ECHO LV POSTERIOR WALL: 1 CM (ref 0.6–1.1)
FRACTIONAL SHORTENING: 34.9 % (ref 28–44)
INTERVENTRICULAR SEPTUM: 1 CM (ref 0.6–1.1)
IVC DIAMETER: 1.35 CM
IVRT: 97 MSEC
LA MAJOR: 5.1 CM
LA MINOR: 4.9 CM
LA WIDTH: 3.8 CM
LEFT ATRIUM SIZE: 3.8 CM
LEFT ATRIUM VOLUME: 61 CM3
LEFT INTERNAL DIMENSION IN SYSTOLE: 2.8 CM (ref 2.1–4)
LEFT VENTRICLE DIASTOLIC VOLUME: 84.65 ML
LEFT VENTRICLE SYSTOLIC VOLUME: 29.1 ML
LEFT VENTRICULAR INTERNAL DIMENSION IN DIASTOLE: 4.3 CM (ref 3.5–6)
LEFT VENTRICULAR MASS: 142.5 G
LV LATERAL E/E' RATIO: 9.4 M/S
LV SEPTAL E/E' RATIO: 9.4 M/S
LVED V (TEICH): 84.65 ML
LVES V (TEICH): 29.1 ML
LVOT MG: 2.01 MMHG
LVOT MV: 0.65 CM/S
MV MEAN GRADIENT: 1 MMHG
MV PEAK A VEL: 0.83 M/S
MV PEAK E VEL: 0.75 M/S
MV PEAK GRADIENT: 4 MMHG
MV STENOSIS PRESSURE HALF TIME: 70.55 MS
MV VALVE AREA BY CONTINUITY EQUATION: 3.36 CM2
MV VALVE AREA P 1/2 METHOD: 3.12 CM2
OHS CV RV/LV RATIO: 0.84 CM
PISA TR MAX VEL: 2.8 M/S
PV PEAK GRADIENT: 4 MMHG
PV PEAK VELOCITY: 0.98 M/S
RA MAJOR: 4.88 CM
RA PRESSURE ESTIMATED: 3 MMHG
RA WIDTH: 2.9 CM
RIGHT VENTRICLE DIASTOLIC BASEL DIMENSION: 3.6 CM
RIGHT VENTRICULAR END-DIASTOLIC DIMENSION: 3.61 CM
RV TB RVSP: 6 MMHG
RV TISSUE DOPPLER FREE WALL SYSTOLIC VELOCITY 1 (APICAL 4 CHAMBER VIEW): 15.91 CM/S
SINUS: 3.49 CM
STJ: 2.51 CM
TDI LATERAL: 0.08 M/S
TDI SEPTAL: 0.08 M/S
TDI: 0.08 M/S
TR MAX PG: 31 MMHG
TRICUSPID ANNULAR PLANE SYSTOLIC EXCURSION: 2.28 CM
TV REST PULMONARY ARTERY PRESSURE: 34 MMHG

## 2025-02-11 NOTE — TELEPHONE ENCOUNTER
[facility] about [facility] that hasn't been uploaded into the chart because maybe it hasn't been done or forgotten to be uploaded.     Spoke to [facility] staff member about the EKG and cardiology staff Jackelyn said it has been done and asked would I like a faxed version of it I stated  yes and she sent it over.    Notified provider I'll put the faxed copy of the results in the scan bin to be scanned into the pt's chart.

## 2025-02-12 ENCOUNTER — TELEPHONE (OUTPATIENT)
Dept: INTERNAL MEDICINE | Facility: CLINIC | Age: 80
End: 2025-02-12
Payer: MEDICARE

## 2025-02-12 LAB
OHS QRS DURATION: 80 MS
OHS QTC CALCULATION: 416 MS

## 2025-02-12 NOTE — TELEPHONE ENCOUNTER
Left voicemail for Holter monitor department to get back in touch with internal medicine staff.     So I can get this pt scheduled.

## 2025-02-13 ENCOUNTER — TELEPHONE (OUTPATIENT)
Dept: INTERNAL MEDICINE | Facility: CLINIC | Age: 80
End: 2025-02-13
Payer: MEDICARE

## 2025-02-13 NOTE — TELEPHONE ENCOUNTER
Spoke to patient about echo and ECG, noting some changes on ECG with possible LVH and sinus bradycardia, although not a significant decreased heart rate. Echo showed elevated pulmonary artery pressure indicating possible mild pulmonary hypertension, but no left heart failure. There was grade 1 diastolic dysfunction. Explained that although there are these changes, it does not likely explain his 3 episodes of non exertional dizziness described as near syncope without associated symptoms.     Patient has had no more episodes of dizzy spells or near syncope. They exercise 4-5 times a week with running having no issues. Discussed that Holter monitor would still be useful, but they do not indicate a central brain lesion based on symptoms. Discussed that an MRI could be done for that, but they do not feel it is necessary at this time and will let us know if they have any recurrences.     Patient expressed understanding and was agreeable to the plan.

## 2025-02-14 ENCOUNTER — OFFICE VISIT (OUTPATIENT)
Dept: OPHTHALMOLOGY | Facility: CLINIC | Age: 80
End: 2025-02-14
Payer: MEDICARE

## 2025-02-14 DIAGNOSIS — H26.493 POSTERIOR CAPSULAR OPACIFICATION, BILATERAL: Primary | ICD-10-CM

## 2025-02-14 PROCEDURE — 99999 PR PBB SHADOW E&M-EST. PATIENT-LVL III: CPT | Mod: PBBFAC,,, | Performed by: OPHTHALMOLOGY

## 2025-02-14 NOTE — PROGRESS NOTES
HPI    Referred by Dr Osborn    S/p phaco w/IOL OU  No eyedrops    Pt here for YAG Cap OU eval .  Pt states he has some difficulty driving at   night OU.   Last edited by Berkley Wells MA on 2/14/2025  1:07 PM.            Assessment /Plan     For exam results, see Encounter Report.    Posterior capsular opacification, bilateral      Visually significant posterior capsular opacity present.  Discussed risks, benefits, and alternatives to laser surgery.  YAG laser capsulotomy Procedure Note:   Informed consent obtained and correct eye(s) verified with patient.  1 drop of topical Proparacaine and Iopidine instilled, and eye(s) dilated with 1% Tropicamide 2.5% Phenylephrine.  YAG laser applied to posterior capsule in cruciate pattern OU  Patient tolerated procedure well. No complications. Follow up in 1 month/PRN.

## 2025-02-19 ENCOUNTER — PATIENT MESSAGE (OUTPATIENT)
Dept: INTERNAL MEDICINE | Facility: CLINIC | Age: 80
End: 2025-02-19
Payer: MEDICARE

## 2025-02-26 ENCOUNTER — HOSPITAL ENCOUNTER (OUTPATIENT)
Dept: CARDIOLOGY | Facility: OTHER | Age: 80
Discharge: HOME | End: 2025-02-26
Attending: COUNSELOR
Payer: MEDICARE

## 2025-02-26 DIAGNOSIS — R42 DIZZINESS: ICD-10-CM

## 2025-02-26 PROCEDURE — 93225 XTRNL ECG REC<48 HRS REC: CPT

## 2025-03-07 ENCOUNTER — TELEPHONE (OUTPATIENT)
Dept: ADMINISTRATIVE | Facility: OTHER | Age: 80
End: 2025-03-07
Payer: MEDICARE

## 2025-03-07 ENCOUNTER — RESULTS FOLLOW-UP (OUTPATIENT)
Dept: INTERNAL MEDICINE | Facility: CLINIC | Age: 80
End: 2025-03-07
Payer: MEDICARE

## 2025-03-07 DIAGNOSIS — R42 DIZZINESS: Primary | ICD-10-CM

## 2025-03-07 NOTE — TELEPHONE ENCOUNTER
"Pt would like your recommendation on the best cardiologist.       "They have already called to schedule . Dr. Singh is a specialist, so is not available. I was given 2 names at [facility] but would like Dr. Watkins recommendation. Thanks"        "

## 2025-03-07 NOTE — TELEPHONE ENCOUNTER
Spoke to patient who request a call back next week as he would like to speak to Dr. SERGIO Stokes to seek his opinion on who he would recommend that he see for Cardiology.

## 2025-03-11 ENCOUNTER — PATIENT MESSAGE (OUTPATIENT)
Dept: INTERNAL MEDICINE | Facility: CLINIC | Age: 80
End: 2025-03-11
Payer: MEDICARE

## 2025-03-26 DIAGNOSIS — I10 BENIGN ESSENTIAL HTN: ICD-10-CM

## 2025-03-26 RX ORDER — AMLODIPINE BESYLATE 5 MG/1
5 TABLET ORAL
Qty: 90 TABLET | Refills: 1 | Status: SHIPPED | OUTPATIENT
Start: 2025-03-26

## 2025-03-26 RX ORDER — LOSARTAN POTASSIUM 100 MG/1
TABLET ORAL
Qty: 90 TABLET | Refills: 1 | Status: SHIPPED | OUTPATIENT
Start: 2025-03-26

## 2025-03-26 NOTE — TELEPHONE ENCOUNTER
Care Due:                  Date            Visit Type   Department     Provider  --------------------------------------------------------------------------------                                MYCHART                              ANNUAL                              CHECKUP/PHY  [facility] INTERNAL  Last Visit: 08-      S            JOSIAH Stokes  Next Visit: None Scheduled  None         None Found                                                            Last  Test          Frequency    Reason                     Performed    Due Date  --------------------------------------------------------------------------------    Uric Acid...  12 months..  allopurinoL..............  Not Found    Overdue    [employer] Embedded Care Due Messages. Reference number: [id number].   3/26/2025 5:49:34 AM CDT

## 2025-03-26 NOTE — TELEPHONE ENCOUNTER
Provider Staff:  Action required for this patient     Please see care gap opportunities below in Care Due Message.    Thanks!  [facility]     Appointments      Date Provider   Last Visit   8/28/2024 Osmar Stokes MD   Next Visit   Visit date not found Osmar Stokes MD      Refill Decision Note   Rico Park  is requesting a refill authorization.  Brief Assessment and Rationale for Refill:  Approve     Medication Therapy Plan:         Comments:     Note composed:12:34 PM 03/26/2025

## 2025-03-27 DIAGNOSIS — E78.5 HYPERLIPIDEMIA, UNSPECIFIED HYPERLIPIDEMIA TYPE: ICD-10-CM

## 2025-03-27 RX ORDER — ATORVASTATIN CALCIUM 40 MG/1
40 TABLET, FILM COATED ORAL DAILY
Qty: 90 TABLET | Refills: 1 | Status: SHIPPED | OUTPATIENT
Start: 2025-03-27

## 2025-03-27 NOTE — TELEPHONE ENCOUNTER
No care due was identified.  [facility] Embedded Care Due Messages. Reference number: [id number].   3/27/2025 10:34:16 AM CDT

## 2025-03-27 NOTE — TELEPHONE ENCOUNTER
Refill Decision Note   Rico Park  is requesting a refill authorization.  Brief Assessment and Rationale for Refill:  Approve     Medication Therapy Plan:         Comments:     Note composed:2:04 PM 03/27/2025

## 2025-04-01 DIAGNOSIS — M10.9 ACUTE GOUT OF FOOT, UNSPECIFIED CAUSE, UNSPECIFIED LATERALITY: ICD-10-CM

## 2025-04-01 NOTE — TELEPHONE ENCOUNTER
No care due was identified.  [facility] Embedded Care Due Messages. Reference number: [id number].   4/01/2025 10:45:28 AM CDT

## 2025-04-02 RX ORDER — GABAPENTIN 300 MG/1
300 CAPSULE ORAL 2 TIMES DAILY
Qty: 180 CAPSULE | Refills: 0 | Status: SHIPPED | OUTPATIENT
Start: 2025-04-02

## 2025-04-02 RX ORDER — ALLOPURINOL 300 MG/1
300 TABLET ORAL DAILY
Qty: 90 TABLET | Refills: 2 | Status: SHIPPED | OUTPATIENT
Start: 2025-04-02

## 2025-04-09 ENCOUNTER — OFFICE VISIT (OUTPATIENT)
Dept: CARDIOLOGY | Facility: CLINIC | Age: 80
End: 2025-04-09
Attending: INTERNAL MEDICINE
Payer: MEDICARE

## 2025-04-09 VITALS
DIASTOLIC BLOOD PRESSURE: 70 MMHG | HEIGHT: 66 IN | HEART RATE: 60 BPM | WEIGHT: 132.94 LBS | OXYGEN SATURATION: 96 % | BODY MASS INDEX: 21.36 KG/M2 | SYSTOLIC BLOOD PRESSURE: 155 MMHG

## 2025-04-09 DIAGNOSIS — D64.9 ANEMIA, UNSPECIFIED TYPE: ICD-10-CM

## 2025-04-09 DIAGNOSIS — R42 DIZZINESS: ICD-10-CM

## 2025-04-09 DIAGNOSIS — I10 PRIMARY HYPERTENSION: ICD-10-CM

## 2025-04-09 DIAGNOSIS — R55 SYNCOPE, UNSPECIFIED SYNCOPE TYPE: ICD-10-CM

## 2025-04-09 PROCEDURE — 1159F MED LIST DOCD IN RCRD: CPT | Mod: CPTII,S$GLB,, | Performed by: INTERNAL MEDICINE

## 2025-04-09 PROCEDURE — 1101F PT FALLS ASSESS-DOCD LE1/YR: CPT | Mod: CPTII,S$GLB,, | Performed by: INTERNAL MEDICINE

## 2025-04-09 PROCEDURE — 93000 ELECTROCARDIOGRAM COMPLETE: CPT | Mod: S$GLB,,, | Performed by: INTERNAL MEDICINE

## 2025-04-09 PROCEDURE — 3077F SYST BP >= 140 MM HG: CPT | Mod: CPTII,S$GLB,, | Performed by: INTERNAL MEDICINE

## 2025-04-09 PROCEDURE — 93005 ELECTROCARDIOGRAM TRACING: CPT

## 2025-04-09 PROCEDURE — 99205 OFFICE O/P NEW HI 60 MIN: CPT | Mod: 25,S$GLB,, | Performed by: INTERNAL MEDICINE

## 2025-04-09 PROCEDURE — 99999 PR PBB SHADOW E&M-EST. PATIENT-LVL III: CPT | Mod: PBBFAC,,, | Performed by: INTERNAL MEDICINE

## 2025-04-09 PROCEDURE — 3078F DIAST BP <80 MM HG: CPT | Mod: CPTII,S$GLB,, | Performed by: INTERNAL MEDICINE

## 2025-04-09 PROCEDURE — 1126F AMNT PAIN NOTED NONE PRSNT: CPT | Mod: CPTII,S$GLB,, | Performed by: INTERNAL MEDICINE

## 2025-04-09 PROCEDURE — 3288F FALL RISK ASSESSMENT DOCD: CPT | Mod: CPTII,S$GLB,, | Performed by: INTERNAL MEDICINE

## 2025-04-09 NOTE — PROGRESS NOTES
Subjective     Rico Park Jr. is a 79 y.o. male with hypertension and hypercholesterolemia. He has a healthy weight. 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 He drinks a few drinks most nights. In 1/2025 he felt weak any dizzy when opening oysters. He had to sit down. In 2/2025 he had an episode of dizziness when taking people on a tour. He sat down. He may have passed out. He had another spell in late 2/2025. No exertional chest pain. No palpitations. He is referred for evaluation.       HPI    Review of Systems   Constitutional: Negative for chills, fever and malaise/fatigue.   HENT:  Negative for nosebleeds and tinnitus.    Eyes:  Negative for double vision, vision loss in left eye and vision loss in right eye.   Cardiovascular:  Positive for near-syncope and syncope. Negative for chest pain, claudication, dyspnea on exertion, irregular heartbeat, leg swelling, orthopnea, palpitations and paroxysmal nocturnal dyspnea.   Respiratory:  Negative for cough, hemoptysis, shortness of breath and wheezing.    Endocrine: Negative for cold intolerance and heat intolerance.   Hematologic/Lymphatic: Negative for bleeding problem. Does not bruise/bleed easily.   Skin:  Negative for color change and rash.   Musculoskeletal:  Negative for back pain, falls, muscle weakness and myalgias.   Gastrointestinal:  Negative for abdominal pain, diarrhea, dysphagia, heartburn, hematemesis, hematochezia, hemorrhoids, jaundice, melena, nausea and vomiting.   Genitourinary:  Negative for dysuria and hematuria.   Neurological:  Positive for dizziness and light-headedness. Negative for focal weakness, headaches, loss of balance, numbness, tremors, vertigo and weakness.   Psychiatric/Behavioral:  Negative for altered mental status, depression and memory loss. The patient is not nervous/anxious.    Allergic/Immunologic: Negative for hives and persistent infections.       Medications Ordered Prior to Encounter[1]     BP (!) 155/70  "  Pulse 60   Ht 5' 6" (1.676 m)   Wt 60.3 kg (132 lb 15 oz)   SpO2 96%   BMI 21.46 kg/m²        Objective     Physical Exam  Constitutional:       General: He is not in acute distress.     Appearance: Normal appearance. He is well-developed. He is not toxic-appearing or diaphoretic.   HENT:      Head: Normocephalic and atraumatic.      Nose: Nose normal.   Eyes:      General:         Right eye: No discharge.         Left eye: No discharge.      Conjunctiva/sclera:      Right eye: Right conjunctiva is not injected.      Left eye: Left conjunctiva is not injected.      Pupils: Pupils are equal.      Right eye: Pupil is round.      Left eye: Pupil is round.   Neck:      Thyroid: No thyromegaly.      Vascular: No carotid bruit or JVD.   Cardiovascular:      Rate and Rhythm: Normal rate and regular rhythm. No extrasystoles are present.     Chest Wall: PMI is not displaced.      Pulses:           Radial pulses are 2+ on the right side and 2+ on the left side.        Femoral pulses are 2+ on the right side and 2+ on the left side.       Dorsalis pedis pulses are 2+ on the right side and 2+ on the left side.        Posterior tibial pulses are 2+ on the right side and 2+ on the left side.      Heart sounds: S1 normal and S2 normal. Murmur heard.      Systolic murmur is present with a grade of 2/6 at the upper right sternal border.      No gallop.      Comments: Sittin/70 mmHg.  Standin/70 mmHg.  Pulmonary:      Effort: Pulmonary effort is normal.      Breath sounds: Normal breath sounds.   Abdominal:      Palpations: Abdomen is soft.      Tenderness: There is no abdominal tenderness.   Musculoskeletal:      Cervical back: Neck supple.      Right lower leg: Normal. No swelling. No edema.      Left lower leg: Normal. No swelling. No edema.   Lymphadenopathy:      Head:      Right side of head: No submandibular adenopathy.      Left side of head: No submandibular adenopathy.      Cervical: No cervical " adenopathy.   Skin:     General: Skin is warm and dry.      Findings: No rash.      Nails: There is no clubbing.   Neurological:      General: No focal deficit present.      Mental Status: He is alert and oriented to person, place, and time. He is not disoriented.      Cranial Nerves: No cranial nerve deficit.   Psychiatric:         Attention and Perception: Attention normal.         Mood and Affect: Mood and affect normal.         Speech: Speech normal.         Behavior: Behavior normal.         Thought Content: Thought content normal.         Cognition and Memory: Cognition and memory normal.         Judgment: Judgment normal.         Assessment and Plan     1. Dizziness    2. Syncope, unspecified syncope type    3. Primary hypertension    4. Anemia, unspecified type         Plan:    Syncope   2025: Dizziness and weakness when opening oysters. Unclear if LOC.   2025: Dizziness and possible LOC when having tour.   2025: Dizziness and possibly LOC when about having tour. EMS was called. Not taken to ER.   2/10/2025: Echo: Normal left ventricular size and systolic function. EF 65%. Mild LVH. Mild diastolic dysfunction. Mild aortic valve sclerosis.   3/7/2025: Holter: SR. Rare APCs and VPCs. One 10 beat run of SVT.   2025: ECG: SB 58. O/w normal ECG.   : Sittin/70 mmHg. Standin/70 mmHg.   Syncope as above. Mechanism unclear.   2025: Do Stress Echo. 30 Day Event Recorder. Good fluid intake. Slow in standing up. Reduce drinking.    2. Hypertension   2020: Diagnosed.   On amlodipine 5 mg Q24 and losartan 100 mg Q24.   Keep log at home.    3. Hypercholesterolemia   On atorvastatin 40 mg Q24.    4. Anemia   2/10/2025: H/H 10.6/31.7%.   Cause uncertain.    5. Primary Care   Dr. Osmar Stokes.    F/u 2 months.    Young Adhikari M.D.                          [1]   Current Outpatient Medications on File Prior to Visit   Medication Sig Dispense Refill    allopurinoL (ZYLOPRIM) 300 MG  tablet Take 1 tablet (300 mg total) by mouth once daily. 90 tablet 2    amLODIPine (NORVASC) 5 MG tablet TAKE 1 TABLET(5 MG) BY MOUTH DAILY 90 tablet 1    atorvastatin (LIPITOR) 40 MG tablet Take 1 tablet (40 mg total) by mouth once daily. 90 tablet 1    gabapentin (NEURONTIN) 300 MG capsule Take 1 capsule (300 mg total) by mouth 2 (two) times daily. 180 capsule 0    losartan (COZAAR) 100 MG tablet TAKE 1 TABLET(100 MG) BY MOUTH DAILY 90 tablet 1     No current facility-administered medications on file prior to visit.

## 2025-04-10 LAB
OHS QRS DURATION: 82 MS
OHS QTC CALCULATION: 435 MS

## 2025-04-15 ENCOUNTER — HOSPITAL ENCOUNTER (OUTPATIENT)
Dept: CARDIOLOGY | Facility: OTHER | Age: 80
Discharge: HOME | End: 2025-04-15
Attending: INTERNAL MEDICINE
Payer: MEDICARE

## 2025-04-15 VITALS
HEART RATE: 69 BPM | DIASTOLIC BLOOD PRESSURE: 71 MMHG | BODY MASS INDEX: 21.21 KG/M2 | WEIGHT: 132 LBS | SYSTOLIC BLOOD PRESSURE: 154 MMHG | HEIGHT: 66 IN

## 2025-04-15 DIAGNOSIS — R55 SYNCOPE, UNSPECIFIED SYNCOPE TYPE: ICD-10-CM

## 2025-04-15 LAB
BSA FOR ECHO PROCEDURE: 1.67 M2
CV ECHO LV RWT: 0.43 CM
CV STRESS BASE HR: 69 BPM
DIASTOLIC BLOOD PRESSURE: 71 MMHG
ECHO LV POSTERIOR WALL: 0.9 CM (ref 0.6–1.1)
FRACTIONAL SHORTENING: 28.6 % (ref 28–44)
INTERVENTRICULAR SEPTUM: 0.9 CM (ref 0.6–1.1)
LEFT INTERNAL DIMENSION IN SYSTOLE: 3 CM (ref 2.1–4)
LEFT VENTRICLE DIASTOLIC VOLUME INDEX: 47.02 ML/M2
LEFT VENTRICLE DIASTOLIC VOLUME: 79 ML
LEFT VENTRICLE MASS INDEX: 70.6 G/M2
LEFT VENTRICLE SYSTOLIC VOLUME INDEX: 20.2 ML/M2
LEFT VENTRICLE SYSTOLIC VOLUME: 34 ML
LEFT VENTRICULAR INTERNAL DIMENSION IN DIASTOLE: 4.2 CM (ref 3.5–6)
LEFT VENTRICULAR MASS: 118.7 G
LVED V (TEICH): 78.57 ML
LVES V (TEICH): 34.04 ML
OHS CV CPX 1 MINUTE RECOVERY HEART RATE: 110 BPM
OHS CV CPX 85 PERCENT MAX PREDICTED HEART RATE MALE: 121
OHS CV CPX ESTIMATED METS: 10
OHS CV CPX MAX PREDICTED HEART RATE: 142
OHS CV CPX PATIENT IS FEMALE: 0
OHS CV CPX PATIENT IS MALE: 1
OHS CV CPX PEAK DIASTOLIC BLOOD PRESSURE: 87 MMHG
OHS CV CPX PEAK HEAR RATE: 146 BPM
OHS CV CPX PEAK RATE PRESSURE PRODUCT: NORMAL
OHS CV CPX PEAK SYSTOLIC BLOOD PRESSURE: 188 MMHG
OHS CV CPX PERCENT MAX PREDICTED HEART RATE ACHIEVED: 103
OHS CV CPX RATE PRESSURE PRODUCT PRESENTING: NORMAL
STRESS ECHO POST EXERCISE DUR MIN: 8 MINUTES
STRESS ECHO POST EXERCISE DUR SEC: 31 SECONDS
SYSTOLIC BLOOD PRESSURE: 154 MMHG
Z-SCORE OF LEFT VENTRICULAR DIMENSION IN END DIASTOLE: -1.1
Z-SCORE OF LEFT VENTRICULAR DIMENSION IN END SYSTOLE: 0.26

## 2025-04-15 PROCEDURE — 93351 STRESS TTE COMPLETE: CPT

## 2025-04-21 ENCOUNTER — RESULTS FOLLOW-UP (OUTPATIENT)
Dept: CARDIOLOGY | Facility: OTHER | Age: 80
End: 2025-04-21

## 2025-05-01 ENCOUNTER — OFFICE VISIT (OUTPATIENT)
Dept: INTERNAL MEDICINE | Facility: CLINIC | Age: 80
End: 2025-05-01
Attending: INTERNAL MEDICINE
Payer: MEDICARE

## 2025-05-01 VITALS
SYSTOLIC BLOOD PRESSURE: 100 MMHG | HEIGHT: 66 IN | HEART RATE: 69 BPM | OXYGEN SATURATION: 96 % | WEIGHT: 134.5 LBS | BODY MASS INDEX: 21.62 KG/M2 | DIASTOLIC BLOOD PRESSURE: 50 MMHG

## 2025-05-01 DIAGNOSIS — D53.9 MACROCYTIC ANEMIA: ICD-10-CM

## 2025-05-01 DIAGNOSIS — R42 DIZZINESS: ICD-10-CM

## 2025-05-01 DIAGNOSIS — I10 PRIMARY HYPERTENSION: Primary | ICD-10-CM

## 2025-05-01 PROCEDURE — 1126F AMNT PAIN NOTED NONE PRSNT: CPT | Mod: CPTII,S$GLB,, | Performed by: INTERNAL MEDICINE

## 2025-05-01 PROCEDURE — G2211 COMPLEX E/M VISIT ADD ON: HCPCS | Mod: S$GLB,,, | Performed by: INTERNAL MEDICINE

## 2025-05-01 PROCEDURE — 99999 PR PBB SHADOW E&M-EST. PATIENT-LVL III: CPT | Mod: PBBFAC,,, | Performed by: INTERNAL MEDICINE

## 2025-05-01 PROCEDURE — 1159F MED LIST DOCD IN RCRD: CPT | Mod: CPTII,S$GLB,, | Performed by: INTERNAL MEDICINE

## 2025-05-01 PROCEDURE — 3078F DIAST BP <80 MM HG: CPT | Mod: CPTII,S$GLB,, | Performed by: INTERNAL MEDICINE

## 2025-05-01 PROCEDURE — 1160F RVW MEDS BY RX/DR IN RCRD: CPT | Mod: CPTII,S$GLB,, | Performed by: INTERNAL MEDICINE

## 2025-05-01 PROCEDURE — 3074F SYST BP LT 130 MM HG: CPT | Mod: CPTII,S$GLB,, | Performed by: INTERNAL MEDICINE

## 2025-05-01 PROCEDURE — 1101F PT FALLS ASSESS-DOCD LE1/YR: CPT | Mod: CPTII,S$GLB,, | Performed by: INTERNAL MEDICINE

## 2025-05-01 PROCEDURE — 3288F FALL RISK ASSESSMENT DOCD: CPT | Mod: CPTII,S$GLB,, | Performed by: INTERNAL MEDICINE

## 2025-05-01 PROCEDURE — 99214 OFFICE O/P EST MOD 30 MIN: CPT | Mod: S$GLB,,, | Performed by: INTERNAL MEDICINE

## 2025-05-01 NOTE — PROGRESS NOTES
"Subjective:       Patient ID: Rico Park Jr. is a 79 y.o. male.    Chief Complaint: Hypertension (6m)        No recurrence of syncope.  Denies chest pain, palpitations, weakness, fatigue.  He continues to jog several days a week.  Normal stress test recently via Cardiology.  Holter monitor 30 day scheduled    Hx of macrocytic anemia. Followed by Dr Sanabria. Last rec was for BM biopsy . Pt deferred due to being asymptomatic and is monitoring. He denies night sweats, unexplained weight loss, easy bruising/bleeding, recurrent infection, bone pains, malaise.        Pt is tolerating statin without frequent muscle cramps or diffuse myalgias or weakness.        History of Present Illness              Review of Systems   Constitutional:  Negative for appetite change, chills, fever and unexpected weight change.   HENT:  Negative for hearing loss, sore throat and trouble swallowing.    Eyes:  Negative for visual disturbance.   Respiratory:  Negative for cough, chest tightness and shortness of breath.    Cardiovascular:  Negative for chest pain and leg swelling.   Gastrointestinal:  Negative for abdominal pain, blood in stool, constipation, diarrhea, nausea and vomiting.   Endocrine: Negative for polydipsia and polyuria.   Genitourinary:  Negative for decreased urine volume, difficulty urinating, dysuria, frequency and urgency.   Musculoskeletal:  Negative for gait problem.   Skin:  Negative for rash.   Neurological:  Negative for dizziness and numbness.   Psychiatric/Behavioral:  The patient is not nervous/anxious.        Objective:      Vitals:    05/01/25 0801   BP: (!) 100/50   BP Location: Right arm   Patient Position: Sitting   Pulse: 69   SpO2: 96%   Weight: 61 kg (134 lb 7.7 oz)   Height: 5' 6" (1.676 m)      Physical Exam  Vitals and nursing note reviewed.   Constitutional:       General: He is not in acute distress.     Appearance: Normal appearance. He is well-developed.   HENT:      Head: Normocephalic and " atraumatic.      Mouth/Throat:      Pharynx: No oropharyngeal exudate.   Eyes:      General: No scleral icterus.     Conjunctiva/sclera: Conjunctivae normal.      Pupils: Pupils are equal, round, and reactive to light.   Neck:      Thyroid: No thyromegaly.   Cardiovascular:      Rate and Rhythm: Normal rate and regular rhythm.      Heart sounds: Normal heart sounds. No murmur heard.  Pulmonary:      Effort: Pulmonary effort is normal.      Breath sounds: Normal breath sounds. No wheezing or rales.   Abdominal:      General: There is no distension.   Musculoskeletal:         General: No tenderness.   Lymphadenopathy:      Cervical: No cervical adenopathy.   Skin:     General: Skin is warm and dry.   Neurological:      Mental Status: He is alert and oriented to person, place, and time.   Psychiatric:         Behavior: Behavior normal.         Assessment:       1. Primary hypertension    2. Dizziness    3. Macrocytic anemia        Plan:       Rico was seen today for hypertension.    Diagnoses and all orders for this visit:    Primary hypertension   Controlled and asymptomatic.  Continue current Rx regimen.    Dizziness   Monitor BP. F/u holter  -     COMPREHENSIVE METABOLIC PANEL; Future  -     CBC Auto Differential; Future    Macrocytic anemia  -     RETICULOCYTES; Future  -     IRON AND TIBC; Future  -     Ferritin; Future  -     LACTATE DEHYDROGENASE; Future  -     PROTEIN ELECTROPHORESIS, SERUM; Future         Visit today is associated with current or anticipated ongoing medical care related to this patient's single serious condition/complex condition of HTN, chronic macrocytic anemia. The patient will return to see me as these issues will be followed longitudinally.    Assessment & Plan              This note was generated with the assistance of ambient listening technology. Verbal consent was obtained by the patient and accompanying visitor(s) for the recording of patient appointment to facilitate this note.  I attest to having reviewed and edited the generated note for accuracy, though some syntax or spelling errors may persist. Please contact the author of this note for any clarification.      Osmar Pfeiffer MD  Internal Medicine-[facility]        Side effects of medication(s) were discussed in detail and patient voiced understanding.  Patient will call back for any issues or complications.

## 2025-05-02 ENCOUNTER — LAB VISIT (OUTPATIENT)
Dept: LAB | Facility: OTHER | Age: 80
End: 2025-05-02
Attending: INTERNAL MEDICINE
Payer: MEDICARE

## 2025-05-02 DIAGNOSIS — D53.9 MACROCYTIC ANEMIA: ICD-10-CM

## 2025-05-02 DIAGNOSIS — R42 DIZZINESS: ICD-10-CM

## 2025-05-02 LAB
ABSOLUTE EOSINOPHIL (OHS): 0.36 K/UL
ABSOLUTE MONOCYTE (OHS): 0.63 K/UL (ref 0.3–1)
ABSOLUTE NEUTROPHIL COUNT (OHS): 5.05 K/UL (ref 1.8–7.7)
ALBUMIN SERPL BCP-MCNC: 3.7 G/DL (ref 3.5–5.2)
ALP SERPL-CCNC: 77 UNIT/L (ref 40–150)
ALT SERPL W/O P-5'-P-CCNC: 9 UNIT/L (ref 10–44)
ANION GAP (OHS): 13 MMOL/L (ref 8–16)
AST SERPL-CCNC: 22 UNIT/L (ref 11–45)
BASOPHILS # BLD AUTO: 0.05 K/UL
BASOPHILS NFR BLD AUTO: 0.5 %
BILIRUB SERPL-MCNC: 0.5 MG/DL (ref 0.1–1)
BUN SERPL-MCNC: 20 MG/DL (ref 8–23)
CALCIUM SERPL-MCNC: 9 MG/DL (ref 8.7–10.5)
CHLORIDE SERPL-SCNC: 102 MMOL/L (ref 95–110)
CO2 SERPL-SCNC: 22 MMOL/L (ref 23–29)
CREAT SERPL-MCNC: 1.3 MG/DL (ref 0.5–1.4)
ERYTHROCYTE [DISTWIDTH] IN BLOOD BY AUTOMATED COUNT: 12 % (ref 11.5–14.5)
FERRITIN SERPL-MCNC: 923 NG/ML (ref 20–300)
GFR SERPLBLD CREATININE-BSD FMLA CKD-EPI: 56 ML/MIN/1.73/M2
GLUCOSE SERPL-MCNC: 132 MG/DL (ref 70–110)
HCT VFR BLD AUTO: 34.7 % (ref 40–54)
HGB BLD-MCNC: 11.3 GM/DL (ref 14–18)
IMM GRANULOCYTES # BLD AUTO: 0.03 K/UL (ref 0–0.04)
IMM GRANULOCYTES NFR BLD AUTO: 0.3 % (ref 0–0.5)
IRON SATN MFR SERPL: 56 % (ref 20–50)
IRON SERPL-MCNC: 188 UG/DL (ref 45–160)
LDH SERPL-CCNC: 165 U/L (ref 110–260)
LYMPHOCYTES # BLD AUTO: 4.11 K/UL (ref 1–4.8)
MCH RBC QN AUTO: 32.9 PG (ref 27–31)
MCHC RBC AUTO-ENTMCNC: 32.6 G/DL (ref 32–36)
MCV RBC AUTO: 101 FL (ref 82–98)
NUCLEATED RBC (/100WBC) (OHS): 0 /100 WBC
PLATELET # BLD AUTO: 295 K/UL (ref 150–450)
PMV BLD AUTO: 10.6 FL (ref 9.2–12.9)
POTASSIUM SERPL-SCNC: 3.6 MMOL/L (ref 3.5–5.1)
PROT SERPL-MCNC: 7.6 GM/DL (ref 6–8.4)
RBC # BLD AUTO: 3.43 M/UL (ref 4.6–6.2)
RELATIVE EOSINOPHIL (OHS): 3.5 %
RELATIVE LYMPHOCYTE (OHS): 40.2 % (ref 18–48)
RELATIVE MONOCYTE (OHS): 6.2 % (ref 4–15)
RELATIVE NEUTROPHIL (OHS): 49.3 % (ref 38–73)
RETICS/RBC NFR AUTO: 1.4 % (ref 0.4–2)
SODIUM SERPL-SCNC: 137 MMOL/L (ref 136–145)
TIBC SERPL-MCNC: 334 UG/DL (ref 250–450)
TRANSFERRIN SERPL-MCNC: 226 MG/DL (ref 200–375)
WBC # BLD AUTO: 10.23 K/UL (ref 3.9–12.7)

## 2025-05-02 PROCEDURE — 82040 ASSAY OF SERUM ALBUMIN: CPT

## 2025-05-02 PROCEDURE — 36415 COLL VENOUS BLD VENIPUNCTURE: CPT

## 2025-05-02 PROCEDURE — 84165 PROTEIN E-PHORESIS SERUM: CPT

## 2025-05-02 PROCEDURE — 83615 LACTATE (LD) (LDH) ENZYME: CPT

## 2025-05-02 PROCEDURE — 85045 AUTOMATED RETICULOCYTE COUNT: CPT

## 2025-05-02 PROCEDURE — 85025 COMPLETE CBC W/AUTO DIFF WBC: CPT

## 2025-05-02 PROCEDURE — 82728 ASSAY OF FERRITIN: CPT

## 2025-05-02 PROCEDURE — 83540 ASSAY OF IRON: CPT

## 2025-05-05 LAB
ALBUMIN, SPE (OHS): 4 G/DL (ref 3.35–5.55)
ALPHA 1 GLOB (OHS): 0.27 GM/DL (ref 0.17–0.41)
ALPHA 2 GLOB (OHS): 0.76 GM/DL (ref 0.43–0.99)
BETA GLOB (OHS): 0.83 GM/DL (ref 0.5–1.1)
GAMMA GLOBULIN (OHS): 1.13 GM/DL (ref 0.67–1.58)
PROT SERPL-MCNC: 7 GM/DL (ref 6–8.4)

## 2025-05-06 LAB — PATHOLOGIST REVIEW - SPE (OHS): NORMAL

## 2025-05-07 ENCOUNTER — RESULTS FOLLOW-UP (OUTPATIENT)
Dept: INTERNAL MEDICINE | Facility: CLINIC | Age: 80
End: 2025-05-07

## 2025-05-07 DIAGNOSIS — R94.4 DECREASED GFR: Primary | ICD-10-CM

## 2025-05-08 NOTE — PROGRESS NOTES
I have reviewed your recent lab work.  Your labs look good, except:    1) Mild decrease in kidney function again. Let's follow up on this in 3 moths with labs.    Anemia is stable.  Your liver studies are normal.  You do not have diabetes.  Your thyroid studies are normal.  Your cholesterol levels look good.    If you have any questions or concerns about particular lab results please notify me via [facility] messaging or by calling our office at [phone].    Respectfully,  Osmar Pfeiffer

## 2025-05-26 ENCOUNTER — CLINICAL SUPPORT (OUTPATIENT)
Dept: CARDIOLOGY | Facility: HOSPITAL | Age: 80
End: 2025-05-26
Attending: INTERNAL MEDICINE
Payer: MEDICARE

## 2025-05-26 DIAGNOSIS — R55 SYNCOPE, UNSPECIFIED SYNCOPE TYPE: ICD-10-CM

## 2025-05-26 PROCEDURE — 93270 REMOTE 30 DAY ECG REV/REPORT: CPT

## 2025-06-03 DIAGNOSIS — I10 BENIGN ESSENTIAL HTN: ICD-10-CM

## 2025-06-04 RX ORDER — AMLODIPINE BESYLATE 5 MG/1
5 TABLET ORAL DAILY
Qty: 90 TABLET | Refills: 2 | Status: SHIPPED | OUTPATIENT
Start: 2025-06-04

## 2025-07-11 ENCOUNTER — OFFICE VISIT (OUTPATIENT)
Dept: CARDIOLOGY | Facility: CLINIC | Age: 80
End: 2025-07-11
Attending: INTERNAL MEDICINE
Payer: MEDICARE

## 2025-07-11 VITALS
BODY MASS INDEX: 20.87 KG/M2 | WEIGHT: 129.88 LBS | HEIGHT: 66 IN | SYSTOLIC BLOOD PRESSURE: 113 MMHG | DIASTOLIC BLOOD PRESSURE: 57 MMHG | HEART RATE: 63 BPM

## 2025-07-11 DIAGNOSIS — R55 SYNCOPE, UNSPECIFIED SYNCOPE TYPE: ICD-10-CM

## 2025-07-11 DIAGNOSIS — E78.00 HYPERCHOLESTEROLEMIA: ICD-10-CM

## 2025-07-11 DIAGNOSIS — I10 PRIMARY HYPERTENSION: ICD-10-CM

## 2025-07-11 DIAGNOSIS — D64.9 ANEMIA, UNSPECIFIED TYPE: ICD-10-CM

## 2025-07-11 PROCEDURE — 99999 PR PBB SHADOW E&M-EST. PATIENT-LVL III: CPT | Mod: PBBFAC,,, | Performed by: INTERNAL MEDICINE

## 2025-07-11 NOTE — PROGRESS NOTES
"Subjective:     Rico Park Jr. is a 79 y.o. male with hypertension and hypercholesterolemia. He has a healthy weight. 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 In 1/2025 he felt weak any dizzy when opening oysters. He had to sit down. In 2/2025 he had an episode of dizziness when taking people on a tour. He sat down. He may have passed out. He had another spell in late 2/2025. On 4/15/2025 he underwent a stress echocardiogram.. He was able to do 8:30 minutes on the treadmill. He did not experience any chest pain and the electrocardiogram was negative. The echocardiogram was also "negative" with normal left ventricular size and systolic function att rest with an ejection fraction of 65%. On 7/7/2025 he had a 30 day event recorder. There were two patient triggered events when he felt dizzy and one auto triggered event over the 30 day monitoring time. The corresponding strips show normal sinus rhythm at between 73-86 bpm. On one strip there was a six beat run of supraventricular tachycardia at 139 bpm. He has cut back on his drinking. No exertional chest pain. No palpitations. Feeling well overall.       Dizziness:    Associated symptoms: light-headedness and syncope.no fever, no headaches, no tinnitus, no nausea, no vomiting, no weakness, no palpitations and no chest pain.  Loss of Consciousness  Associated symptoms include dizziness and light-headedness. Pertinent negatives include no abdominal pain, back pain, chest pain, fever, focal weakness, headaches, malaise/fatigue, nausea, palpitations, vertigo, vomiting or weakness.   Hypertension  Pertinent negatives include no chest pain, headaches, malaise/fatigue, orthopnea, palpitations, PND or shortness of breath.   Hyperlipidemia  Pertinent negatives include no chest pain, focal weakness, myalgias or shortness of breath.     Review of Systems   Constitutional: Negative for chills, fever and malaise/fatigue.   HENT:  " Negative for nosebleeds and tinnitus.    Eyes:  Negative for double vision, vision loss in left eye and vision loss in right eye.   Cardiovascular:  Positive for near-syncope and syncope. Negative for chest pain, claudication, dyspnea on exertion, irregular heartbeat, leg swelling, orthopnea, palpitations and paroxysmal nocturnal dyspnea.   Respiratory:  Negative for cough, hemoptysis, shortness of breath and wheezing.    Endocrine: Negative for cold intolerance and heat intolerance.   Hematologic/Lymphatic: Negative for bleeding problem. Does not bruise/bleed easily.   Skin:  Negative for color change and rash.   Musculoskeletal:  Negative for back pain, falls, muscle weakness and myalgias.   Gastrointestinal:  Negative for abdominal pain, diarrhea, dysphagia, heartburn, hematemesis, hematochezia, hemorrhoids, jaundice, melena, nausea and vomiting.   Genitourinary:  Negative for dysuria and hematuria.   Neurological:  Positive for dizziness and light-headedness. Negative for focal weakness, headaches, loss of balance, numbness, tremors, vertigo and weakness.   Psychiatric/Behavioral:  Negative for altered mental status, depression and memory loss. The patient is not nervous/anxious.    Allergic/Immunologic: Negative for hives and persistent infections.     Current Outpatient Medications on File Prior to Visit   Medication Sig Dispense Refill    allopurinoL (ZYLOPRIM) 300 MG tablet Take 1 tablet (300 mg total) by mouth once daily. 90 tablet 2    amLODIPine (NORVASC) 5 MG tablet Take 1 tablet (5 mg total) by mouth once daily. 90 tablet 2    atorvastatin (LIPITOR) 40 MG tablet Take 1 tablet (40 mg total) by mouth once daily. 90 tablet 1    gabapentin (NEURONTIN) 300 MG capsule Take 1 capsule (300 mg total) by mouth 2 (two) times daily. 180 capsule 0    losartan (COZAAR) 100 MG tablet TAKE 1 TABLET(100 MG) BY MOUTH DAILY 90 tablet 1     No current facility-administered medications on file prior to visit.     "  Objective:     BP (!) 113/57 (BP Location: Right arm, Patient Position: Sitting)   Pulse 63   Ht 5' 6" (1.676 m)   Wt 58.9 kg (129 lb 13.6 oz)   BMI 20.96 kg/m²      Physical Exam  Constitutional:       General: He is not in acute distress.     Appearance: Normal appearance. He is well-developed. He is not toxic-appearing or diaphoretic.   HENT:      Head: Normocephalic and atraumatic.      Nose: Nose normal.   Eyes:      General:         Right eye: No discharge.         Left eye: No discharge.      Conjunctiva/sclera:      Right eye: Right conjunctiva is not injected.      Left eye: Left conjunctiva is not injected.      Pupils: Pupils are equal.      Right eye: Pupil is round.      Left eye: Pupil is round.   Neck:      Thyroid: No thyromegaly.      Vascular: No carotid bruit or JVD.   Cardiovascular:      Rate and Rhythm: Normal rate and regular rhythm. No extrasystoles are present.     Chest Wall: PMI is not displaced.      Pulses:           Radial pulses are 2+ on the right side and 2+ on the left side.        Femoral pulses are 2+ on the right side and 2+ on the left side.       Dorsalis pedis pulses are 2+ on the right side and 2+ on the left side.        Posterior tibial pulses are 2+ on the right side and 2+ on the left side.      Heart sounds: S1 normal and S2 normal. Murmur heard.      Systolic murmur is present with a grade of 2/6 at the upper right sternal border.      No gallop.      Comments: Sittin/70 mmHg.  Standin/70 mmHg.  Pulmonary:      Effort: Pulmonary effort is normal.      Breath sounds: Normal breath sounds.   Abdominal:      Palpations: Abdomen is soft.      Tenderness: There is no abdominal tenderness.   Musculoskeletal:      Cervical back: Neck supple.      Right lower leg: Normal. No swelling. No edema.      Left lower leg: Normal. No swelling. No edema.   Lymphadenopathy:      Head:      Right side of head: No submandibular adenopathy.      Left side of head: No " "submandibular adenopathy.      Cervical: No cervical adenopathy.   Skin:     General: Skin is warm and dry.      Findings: No rash.      Nails: There is no clubbing.   Neurological:      General: No focal deficit present.      Mental Status: He is alert and oriented to person, place, and time. He is not disoriented.      Cranial Nerves: No cranial nerve deficit.   Psychiatric:         Attention and Perception: Attention normal.         Mood and Affect: Mood and affect normal.         Speech: Speech normal.         Behavior: Behavior normal.         Thought Content: Thought content normal.         Cognition and Memory: Cognition and memory normal.         Judgment: Judgment normal.       Assessment:      1. Syncope, unspecified syncope type    2. Primary hypertension    3. Hypercholesterolemia    4. Anemia, unspecified type      Plan:     Syncope   2025: Dizziness and weakness when opening oysters. Unclear if LOC.   2025: Dizziness and possible LOC when having tour.   2025: Dizziness and possibly LOC when about having tour. EMS was called. Not taken to ER.   2/10/2025: Echo: Normal left ventricular size and systolic function. EF 65%. Mild LVH. Mild diastolic dysfunction. Mild aortic valve sclerosis.   3/7/2025: Holter: SR. Rare APCs and VPCs. One 10 beat run of SVT.   2025: ECG: SB 58. O/w normal ECG.   : Sittin/70 mmHg. Standin/70 mmHg.   4/15/2025: Stress Echo: 8:30 min. No CP. ECG negative. Echo "negative". Normal left ventricular size and systolic function. EF 65%.  2025: 30 Day Event Recorder: There were 2 patient triggered events (no sx specified) and 1 auto triggered event over the 30 day monitoring time. Corresponding strips show NSR 73-86 bpm. On one strip, a 6b run of NSVT at 139 bpm was seen.   Syncope as above. Mechanism unclear.   Good fluid intake. Slow if standing up. Reduce drinking.   No recurrence.    2. Hypertension   2020: Diagnosed.   On amlodipine 5 mg Q24 and " losartan 100 mg Q24.   Keep log at home.    3. Hypercholesterolemia   On atorvastatin 40 mg Q24.    4. Anemia   2/10/2025: H/H 10.6/31.7%.   Cause uncertain.    5. Primary Care   Dr. Osmar Stokes.    F/u 6 months.    Young Adhikari M.D.